# Patient Record
Sex: MALE | Race: WHITE | NOT HISPANIC OR LATINO | Employment: FULL TIME | URBAN - METROPOLITAN AREA
[De-identification: names, ages, dates, MRNs, and addresses within clinical notes are randomized per-mention and may not be internally consistent; named-entity substitution may affect disease eponyms.]

---

## 2017-01-05 ENCOUNTER — OFFICE VISIT (OUTPATIENT)
Dept: NEUROLOGY | Facility: CLINIC | Age: 57
End: 2017-01-05

## 2017-01-05 VITALS
OXYGEN SATURATION: 95 % | WEIGHT: 210 LBS | SYSTOLIC BLOOD PRESSURE: 130 MMHG | HEART RATE: 83 BPM | HEIGHT: 68 IN | DIASTOLIC BLOOD PRESSURE: 88 MMHG | BODY MASS INDEX: 31.83 KG/M2

## 2017-01-05 DIAGNOSIS — R41.3 MEMORY LOSS: Primary | ICD-10-CM

## 2017-01-05 DIAGNOSIS — I73.9 MICROANGIOPATHY (HCC): ICD-10-CM

## 2017-01-05 DIAGNOSIS — F41.9 ANXIETY: ICD-10-CM

## 2017-01-05 PROCEDURE — 99213 OFFICE O/P EST LOW 20 MIN: CPT | Performed by: PSYCHIATRY & NEUROLOGY

## 2017-01-05 RX ORDER — ATORVASTATIN CALCIUM 20 MG/1
20 TABLET, FILM COATED ORAL DAILY
Qty: 90 TABLET | Refills: 3 | Status: SHIPPED | OUTPATIENT
Start: 2017-01-05 | End: 2017-01-19 | Stop reason: SDUPTHER

## 2017-01-05 RX ORDER — BUPROPION HYDROCHLORIDE 150 MG/1
150 TABLET ORAL DAILY
Qty: 30 TABLET | Refills: 1 | Status: SHIPPED | OUTPATIENT
Start: 2017-01-05 | End: 2017-02-14 | Stop reason: DRUGHIGH

## 2017-01-05 RX ORDER — BUPROPION HYDROCHLORIDE 300 MG/1
300 TABLET ORAL EVERY MORNING
Qty: 90 TABLET | Refills: 3 | Status: SHIPPED | OUTPATIENT
Start: 2017-01-05 | End: 2017-01-19 | Stop reason: SDUPTHER

## 2017-01-05 NOTE — PROGRESS NOTES
Subjective:     Patient ID: Koffi Melo is a 56 y.o. male.    History of Present Illness  The following portions of the patient's history were reviewed and updated as appropriate: allergies, current medications, past family history, past medical history, past social history, past surgical history and problem list.  Patient has noticed that much of a change in memory and concentration with current Wellbutrin dose, denies further stroke symptoms. He underwent cranial MRI that showed moderate chronic microvascular changes.  Review of Systems   Constitutional: Negative for chills, fatigue, fever and unexpected weight change.   HENT: Negative for ear pain, hearing loss, nosebleeds, rhinorrhea and sore throat.    Eyes: Negative for photophobia, pain, discharge, itching and visual disturbance.   Respiratory: Negative for cough, chest tightness, shortness of breath and wheezing.    Cardiovascular: Negative for chest pain, palpitations and leg swelling.   Gastrointestinal: Negative for abdominal pain, blood in stool, constipation, diarrhea, nausea and vomiting.   Genitourinary: Negative for dysuria, frequency, hematuria and urgency.   Musculoskeletal: Negative for arthralgias, back pain, gait problem, joint swelling, myalgias, neck pain and neck stiffness.   Skin: Negative for rash and wound.   Allergic/Immunologic: Negative for environmental allergies and food allergies.   Neurological: Positive for headaches. Negative for dizziness, tremors, seizures, syncope, speech difficulty, weakness, light-headedness and numbness.   Hematological: Negative for adenopathy. Does not bruise/bleed easily.   Psychiatric/Behavioral: Negative for agitation, confusion, decreased concentration, hallucinations, sleep disturbance and suicidal ideas. The patient is not nervous/anxious.         Objective:    Neurologic Exam     Mental Status   Oriented to person, place, and time.       Physical Exam   Constitutional: He is oriented to  person, place, and time. He appears well-developed and well-nourished.   Cardiovascular: Normal rate and regular rhythm.    Pulmonary/Chest: Effort normal.   Neurological: He is alert and oriented to person, place, and time. He has normal reflexes.   Psychiatric: He has a normal mood and affect. His behavior is normal. Thought content normal.       Assessment/Plan:     Koffi was seen today for memory loss and sleeping problem.    Diagnoses and all orders for this visit:    Memory loss  -     buPROPion XL (WELLBUTRIN XL) 150 MG 24 hr tablet; Take 1 tablet by mouth Daily.  -     buPROPion XL (WELLBUTRIN XL) 300 MG 24 hr tablet; Take 1 tablet by mouth Every Morning.    Anxiety  -     buPROPion XL (WELLBUTRIN XL) 150 MG 24 hr tablet; Take 1 tablet by mouth Daily.  -     buPROPion XL (WELLBUTRIN XL) 300 MG 24 hr tablet; Take 1 tablet by mouth Every Morning.    Microangiopathy  -     atorvastatin (LIPITOR) 20 MG tablet; Take 1 tablet by mouth Daily.  -     Comprehensive Metabolic Panel; Future

## 2017-01-05 NOTE — MR AVS SNAPSHOT
"                        Koffi Melo   1/5/2017 4:00 PM   Office Visit    Dept Phone:  [phone]   Encounter #:  [id number]    Provider:  Abdon Wallace MD   Department:  [facility] NEUROLOGY                Your Full Care Plan              Your Updated Medication List          This list is accurate as of: 1/5/17  4:45 PM.  Always use your most recent med list.                aspirin 81 MG EC tablet       buPROPion  MG 24 hr tablet   Commonly known as:  WELLBUTRIN XL       cyclobenzaprine 10 MG tablet   Commonly known as:  FLEXERIL   Take 1 tablet by mouth 3 (Three) Times a Day As Needed for muscle spasms.       levoFLOXacin 500 MG tablet   Commonly known as:  LEVAQUIN   Take 1 tablet by mouth Daily.               Instructions     None    Patient Instructions History      Upcoming Appointments     Visit Type Date Time Department    FOLLOW UP 1/5/2017  4:00 PM [facility]    FOLLOW UP 7/6/2017  4:00 PM [facility]      MyChart Signup     Our records indicate that you have declined [facility] Vayus[facility]art signup. If you would like to sign up for Vayus[facility]art, please email [facility] at [email or web address] or call [phone] to obtain an activation code.             Other Info from Your Visit           Your Appointments     Jul 06, 2017  4:00 PM EDT   Follow Up with Abdon Wallace MD   [facility] NEUROLOGY (--)    [address] [place] [id number] [phone]           Arrive 15 minutes prior to appointment.              Allergies     Lortab [Hydrocodone-acetaminophen] Allergy Itching      Reason for Visit     Memory Loss     Sleeping Problem           Vital Signs     Blood Pressure Pulse Height Weight Oxygen Saturation Body Mass Index    130/88 83 68\" (172.7 cm) 210 lb (95.3 kg) 95% 31.93 kg/m2    Smoking Status                   Never Smoker             "

## 2017-01-19 DIAGNOSIS — I73.9 MICROANGIOPATHY (HCC): ICD-10-CM

## 2017-01-19 DIAGNOSIS — R41.3 MEMORY LOSS: ICD-10-CM

## 2017-01-19 DIAGNOSIS — F41.9 ANXIETY: ICD-10-CM

## 2017-01-19 RX ORDER — ATORVASTATIN CALCIUM 20 MG/1
20 TABLET, FILM COATED ORAL DAILY
Qty: 90 TABLET | Refills: 3 | Status: SHIPPED | OUTPATIENT
Start: 2017-01-19 | End: 2017-12-22 | Stop reason: SDUPTHER

## 2017-01-19 RX ORDER — BUPROPION HYDROCHLORIDE 300 MG/1
300 TABLET ORAL EVERY MORNING
Qty: 90 TABLET | Refills: 3 | Status: SHIPPED | OUTPATIENT
Start: 2017-01-19 | End: 2018-02-26 | Stop reason: SDUPTHER

## 2017-02-14 ENCOUNTER — OFFICE VISIT (OUTPATIENT)
Dept: INTERNAL MEDICINE | Facility: CLINIC | Age: 57
End: 2017-02-14

## 2017-02-14 VITALS
DIASTOLIC BLOOD PRESSURE: 78 MMHG | RESPIRATION RATE: 16 BRPM | WEIGHT: 218 LBS | TEMPERATURE: 97.6 F | SYSTOLIC BLOOD PRESSURE: 126 MMHG | BODY MASS INDEX: 33.15 KG/M2 | HEART RATE: 72 BPM

## 2017-02-14 DIAGNOSIS — M54.2 NECK PAIN: Primary | ICD-10-CM

## 2017-02-14 DIAGNOSIS — M54.9 UPPER BACK PAIN: ICD-10-CM

## 2017-02-14 PROCEDURE — 99213 OFFICE O/P EST LOW 20 MIN: CPT | Performed by: PHYSICIAN ASSISTANT

## 2017-02-14 RX ORDER — MELOXICAM 15 MG/1
15 TABLET ORAL DAILY
Qty: 30 TABLET | Refills: 2 | Status: SHIPPED | OUTPATIENT
Start: 2017-02-14 | End: 2017-02-15 | Stop reason: SDUPTHER

## 2017-02-14 RX ORDER — CYCLOBENZAPRINE HCL 10 MG
10 TABLET ORAL 3 TIMES DAILY PRN
Qty: 30 TABLET | Refills: 2 | Status: SHIPPED | OUTPATIENT
Start: 2017-02-14 | End: 2017-02-15 | Stop reason: SDUPTHER
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
## 2017-02-15 ENCOUNTER — TELEPHONE (OUTPATIENT)
Dept: INTERNAL MEDICINE | Facility: CLINIC | Age: 57
End: 2017-02-15

## 2017-02-15 ENCOUNTER — HOSPITAL ENCOUNTER (OUTPATIENT)
Dept: GENERAL RADIOLOGY | Facility: HOSPITAL | Age: 57
Discharge: HOME | End: 2017-02-15
Admitting: PHYSICIAN ASSISTANT

## 2017-02-15 DIAGNOSIS — M54.9 UPPER BACK PAIN: ICD-10-CM

## 2017-02-15 DIAGNOSIS — M54.2 NECK PAIN: ICD-10-CM

## 2017-02-15 PROCEDURE — 72040 X-RAY EXAM NECK SPINE 2-3 VW: CPT

## 2017-02-15 PROCEDURE — 72070 X-RAY EXAM THORAC SPINE 2VWS: CPT

## 2017-02-15 RX ORDER — CYCLOBENZAPRINE HCL 10 MG
10 TABLET ORAL 3 TIMES DAILY PRN
Qty: 30 TABLET | Refills: 0 | Status: SHIPPED | OUTPATIENT
Start: 2017-02-15 | End: 2018-06-05

## 2017-02-15 RX ORDER — MELOXICAM 15 MG/1
15 TABLET ORAL DAILY
Qty: 10 TABLET | Refills: 0 | Status: SHIPPED | OUTPATIENT
Start: 2017-02-15 | End: 2017-03-09 | Stop reason: SDUPTHER

## 2017-02-15 NOTE — TELEPHONE ENCOUNTER
10 DAY RXS SENT TO [facility] AT [facility] VIA [employer].     PT NOTIFIED.  DARREN MALDONADO APPREC.

## 2017-02-24 ENCOUNTER — OFFICE VISIT (OUTPATIENT)
Dept: INTERNAL MEDICINE | Facility: CLINIC | Age: 57
End: 2017-02-24

## 2017-02-24 VITALS
TEMPERATURE: 97.5 F | OXYGEN SATURATION: 97 % | BODY MASS INDEX: 32.55 KG/M2 | HEART RATE: 72 BPM | HEIGHT: 68 IN | DIASTOLIC BLOOD PRESSURE: 78 MMHG | SYSTOLIC BLOOD PRESSURE: 126 MMHG | RESPIRATION RATE: 18 BRPM | WEIGHT: 214.8 LBS

## 2017-02-24 DIAGNOSIS — F41.9 ANXIETY: Primary | ICD-10-CM

## 2017-02-24 PROCEDURE — 99213 OFFICE O/P EST LOW 20 MIN: CPT | Performed by: PHYSICIAN ASSISTANT

## 2017-02-24 RX ORDER — FLUOXETINE 20 MG/1
20 TABLET, FILM COATED ORAL DAILY
Qty: 30 TABLET | Refills: 2 | Status: SHIPPED | OUTPATIENT
Start: 2017-02-24 | End: 2017-02-24 | Stop reason: SDUPTHER

## 2017-02-24 RX ORDER — FLUOXETINE 20 MG/1
20 TABLET, FILM COATED ORAL DAILY
Qty: 30 TABLET | Refills: 2 | Status: SHIPPED | OUTPATIENT
Start: 2017-02-24 | End: 2017-06-04 | Stop reason: SDUPTHER

## 2017-02-24 NOTE — PROGRESS NOTES
"Subjective   Koffi Melo is a 56 y.o. male.   Chief Complaint   Patient presents with   • Memory Loss     States he is trouble with his memory. States he saw  and states he had an MRI that showed some collapsing arteries. Patient states he get's \"tore up\" easily or feels overwhelmed. States he is currently taking wellbutrin but doesn't feel that its working.      History of Present Illness     Pt here for follow up memory loss and questionable anxiety.  He was previously on klonopin and celexa a couple of years ago but discontinued them as he felt that he no longer needed them.  He has been working at [employer] for 25 years and finds his job to be very stressful.  He has anxiety about going to work and is always afraid that he is going to do something wrong and disappoint his boss.  Has difficulty finding words at times and his boss says that he finds that Yovani has trouble relaying things to him.  He says he feels that things have to be perfect and done in a certain way.  His son has OCD.    Has seen Dr. Hernandez (neuro) for these symptoms in the past.  Had CVA in 2015 - admitted to [facility], found to have PFO and treated with a patch.  MRI was ordered by Dr. Hernandez in Nov 2016.  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  He has also had normal MRA brain.      The following portions of the patient's history were reviewed and updated as appropriate: allergies, current medications, past family history, past medical history, past social history, past surgical history and problem list.    Review of Systems   Constitutional: Negative.    HENT: Negative.    Respiratory: Negative.    Cardiovascular: Negative.    Gastrointestinal: Negative.    Neurological: "        Forgetfulness   Psychiatric/Behavioral:        Anxiety       Objective   Physical Exam   Constitutional: He appears well-developed and well-nourished.   Cardiovascular: Normal rate, regular rhythm and normal heart sounds.    Pulmonary/Chest: Effort normal and breath sounds normal.   Psychiatric: He has a normal mood and affect. His behavior is normal. Judgment and thought content normal.       Assessment/Plan   Koffi was seen today for memory loss.    Diagnoses and all orders for this visit:    Anxiety  -     FLUoxetine (PROzac) 20 MG tablet; Take 1 tablet by mouth Daily for 90 days.      I feel that a lot of his symptoms stem from job related stressors.   He is currently looking for a new place of employment which I think will be positive.   Will try prozac and follow up in 1 month.

## 2017-03-09 ENCOUNTER — OFFICE VISIT (OUTPATIENT)
Dept: INTERNAL MEDICINE | Facility: CLINIC | Age: 57
End: 2017-03-09

## 2017-03-09 VITALS
SYSTOLIC BLOOD PRESSURE: 146 MMHG | TEMPERATURE: 97.4 F | HEART RATE: 90 BPM | DIASTOLIC BLOOD PRESSURE: 88 MMHG | RESPIRATION RATE: 20 BRPM

## 2017-03-09 DIAGNOSIS — M54.2 NECK PAIN: ICD-10-CM

## 2017-03-09 DIAGNOSIS — R10.9 LEFT FLANK PAIN: ICD-10-CM

## 2017-03-09 DIAGNOSIS — M54.9 UPPER BACK PAIN: ICD-10-CM

## 2017-03-09 DIAGNOSIS — M54.31 SCIATICA OF RIGHT SIDE: ICD-10-CM

## 2017-03-09 DIAGNOSIS — N20.0 KIDNEY STONES: Primary | ICD-10-CM

## 2017-03-09 LAB
ALBUMIN SERPL-MCNC: 4.6 G/DL (ref 3.2–4.8)
ALBUMIN/GLOB SERPL: 1.8 G/DL (ref 1.5–2.5)
ALP SERPL-CCNC: 74 U/L (ref 25–100)
ALT SERPL W P-5'-P-CCNC: 26 U/L (ref 7–40)
ANION GAP SERPL CALCULATED.3IONS-SCNC: 5 MMOL/L (ref 3–11)
AST SERPL-CCNC: 31 U/L (ref 0–33)
BASOPHILS # BLD AUTO: 0.04 10*3/MM3 (ref 0–0.2)
BASOPHILS NFR BLD AUTO: 0.5 % (ref 0–1)
BILIRUB BLD-MCNC: NEGATIVE MG/DL
BILIRUB SERPL-MCNC: 0.5 MG/DL (ref 0.3–1.2)
BUN BLD-MCNC: 16 MG/DL (ref 9–23)
BUN/CREAT SERPL: 13.3 (ref 7–25)
CALCIUM SPEC-SCNC: 9.9 MG/DL (ref 8.7–10.4)
CHLORIDE SERPL-SCNC: 105 MMOL/L (ref 99–109)
CLARITY, POC: CLEAR
CO2 SERPL-SCNC: 27 MMOL/L (ref 20–31)
COLOR UR: YELLOW
CREAT BLD-MCNC: 1.2 MG/DL (ref 0.6–1.3)
DEPRECATED RDW RBC AUTO: 41.4 FL (ref 37–54)
EOSINOPHIL # BLD AUTO: 0.11 10*3/MM3 (ref 0.1–0.3)
EOSINOPHIL NFR BLD AUTO: 1.3 % (ref 0–3)
ERYTHROCYTE [DISTWIDTH] IN BLOOD BY AUTOMATED COUNT: 13.1 % (ref 11.3–14.5)
EXPIRATION DATE: NORMAL
GFR SERPL CREATININE-BSD FRML MDRD: 63 ML/MIN/1.73
GLOBULIN UR ELPH-MCNC: 2.6 GM/DL
GLUCOSE BLD-MCNC: 86 MG/DL (ref 70–100)
GLUCOSE UR STRIP-MCNC: NEGATIVE MG/DL
HCT VFR BLD AUTO: 43.9 % (ref 38.9–50.9)
HGB BLD-MCNC: 15.2 G/DL (ref 13.1–17.5)
IMM GRANULOCYTES # BLD: 0.02 10*3/MM3 (ref 0–0.03)
IMM GRANULOCYTES NFR BLD: 0.2 % (ref 0–0.6)
KETONES UR QL: NEGATIVE
LEUKOCYTE EST, POC: NEGATIVE
LYMPHOCYTES # BLD AUTO: 2.11 10*3/MM3 (ref 0.6–4.8)
LYMPHOCYTES NFR BLD AUTO: 24.9 % (ref 24–44)
Lab: NORMAL
MCH RBC QN AUTO: 29.8 PG (ref 27–31)
MCHC RBC AUTO-ENTMCNC: 34.6 G/DL (ref 32–36)
MCV RBC AUTO: 86.1 FL (ref 80–99)
MONOCYTES # BLD AUTO: 0.7 10*3/MM3 (ref 0–1)
MONOCYTES NFR BLD AUTO: 8.3 % (ref 0–12)
NEUTROPHILS # BLD AUTO: 5.48 10*3/MM3 (ref 1.5–8.3)
NEUTROPHILS NFR BLD AUTO: 64.8 % (ref 41–71)
NITRITE UR-MCNC: NEGATIVE MG/ML
PH UR: 6 [PH] (ref 5–8)
PLATELET # BLD AUTO: 198 10*3/MM3 (ref 150–450)
PMV BLD AUTO: 10.1 FL (ref 6–12)
POTASSIUM BLD-SCNC: 3.9 MMOL/L (ref 3.5–5.5)
PROT SERPL-MCNC: 7.2 G/DL (ref 5.7–8.2)
PROT UR STRIP-MCNC: NEGATIVE MG/DL
RBC # BLD AUTO: 5.1 10*6/MM3 (ref 4.2–5.76)
RBC # UR STRIP: NEGATIVE /UL
SODIUM BLD-SCNC: 137 MMOL/L (ref 132–146)
SP GR UR: 1.02 (ref 1–1.03)
UROBILINOGEN UR QL: NORMAL
WBC NRBC COR # BLD: 8.46 10*3/MM3 (ref 3.5–10.8)

## 2017-03-09 PROCEDURE — 80053 COMPREHEN METABOLIC PANEL: CPT | Performed by: NURSE PRACTITIONER

## 2017-03-09 PROCEDURE — 85025 COMPLETE CBC W/AUTO DIFF WBC: CPT | Performed by: NURSE PRACTITIONER

## 2017-03-09 PROCEDURE — 81003 URINALYSIS AUTO W/O SCOPE: CPT | Performed by: NURSE PRACTITIONER

## 2017-03-09 PROCEDURE — 99214 OFFICE O/P EST MOD 30 MIN: CPT | Performed by: NURSE PRACTITIONER

## 2017-03-09 PROCEDURE — 96372 THER/PROPH/DIAG INJ SC/IM: CPT | Performed by: NURSE PRACTITIONER

## 2017-03-09 RX ORDER — MELOXICAM 15 MG/1
15 TABLET ORAL DAILY
Qty: 10 TABLET | Refills: 0 | Status: SHIPPED | OUTPATIENT
Start: 2017-03-09 | End: 2017-09-06

## 2017-03-09 RX ORDER — DEXAMETHASONE SODIUM PHOSPHATE 4 MG/ML
8 INJECTION, SOLUTION INTRA-ARTICULAR; INTRALESIONAL; INTRAMUSCULAR; INTRAVENOUS; SOFT TISSUE ONCE
Status: COMPLETED | OUTPATIENT
Start: 2017-03-09 | End: 2017-03-09

## 2017-03-09 RX ORDER — DEXAMETHASONE SODIUM PHOSPHATE 4 MG/ML
8 INJECTION, SOLUTION INTRA-ARTICULAR; INTRALESIONAL; INTRAMUSCULAR; INTRAVENOUS; SOFT TISSUE ONCE
Status: DISCONTINUED | OUTPATIENT
Start: 2017-03-09 | End: 2017-03-09

## 2017-03-09 RX ORDER — METHYLPREDNISOLONE 4 MG/1
TABLET ORAL
Qty: 21 TABLET | Refills: 0 | Status: SHIPPED | OUTPATIENT
Start: 2017-03-09 | End: 2017-03-28

## 2017-03-09 RX ORDER — KETOROLAC TROMETHAMINE 30 MG/ML
60 INJECTION, SOLUTION INTRAMUSCULAR; INTRAVENOUS ONCE AS NEEDED
Qty: 2 ML | Refills: 0 | Status: SHIPPED | OUTPATIENT
Start: 2017-03-09 | End: 2017-03-10

## 2017-03-09 RX ORDER — METHYLPREDNISOLONE 4 MG/1
TABLET ORAL
Qty: 21 TABLET | Refills: 0 | Status: SHIPPED | OUTPATIENT
Start: 2017-03-09 | End: 2017-03-09

## 2017-03-09 RX ORDER — DEXAMETHASONE SODIUM PHOSPHATE 4 MG/ML
4 INJECTION, SOLUTION INTRA-ARTICULAR; INTRALESIONAL; INTRAMUSCULAR; INTRAVENOUS; SOFT TISSUE ONCE
Status: CANCELLED | OUTPATIENT
Start: 2017-03-09 | End: 2017-03-09

## 2017-03-09 RX ADMIN — DEXAMETHASONE SODIUM PHOSPHATE 8 MG: 4 INJECTION, SOLUTION INTRA-ARTICULAR; INTRALESIONAL; INTRAMUSCULAR; INTRAVENOUS; SOFT TISSUE at 17:45

## 2017-03-09 NOTE — PROGRESS NOTES
Chief Complaint   Patient presents with   • Back Pain   • Hip Pain        Subjective     History of Present Illness   l flank pain started thought is was kidney stone pain x 3d felt like his previous sotnese.  Pain is a 7-8 on 1-10 scale no colick fever. If he leans then pain in L flank.    Still having pain in R trapezius and saw clarisa gilbert and had xray and was neg and was instructed to take flexeril and feels like intemittent sharp pain.   He has not taken any otc med for pain but does not think it is going to help. No numbness tingling weakness in BUE.  Only took the flexeril 1-2 times for this symptom.    Today her developed pain in R hip and sharp and feels like he is pulling something and radiates down hip and how he steps aggravates.  No recent trauma long travel sitting .  No changes in bowel bladder control no weakness numbness in BLE      The following portions of the patient's history were reviewed and updated as appropriate: allergies, current medications, past family history, past medical history, past social history, past surgical history and problem list.    Review of Systems   Musculoskeletal: Positive for back pain.   All other systems reviewed and are negative.      Objective   Physical Exam   Constitutional: He is oriented to person, place, and time. He appears well-developed and well-nourished.   Neck: Normal range of motion.   Cardiovascular: Normal rate and regular rhythm.    Pulmonary/Chest: Effort normal and breath sounds normal.   Abdominal: Soft. Bowel sounds are normal. He exhibits no distension and no mass. There is no tenderness. There is no rebound and no guarding. No hernia.   Musculoskeletal: He exhibits tenderness. He exhibits no edema or deformity.   Negative straight leg raise muscle strength 5 over 5 in all major muscle groups he is tender to palpate his left flank region is also tender to palpate in the right SI joint negative straight leg raise   Neurological: He is alert and  oriented to person, place, and time. He has normal reflexes.   Skin: Skin is warm and dry.   Psychiatric: He has a normal mood and affect. His behavior is normal.   Vitals reviewed.      Results for orders placed or performed in visit on 03/09/17   Comprehensive Metabolic Panel   Result Value Ref Range    Glucose 86 70 - 100 mg/dL    BUN 16 9 - 23 mg/dL    Creatinine 1.20 0.60 - 1.30 mg/dL    Sodium 137 132 - 146 mmol/L    Potassium 3.9 3.5 - 5.5 mmol/L    Chloride 105 99 - 109 mmol/L    CO2 27.0 20.0 - 31.0 mmol/L    Calcium 9.9 8.7 - 10.4 mg/dL    Total Protein 7.2 5.7 - 8.2 g/dL    Albumin 4.60 3.20 - 4.80 g/dL    ALT (SGPT) 26 7 - 40 U/L    AST (SGOT) 31 0 - 33 U/L    Alkaline Phosphatase 74 25 - 100 U/L    Total Bilirubin 0.5 0.3 - 1.2 mg/dL    eGFR Non African Amer 63 >60 mL/min/1.73    Globulin 2.6 gm/dL    A/G Ratio 1.8 1.5 - 2.5 g/dL    BUN/Creatinine Ratio 13.3 7.0 - 25.0    Anion Gap 5.0 3.0 - 11.0 mmol/L   CBC Auto Differential   Result Value Ref Range    WBC 8.46 3.50 - 10.80 10*3/mm3    RBC 5.10 4.20 - 5.76 10*6/mm3    Hemoglobin 15.2 13.1 - 17.5 g/dL    Hematocrit 43.9 38.9 - 50.9 %    MCV 86.1 80.0 - 99.0 fL    MCH 29.8 27.0 - 31.0 pg    MCHC 34.6 32.0 - 36.0 g/dL    RDW 13.1 11.3 - 14.5 %    RDW-SD 41.4 37.0 - 54.0 fl    MPV 10.1 6.0 - 12.0 fL    Platelets 198 150 - 450 10*3/mm3    Neutrophil % 64.8 41.0 - 71.0 %    Lymphocyte % 24.9 24.0 - 44.0 %    Monocyte % 8.3 0.0 - 12.0 %    Eosinophil % 1.3 0.0 - 3.0 %    Basophil % 0.5 0.0 - 1.0 %    Immature Grans % 0.2 0.0 - 0.6 %    Neutrophils, Absolute 5.48 1.50 - 8.30 10*3/mm3    Lymphocytes, Absolute 2.11 0.60 - 4.80 10*3/mm3    Monocytes, Absolute 0.70 0.00 - 1.00 10*3/mm3    Eosinophils, Absolute 0.11 0.10 - 0.30 10*3/mm3    Basophils, Absolute 0.04 0.00 - 0.20 10*3/mm3    Immature Grans, Absolute 0.02 0.00 - 0.03 10*3/mm3   POCT urinalysis dipstick, automated   Result Value Ref Range    Color Yellow Yellow, Straw, Dark Yellow, Diana    Clarity, UA  Clear Clear    Glucose, UA Negative Negative, 1000 mg/dL (3+) mg/dL    Bilirubin Negative Negative    Ketones, UA Negative Negative    Specific Gravity  1.020 1.005 - 1.030    Blood, UA Negative Negative    pH, Urine 6.0 5.0 - 8.0    Protein, POC Negative Negative mg/dL    Urobilinogen, UA Normal Normal    Leukocytes Negative Negative    Nitrite, UA Negative Negative    Lot Number [id number]     Expiration Date 02/28/2018         Assessment/Plan   Koffi was seen today for back pain and hip pain.    Diagnoses and all orders for this visit:    Kidney stones  -     POCT urinalysis dipstick, automated  -     CT Abdomen Pelvis Stone Protocol; Future  -     ketorolac (TORADOL) injection 60 mg; Inject 2 mL into the shoulder, thigh, or buttocks 1 (One) Time.    Left flank pain  -     CT Abdomen Pelvis Stone Protocol; Future  -     CBC & Differential  -     Comprehensive Metabolic Panel  -     CBC Auto Differential  -     ketorolac (TORADOL) injection 60 mg; Inject 2 mL into the shoulder, thigh, or buttocks 1 (One) Time.    Sciatica of right side  -     Ambulatory Referral to Physical Therapy Evaluate and treat  -     Discontinue: dexamethasone (DECADRON) injection 8 mg; Infuse 2 mL into a venous catheter 1 (One) Time.  -     MethylPREDNISolone (MEDROL, DIAMOND,) 4 MG tablet; Take as directed on package instructions.  -     dexamethasone (DECADRON) injection 8 mg; Inject 2 mL into the shoulder, thigh, or buttocks 1 (One) Time.    Neck pain  -     meloxicam (MOBIC) 15 MG tablet; Take 1 tablet by mouth Daily.    Upper back pain  -     meloxicam (MOBIC) 15 MG tablet; Take 1 tablet by mouth Daily.  -     Ambulatory Referral to Physical Therapy Evaluate and treat    Other orders  -     Discontinue: ketorolac (TORADOL) 30 MG/ML injection; Inject 60 mg into the shoulder, thigh, or buttocks 1 (One) Time As Needed for moderate pain (4-6) for up to 1 dose.  -     Discontinue: MethylPREDNISolone (MEDROL, DIAMOND,) 4 MG tablet; Take as  directed on package instructions.  -     Cancel: dexamethasone (DECADRON) injection 4 mg; Infuse 1 mL into a venous catheter 1 (One) Time.    Follow up f/u by phone  RTC/call  If symptoms worsen  Meds MOA and SE's reviewed and pt v/u

## 2017-03-10 ENCOUNTER — TELEPHONE (OUTPATIENT)
Dept: INTERNAL MEDICINE | Facility: CLINIC | Age: 57
End: 2017-03-10

## 2017-03-10 ENCOUNTER — HOSPITAL ENCOUNTER (OUTPATIENT)
Dept: CT IMAGING | Facility: HOSPITAL | Age: 57
Discharge: HOME | End: 2017-03-10
Admitting: NURSE PRACTITIONER

## 2017-03-10 DIAGNOSIS — R10.9 LEFT FLANK PAIN: Primary | ICD-10-CM

## 2017-03-10 DIAGNOSIS — N20.0 KIDNEY STONES: ICD-10-CM

## 2017-03-10 DIAGNOSIS — R10.9 LEFT FLANK PAIN: ICD-10-CM

## 2017-03-10 PROCEDURE — 74176 CT ABD & PELVIS W/O CONTRAST: CPT

## 2017-03-10 NOTE — TELEPHONE ENCOUNTER
Let patient know the CT indicates we need to send him to urology please give him appointment time and see how he is feeling

## 2017-03-10 NOTE — TELEPHONE ENCOUNTER
Please call patient to see if he is having any discomfort and please let him know the schedule for his urology appointment Monday.

## 2017-03-10 NOTE — TELEPHONE ENCOUNTER
Pt. informed. Verbal understanding and great appreciation received. Pt is very concerned and would like to know if CT results are back and what it shows?

## 2017-03-10 NOTE — TELEPHONE ENCOUNTER
----- Message from Diana Grissom sent at 3/10/2017 12:45 PM EST -----  Please call pt with results as soon as you get them from CT. Thanks     [phone]

## 2017-03-14 ENCOUNTER — TRANSCRIBE ORDERS (OUTPATIENT)
Dept: ADMINISTRATIVE | Facility: HOSPITAL | Age: 57
End: 2017-03-14

## 2017-03-14 DIAGNOSIS — N13.30 HYDRONEPHROSIS, UNSPECIFIED HYDRONEPHROSIS TYPE: Primary | ICD-10-CM

## 2017-03-15 NOTE — PATIENT INSTRUCTIONS
Radicular Pain  Radicular pain in either the arm or leg is usually from a bulging or herniated disk in the spine. A piece of the herniated disk may press against the nerves as the nerves exit the spine. This causes pain which is felt at the tips of the nerves down the arm or leg. Other causes of radicular pain may include:  · Fractures.  · Heart disease.  · Cancer.  · An abnormal and usually degenerative state of the nervous system or nerves (neuropathy).  Diagnosis may require CT or MRI scanning to determine the primary cause.   Nerves that start at the neck (nerve roots) may cause radicular pain in the outer shoulder and arm. It can spread down to the thumb and fingers. The symptoms vary depending on which nerve root has been affected. In most cases radicular pain improves with conservative treatment. Neck problems may require physical therapy, a neck collar, or cervical traction. Treatment may take many weeks, and surgery may be considered if the symptoms do not improve.   Conservative treatment is also recommended for sciatica. Sciatica causes pain to radiate from the lower back or buttock area down the leg into the foot. Often there is a history of back problems. Most patients with sciatica are better after 2 to 4 weeks of rest and other supportive care. Short term bed rest can reduce the disk pressure considerably. Sitting, however, is not a good position since this increases the pressure on the disk. You should avoid bending, lifting, and all other activities which make the problem worse. Traction can be used in severe cases. Surgery is usually reserved for patients who do not improve within the first months of treatment.  Only take over-the-counter or prescription medicines for pain, discomfort, or fever as directed by your caregiver. Narcotics and muscle relaxants may help by relieving more severe pain and spasm and by providing mild sedation. Cold or massage can give significant relief. Spinal manipulation  is not recommended. It can increase the degree of disc protrusion. Epidural steroid injections are often effective treatment for radicular pain. These injections deliver medicine to the spinal nerve in the space between the protective covering of the spinal cord and back bones (vertebrae). Your caregiver can give you more information about steroid injections. These injections are most effective when given within two weeks of the onset of pain.   You should see your caregiver for follow up care as recommended. A program for neck and back injury rehabilitation with stretching and strengthening exercises is an important part of management.   SEEK IMMEDIATE MEDICAL CARE IF:  · You develop increased pain, weakness, or numbness in your arm or leg.  · You develop difficulty with bladder or bowel control.  · You develop abdominal pain.     This information is not intended to replace advice given to you by your health care provider. Make sure you discuss any questions you have with your health care provider.     Document Released: 01/25/2006 Document Revised: 01/08/2016 Document Reviewed: 07/13/2016  [employer] Interactive Patient Education ©2016 [employer]

## 2017-03-17 ENCOUNTER — HOSPITAL ENCOUNTER (OUTPATIENT)
Dept: NUCLEAR MEDICINE | Facility: HOSPITAL | Age: 57
Discharge: HOME | End: 2017-03-17
Attending: UROLOGY

## 2017-03-17 DIAGNOSIS — N13.30 HYDRONEPHROSIS, UNSPECIFIED HYDRONEPHROSIS TYPE: ICD-10-CM

## 2017-03-17 PROCEDURE — A9562 TC99M MERTIATIDE: HCPCS | Performed by: UROLOGY

## 2017-03-17 PROCEDURE — 78707 K FLOW/FUNCT IMAGE W/O DRUG: CPT

## 2017-03-17 PROCEDURE — 82565 ASSAY OF CREATININE: CPT

## 2017-03-17 PROCEDURE — 0 TECHNETIUM MERTIATIDE: Performed by: UROLOGY

## 2017-03-17 PROCEDURE — 78708 K FLOW/FUNCT IMAGE W/DRUG: CPT

## 2017-03-17 RX ORDER — FUROSEMIDE 10 MG/ML
20 INJECTION INTRAMUSCULAR; INTRAVENOUS ONCE
Status: DISCONTINUED | OUTPATIENT
Start: 2017-03-17 | End: 2017-03-18 | Stop reason: HOSPADM

## 2017-03-17 RX ADMIN — Medication 1 DOSE: at 14:50

## 2017-03-19 LAB — CREAT BLDA-MCNC: 1.2 MG/DL (ref 0.6–1.3)

## 2017-03-20 ENCOUNTER — TELEPHONE (OUTPATIENT)
Dept: INTERNAL MEDICINE | Facility: CLINIC | Age: 57
End: 2017-03-20

## 2017-03-20 NOTE — TELEPHONE ENCOUNTER
----- Message from Diana Grissom sent at 3/20/2017  3:53 PM EDT -----  PT HAD A NM Renal With Flow & Function Without Pharmacological Intervention PERFORMED ON Friday 3/17/17 PER DR. MORALES, PT WOULD LIKE TO SEE IF WE CAN GO OVER THE RESULTS. SINCE WE ARE IN THE SAME SYSTEM. INFORMED HIM I DON'T KNOW IF THIS IS AN OPTION.     [phone], PATIENT

## 2017-03-20 NOTE — TELEPHONE ENCOUNTER
L flank pain likely related to hydronephrosis and the test urology ordered is not read and both need to be addressed by urology please note this in both of his recent notes form he and wife.Thank you.

## 2017-03-20 NOTE — TELEPHONE ENCOUNTER
----- Message from Jen Duong sent at 3/20/2017  1:59 PM EDT -----  WIFE-WEN ANNA-[phone]    PT IS STILL IN PAIN-WHAT IS HIS NEXT STEP?

## 2017-03-21 ENCOUNTER — TELEPHONE (OUTPATIENT)
Dept: INTERNAL MEDICINE | Facility: CLINIC | Age: 57
End: 2017-03-21

## 2017-03-21 NOTE — TELEPHONE ENCOUNTER
----- Message from Sheela Cardenas sent at 3/20/2017  5:39 PM EDT -----  Contact: MALLORY ANNA CALLING TO SEE IF YOU HAVE SEEN THE RESULTS FROM HIS UROLOGY APPT. HE CAN BE REACHED -[phone]

## 2017-03-21 NOTE — TELEPHONE ENCOUNTER
RADAMESOV for return call. Office # given. Just need to let him know that he will need to contact urology to get results of any testing he had through them.

## 2017-03-28 ENCOUNTER — OFFICE VISIT (OUTPATIENT)
Dept: INTERNAL MEDICINE | Facility: CLINIC | Age: 57
End: 2017-03-28

## 2017-03-28 VITALS
TEMPERATURE: 97.9 F | BODY MASS INDEX: 32.84 KG/M2 | DIASTOLIC BLOOD PRESSURE: 84 MMHG | WEIGHT: 216 LBS | SYSTOLIC BLOOD PRESSURE: 136 MMHG | RESPIRATION RATE: 18 BRPM | HEART RATE: 76 BPM

## 2017-03-28 DIAGNOSIS — R10.9 LEFT FLANK PAIN: Primary | ICD-10-CM

## 2017-03-28 PROCEDURE — 99214 OFFICE O/P EST MOD 30 MIN: CPT | Performed by: INTERNAL MEDICINE

## 2017-03-28 RX ORDER — IBUPROFEN 800 MG/1
800 TABLET ORAL EVERY 8 HOURS PRN
Qty: 21 TABLET | Refills: 0 | Status: SHIPPED | OUTPATIENT
Start: 2017-03-28 | End: 2017-09-06

## 2017-03-28 NOTE — PROGRESS NOTES
Chief Complaint   Patient presents with   • LEFT FLANK PAIN       History of Present Illness    The patient is being seen for a follow-up of his left flank pain. The pain has improved. The pain is characterized as knife like and sharp and is constant. He is currently not taking a medication.   The pain has been present for three weeks. It does not radiate. The patient has not had a fever. The patient reports that motion aggravates the pain. The patient has noted no alleviating factors. The patient denies hematuria, dysuria, nausea, vomiting, diarrhea, constipation, a rash, rectal bleeding, urinary hesitancy or urinary frequency. He has seen urology. Those notes are reviewed and reveal a normal CT except for a question of left hydronephrosis but a normal renal flow scan felt to be consistent with cysts. There was no evidence of stones.    Medications      Current Outpatient Prescriptions:   •  aspirin 81 MG EC tablet, Take 81 mg by mouth daily., Disp: , Rfl:   •  atorvastatin (LIPITOR) 20 MG tablet, Take 1 tablet by mouth Daily., Disp: 90 tablet, Rfl: 3  •  buPROPion XL (WELLBUTRIN XL) 300 MG 24 hr tablet, Take 1 tablet by mouth Every Morning., Disp: 90 tablet, Rfl: 3  •  cyclobenzaprine (FLEXERIL) 10 MG tablet, Take 1 tablet by mouth 3 (Three) Times a Day As Needed for muscle spasms., Disp: 30 tablet, Rfl: 0  •  FLUoxetine (PROzac) 20 MG tablet, Take 1 tablet by mouth Daily for 90 days., Disp: 30 tablet, Rfl: 2  •  meloxicam (MOBIC) 15 MG tablet, Take 1 tablet by mouth Daily., Disp: 10 tablet, Rfl: 0     Allergies    Allergies   Allergen Reactions   • Lortab [Hydrocodone-Acetaminophen] Itching       Problem List    Patient Active Problem List   Diagnosis   • Anxiety   • PFO (patent foramen ovale)   • History of CVA (cerebrovascular accident)   • Obesity       Physical Examination    /84 (BP Location: Right arm, Patient Position: Sitting, Cuff Size: Adult)  Pulse 76  Temp 97.9 °F (36.6 °C) (Temporal Artery )    Resp 18  Wt 216 lb (98 kg)  BMI 32.84 kg/m2  HEENT: Head- Normocephalic Atraumatic. Facies- Within normal limits. Pinnas- Normal texture and shape bilaterally. Canals- Normal bilaterally. TMs- Normal bilaterally. Nares- Patent bilaterally. Nasal Septum- is normal. There is no tenderness to palpation over the frontal or maxillary sinuses. Lids- Normal bilaterally. Conjunctiva- Clear bilaterally. Sclera- Anicteric bilaterally. Oropharynx- Moist with no lesions. Tonsils- No enlargement, erythema or exudate.    Lungs: Auscultation- Clear to auscultation bilaterally. There are no retractions, clubbing or cyanosis. The Expiratory to Inspiratory ratio is equal.    Cardiovascular: There are no carotid bruits. Heart- Normal Rate with Regular rhythm and no murmurs. There are no gallops. There are no rubs. In the lower extremities there is no edema. The upper extremities do not have edema.    Abdomen: Noted to have tender over the left paraspinal and abdominal muscles when these muscles are isolated with leg raise but is not noted to have rigidity, a mass, a hernia, an enlarged liver, an enlarged spleen, an enlarged right kidney, an enlarged left kidney, pulsatile mass or scarring.    Impression and Assessment    Left Flank Pain.    Plan    Left Flank Pain Plan: Medication will be added as noted below.    Koffi was seen today for left flank pain.    Diagnoses and all orders for this visit:    Left flank pain    Other orders  -     ibuprofen (ADVIL,MOTRIN) 800 MG tablet; Take 1 tablet by mouth Every 8 (Eight) Hours As Needed for Moderate Pain (4-6).        Return to Office    The patient was instructed to return for follow-up in 1 month.    The patient was instructed to return sooner if the condition changes, worsens, or doesn't resolve.

## 2017-04-09 ENCOUNTER — TELEPHONE (OUTPATIENT)
Dept: INTERNAL MEDICINE | Facility: CLINIC | Age: 57
End: 2017-04-09

## 2017-04-09 NOTE — TELEPHONE ENCOUNTER
----- Message from Diana Grissom sent at 4/7/2017  4:14 PM EDT -----  PT IS SCHEDULED FOR MON 4/10 AT 9:45AM BUT WOULD LIKE TO COME LATER THAT AFTERNOON IF POSSIBLE.     PATIENT: [phone]

## 2017-04-09 NOTE — TELEPHONE ENCOUNTER
I don't have any openings later that day but he could schedule for later in the week.   Daniel Salazar MD  3:23 PM  04/09/17

## 2017-04-10 ENCOUNTER — TELEPHONE (OUTPATIENT)
Dept: INTERNAL MEDICINE | Facility: CLINIC | Age: 57
End: 2017-04-10

## 2017-04-10 ENCOUNTER — OFFICE VISIT (OUTPATIENT)
Dept: INTERNAL MEDICINE | Facility: CLINIC | Age: 57
End: 2017-04-10

## 2017-04-10 ENCOUNTER — HOSPITAL ENCOUNTER (OUTPATIENT)
Dept: CT IMAGING | Facility: HOSPITAL | Age: 57
Discharge: HOME | End: 2017-04-10
Attending: INTERNAL MEDICINE | Admitting: INTERNAL MEDICINE

## 2017-04-10 VITALS
DIASTOLIC BLOOD PRESSURE: 80 MMHG | WEIGHT: 211 LBS | HEART RATE: 76 BPM | BODY MASS INDEX: 32.08 KG/M2 | RESPIRATION RATE: 16 BRPM | SYSTOLIC BLOOD PRESSURE: 122 MMHG | TEMPERATURE: 98.1 F

## 2017-04-10 DIAGNOSIS — R10.9 LEFT FLANK PAIN: ICD-10-CM

## 2017-04-10 DIAGNOSIS — R10.9 LEFT FLANK PAIN: Primary | ICD-10-CM

## 2017-04-10 PROCEDURE — 74177 CT ABD & PELVIS W/CONTRAST: CPT

## 2017-04-10 PROCEDURE — 99213 OFFICE O/P EST LOW 20 MIN: CPT | Performed by: INTERNAL MEDICINE

## 2017-04-10 PROCEDURE — 0 IOPAMIDOL 61 % SOLUTION: Performed by: INTERNAL MEDICINE

## 2017-04-10 RX ORDER — METRONIDAZOLE 500 MG/1
500 TABLET ORAL 2 TIMES DAILY
Qty: 20 TABLET | Refills: 0 | Status: SHIPPED | OUTPATIENT
Start: 2017-04-10 | End: 2017-04-20

## 2017-04-10 RX ORDER — CIPROFLOXACIN 500 MG/1
500 TABLET, FILM COATED ORAL 2 TIMES DAILY
Qty: 20 TABLET | Refills: 0 | Status: SHIPPED | OUTPATIENT
Start: 2017-04-10 | End: 2017-04-20

## 2017-04-10 RX ADMIN — IOPAMIDOL 99 ML: 612 INJECTION, SOLUTION INTRAVENOUS at 11:35

## 2017-04-10 NOTE — TELEPHONE ENCOUNTER
----- Message from Tony Cheung sent at 4/10/2017 11:06 AM EDT -----  PT WAS SEEN IN OFFICE TODAY AND SENT TO THE [facility] LOCATION FOR MRI, PT WENT AND THE ORDER ISN'T THERE. IF YOU COULD TAKE CARE OF THAT, THANK YOU!    [phone]

## 2017-04-10 NOTE — PROGRESS NOTES
Chief Complaint   Patient presents with   • LEFT HIP PAIN       History of Present Illness      The patient is being seen for a follow-up of his left lower quadrant, pelvic and left flank pain. The pain has worsened. The pain is characterized as aching, burning and sharp and is constant. He is currently not taking a medication.   The pain has been present for one month. It does not radiate. The patient has not had a fever. The patient reports that motion aggravates the pain. He states that he just can't get comfortable. The patient has noted no alleviating factors. The patient denies hematuria, dysuria, nausea, vomiting, diarrhea, constipation, a rash, rectal bleeding, urinary hesitancy or urinary frequency. His CT revealed a possible obstruction vs cysts on the left kidney.    Review of Systems    GENERAL- Denies Unexplained Weight Loss, Chills, Sweats or Fatigue.    PULMONARY- Denies Wheezing, Dyspnea, Sputum Production, Cough or Hemoptysis.    GASTROINTESTINAL- Reports: Abdominal Pain..    Medications      Current Outpatient Prescriptions:   •  aspirin 81 MG EC tablet, Take 81 mg by mouth daily., Disp: , Rfl:   •  atorvastatin (LIPITOR) 20 MG tablet, Take 1 tablet by mouth Daily., Disp: 90 tablet, Rfl: 3  •  buPROPion XL (WELLBUTRIN XL) 300 MG 24 hr tablet, Take 1 tablet by mouth Every Morning., Disp: 90 tablet, Rfl: 3  •  cyclobenzaprine (FLEXERIL) 10 MG tablet, Take 1 tablet by mouth 3 (Three) Times a Day As Needed for muscle spasms., Disp: 30 tablet, Rfl: 0  •  FLUoxetine (PROzac) 20 MG tablet, Take 1 tablet by mouth Daily for 90 days., Disp: 30 tablet, Rfl: 2  •  ibuprofen (ADVIL,MOTRIN) 800 MG tablet, Take 1 tablet by mouth Every 8 (Eight) Hours As Needed for Moderate Pain (4-6)., Disp: 21 tablet, Rfl: 0  •  meloxicam (MOBIC) 15 MG tablet, Take 1 tablet by mouth Daily., Disp: 10 tablet, Rfl: 0     Allergies    Allergies   Allergen Reactions   • Lortab [Hydrocodone-Acetaminophen] Itching       Problem  List    Patient Active Problem List   Diagnosis   • Anxiety   • PFO (patent foramen ovale)   • History of CVA (cerebrovascular accident)   • Obesity       Medications, Allergies, Problems List and Past History were reviewed and updated.    Physical Examination    /80 (BP Location: Right arm, Patient Position: Sitting, Cuff Size: Adult)  Pulse 76  Temp 98.1 °F (36.7 °C) (Temporal Artery )   Resp 16  Wt 211 lb (95.7 kg)  BMI 32.08 kg/m2    HEENT: Head- Normocephalic Atraumatic. Facies- Within normal limits. Pinnas- Normal texture and shape bilaterally. Canals- Normal bilaterally. TMs- Normal bilaterally. Nares- Patent bilaterally. Nasal Septum- is normal. There is no tenderness to palpation over the frontal or maxillary sinuses. Lids- Normal bilaterally. Conjunctiva- Clear bilaterally. Sclera- Anicteric bilaterally. Oropharynx- Moist with no lesions. Tonsils- No enlargement, erythema or exudate.    Lungs: Auscultation- Clear to auscultation bilaterally. There are no retractions, clubbing or cyanosis. The Expiratory to Inspiratory ratio is equal.    Cardiovascular: There are no carotid bruits. Heart- Normal Rate with Regular rhythm and no murmurs. There are no gallops. There are no rubs. In the lower extremities there is no edema. The upper extremities do not have edema.    Abdomen: Noted to have tenderness but is not noted to have rigidity, a mass, a hernia, an enlarged liver, an enlarged spleen, an enlarged right kidney, an enlarged left kidney, pulsatile mass or scarring. The tenderness is located in the left upper quadrant, left pelvis and left flank. The tenderness is associated with guarding but not rebound. There is CVA tenderness noted on the left side but not on the right.    Impression and Assessment    Left Lower Quadrant Abdominal Pain, Pelvic Pain and left flank pain.    Plan    Left Lower Quadrant Abdominal Pain, Pelvic Pain and left flank pain Plan: Further plans will be made after the tests  are reviewed.      Return to Office    The patient will follow-up after CT.    The patient was instructed to return sooner if the condition changes, worsens, or doesn't resolve.

## 2017-04-13 ENCOUNTER — OFFICE VISIT (OUTPATIENT)
Dept: INTERNAL MEDICINE | Facility: CLINIC | Age: 57
End: 2017-04-13

## 2017-04-13 VITALS
BODY MASS INDEX: 32.23 KG/M2 | SYSTOLIC BLOOD PRESSURE: 138 MMHG | TEMPERATURE: 97.1 F | RESPIRATION RATE: 16 BRPM | WEIGHT: 212 LBS | DIASTOLIC BLOOD PRESSURE: 72 MMHG | HEART RATE: 72 BPM

## 2017-04-13 DIAGNOSIS — K57.32 DIVERTICULITIS OF LARGE INTESTINE WITHOUT PERFORATION OR ABSCESS WITHOUT BLEEDING: Primary | ICD-10-CM

## 2017-04-13 PROCEDURE — 99213 OFFICE O/P EST LOW 20 MIN: CPT | Performed by: INTERNAL MEDICINE

## 2017-04-13 NOTE — PROGRESS NOTES
Chief Complaint   Patient presents with   • Flank Pain     Left       History of Present Illness      The patient present for a follow-up of his diverticulitis. The pain is improved and is located in the left lower quadrant. He is currently taking   - CIPRO (EQ 500MG BASE) 1 BID   - FLAGYL (500MG) 1 BID   The medication has given symptom relief.       The pain has been present for two weeks. It does not radiate. The patient has not had a fever. The patient reports that the pain is aggravated by motion but it is not aggravated by foods, medications, hunger or alcoholic beverages. The patient has noted no alleviating factors. The patient also denies hematuria, dysuria, nausea, vomiting, diarrhea, constipation, a rash, rectal bleeding, urinary retention or urinary frequency.    Review of Systems    GENERAL- Denies Unexplained Weight Loss, Chills, Sweats or Fatigue.    CARDIOVASCULAR- Denies Chest Pain, Claudication, Edema, Syncope or Palpitations.    PULMONARY- Denies Wheezing, Dyspnea, Sputum Production, Cough or Hemoptysis.    GASTROINTESTINAL- Reports: Abdominal Pain. Denies: Heartburn.    Medications      Current Outpatient Prescriptions:   •  aspirin 81 MG EC tablet, Take 81 mg by mouth daily., Disp: , Rfl:   •  atorvastatin (LIPITOR) 20 MG tablet, Take 1 tablet by mouth Daily., Disp: 90 tablet, Rfl: 3  •  buPROPion XL (WELLBUTRIN XL) 300 MG 24 hr tablet, Take 1 tablet by mouth Every Morning., Disp: 90 tablet, Rfl: 3  •  ciprofloxacin (CIPRO) 500 MG tablet, Take 1 tablet by mouth 2 (Two) Times a Day for 10 days., Disp: 20 tablet, Rfl: 0  •  FLUoxetine (PROzac) 20 MG tablet, Take 1 tablet by mouth Daily for 90 days., Disp: 30 tablet, Rfl: 2  •  ibuprofen (ADVIL,MOTRIN) 800 MG tablet, Take 1 tablet by mouth Every 8 (Eight) Hours As Needed for Moderate Pain (4-6)., Disp: 21 tablet, Rfl: 0  •  meloxicam (MOBIC) 15 MG tablet, Take 1 tablet by mouth Daily., Disp: 10 tablet, Rfl: 0  •  metroNIDAZOLE (FLAGYL) 500 MG  tablet, Take 1 tablet by mouth 2 (Two) Times a Day for 10 days., Disp: 20 tablet, Rfl: 0  •  cyclobenzaprine (FLEXERIL) 10 MG tablet, Take 1 tablet by mouth 3 (Three) Times a Day As Needed for muscle spasms., Disp: 30 tablet, Rfl: 0     Allergies    Allergies   Allergen Reactions   • Lortab [Hydrocodone-Acetaminophen] Itching       Problem List    Patient Active Problem List   Diagnosis   • Anxiety   • PFO (patent foramen ovale)   • History of CVA (cerebrovascular accident)   • Obesity       Medications, Allergies, Problems List and Past History were reviewed and updated.    Physical Examination    /72 (BP Location: Left arm, Patient Position: Sitting)  Pulse 72  Temp 97.1 °F (36.2 °C) (Temporal Artery )   Resp 16  Wt 212 lb (96.2 kg)  BMI 32.23 kg/m2    HEENT: Head- Normocephalic Atraumatic. Facies- Within normal limits. Pinnas- Normal texture and shape bilaterally. Canals- Normal bilaterally. TMs- Normal bilaterally. Nares- Patent bilaterally. Nasal Septum- is normal. There is no tenderness to palpation over the frontal or maxillary sinuses. Lids- Normal bilaterally. Conjunctiva- Clear bilaterally. Sclera- Anicteric bilaterally. Oropharynx- Moist with no lesions. Tonsils- No enlargement, erythema or exudate.    Lungs: Auscultation- Clear to auscultation bilaterally. There are no retractions, clubbing or cyanosis. The Expiratory to Inspiratory ratio is equal.    Cardiovascular: There are no carotid bruits. Heart- Normal Rate with Regular rhythm and no murmurs. There are no gallops. There are no rubs. In the lower extremities there is no edema. The upper extremities do not have edema.    Abdomen: Soft, benign, non-tender with no masses, hernias, organomegaly or scars.    Impression and Assessment    Diverticulitis.    Plan    Diverticulitis Plan: A diverticular diet was recommended and explained to the patient. Probiotic foods were encouraged. The patient was instructed to continue the current  medications.      Return to Office    The patient was instructed to return for follow-up in 10 days.    The patient was instructed to return sooner if the condition changes, worsens, or doesn't resolve.

## 2017-04-24 ENCOUNTER — OFFICE VISIT (OUTPATIENT)
Dept: INTERNAL MEDICINE | Facility: CLINIC | Age: 57
End: 2017-04-24

## 2017-04-24 VITALS
BODY MASS INDEX: 31.22 KG/M2 | HEART RATE: 94 BPM | WEIGHT: 206 LBS | SYSTOLIC BLOOD PRESSURE: 110 MMHG | DIASTOLIC BLOOD PRESSURE: 70 MMHG | HEIGHT: 68 IN | TEMPERATURE: 98.2 F | RESPIRATION RATE: 16 BRPM

## 2017-04-24 DIAGNOSIS — R10.30 LOWER ABDOMINAL PAIN: Primary | ICD-10-CM

## 2017-04-24 DIAGNOSIS — Z12.11 SCREENING FOR COLON CANCER: ICD-10-CM

## 2017-04-24 DIAGNOSIS — M54.50 ACUTE LEFT-SIDED LOW BACK PAIN WITHOUT SCIATICA: ICD-10-CM

## 2017-04-24 LAB
ALBUMIN SERPL-MCNC: 4.5 G/DL (ref 3.2–4.8)
ALBUMIN/GLOB SERPL: 1.7 G/DL (ref 1.5–2.5)
ALP SERPL-CCNC: 65 U/L (ref 25–100)
ALT SERPL W P-5'-P-CCNC: 43 U/L (ref 7–40)
ANION GAP SERPL CALCULATED.3IONS-SCNC: 5 MMOL/L (ref 3–11)
AST SERPL-CCNC: 40 U/L (ref 0–33)
BASOPHILS # BLD AUTO: 0.04 10*3/MM3 (ref 0–0.2)
BASOPHILS NFR BLD AUTO: 0.5 % (ref 0–1)
BILIRUB SERPL-MCNC: 0.5 MG/DL (ref 0.3–1.2)
BUN BLD-MCNC: 20 MG/DL (ref 9–23)
BUN/CREAT SERPL: 18.2 (ref 7–25)
CALCIUM SPEC-SCNC: 10.2 MG/DL (ref 8.7–10.4)
CHLORIDE SERPL-SCNC: 107 MMOL/L (ref 99–109)
CO2 SERPL-SCNC: 30 MMOL/L (ref 20–31)
CREAT BLD-MCNC: 1.1 MG/DL (ref 0.6–1.3)
DEPRECATED RDW RBC AUTO: 43.6 FL (ref 37–54)
EOSINOPHIL # BLD AUTO: 0.22 10*3/MM3 (ref 0.1–0.3)
EOSINOPHIL NFR BLD AUTO: 2.5 % (ref 0–3)
ERYTHROCYTE [DISTWIDTH] IN BLOOD BY AUTOMATED COUNT: 13.4 % (ref 11.3–14.5)
GFR SERPL CREATININE-BSD FRML MDRD: 70 ML/MIN/1.73
GLOBULIN UR ELPH-MCNC: 2.6 GM/DL
GLUCOSE BLD-MCNC: 84 MG/DL (ref 70–100)
HCT VFR BLD AUTO: 47.3 % (ref 38.9–50.9)
HGB BLD-MCNC: 16 G/DL (ref 13.1–17.5)
IMM GRANULOCYTES # BLD: 0.04 10*3/MM3 (ref 0–0.03)
IMM GRANULOCYTES NFR BLD: 0.5 % (ref 0–0.6)
LYMPHOCYTES # BLD AUTO: 2.16 10*3/MM3 (ref 0.6–4.8)
LYMPHOCYTES NFR BLD AUTO: 25 % (ref 24–44)
MCH RBC QN AUTO: 30.1 PG (ref 27–31)
MCHC RBC AUTO-ENTMCNC: 33.8 G/DL (ref 32–36)
MCV RBC AUTO: 88.9 FL (ref 80–99)
MONOCYTES # BLD AUTO: 0.7 10*3/MM3 (ref 0–1)
MONOCYTES NFR BLD AUTO: 8.1 % (ref 0–12)
NEUTROPHILS # BLD AUTO: 5.47 10*3/MM3 (ref 1.5–8.3)
NEUTROPHILS NFR BLD AUTO: 63.4 % (ref 41–71)
PLATELET # BLD AUTO: 202 10*3/MM3 (ref 150–450)
PMV BLD AUTO: 10.4 FL (ref 6–12)
POTASSIUM BLD-SCNC: 4.4 MMOL/L (ref 3.5–5.5)
PROT SERPL-MCNC: 7.1 G/DL (ref 5.7–8.2)
RBC # BLD AUTO: 5.32 10*6/MM3 (ref 4.2–5.76)
SODIUM BLD-SCNC: 142 MMOL/L (ref 132–146)
WBC NRBC COR # BLD: 8.63 10*3/MM3 (ref 3.5–10.8)

## 2017-04-24 PROCEDURE — 36415 COLL VENOUS BLD VENIPUNCTURE: CPT | Performed by: INTERNAL MEDICINE

## 2017-04-24 PROCEDURE — 80053 COMPREHEN METABOLIC PANEL: CPT | Performed by: INTERNAL MEDICINE

## 2017-04-24 PROCEDURE — 85025 COMPLETE CBC W/AUTO DIFF WBC: CPT | Performed by: INTERNAL MEDICINE

## 2017-04-24 PROCEDURE — 99214 OFFICE O/P EST MOD 30 MIN: CPT | Performed by: INTERNAL MEDICINE

## 2017-04-24 NOTE — PROGRESS NOTES
Chief Complaint   Patient presents with   • Follow-up   • Diverticulitis       History of Present Illness      The patient is being seen for a follow-up of his left lower quadrant abdominal pain. The pain has improved. The pain is characterized as aching and is constant. He is currently not taking a medication.   The pain has been present for one month. It does not radiate. The patient has not had a fever. The patient reports no aggravating factors. The patient has noted no alleviating factors. The patient denies hematuria, dysuria, nausea, vomiting, diarrhea, constipation, a rash, rectal bleeding, urinary hesitancy or urinary frequency. He was treated for diverticulitis with cipro and flagyl and he completed the antibiotics.    The patient presents for follow-up of low back pain. He reports that the pain is worsened. The current treatment consists of no specific therapies.   He does not have associated fever, chills, weight loss, rashes, dysuria, hematuria, or a decreased urine stream. The pain does not radiate. There is no numbness. The patient denies loss of bladder control. The patient denies loss of bowel control. The patient reports lifting and bending aggravate the symptoms. His urology evaluation was reviewed and revealed cysts but was otherwise non-revealing.    Medications      Current Outpatient Prescriptions:   •  aspirin 81 MG EC tablet, Take 81 mg by mouth daily., Disp: , Rfl:   •  atorvastatin (LIPITOR) 20 MG tablet, Take 1 tablet by mouth Daily., Disp: 90 tablet, Rfl: 3  •  buPROPion XL (WELLBUTRIN XL) 300 MG 24 hr tablet, Take 1 tablet by mouth Every Morning., Disp: 90 tablet, Rfl: 3  •  cyclobenzaprine (FLEXERIL) 10 MG tablet, Take 1 tablet by mouth 3 (Three) Times a Day As Needed for muscle spasms., Disp: 30 tablet, Rfl: 0  •  FLUoxetine (PROzac) 20 MG tablet, Take 1 tablet by mouth Daily for 90 days., Disp: 30 tablet, Rfl: 2  •  ibuprofen (ADVIL,MOTRIN) 800 MG tablet, Take 1 tablet by mouth Every  "8 (Eight) Hours As Needed for Moderate Pain (4-6)., Disp: 21 tablet, Rfl: 0  •  meloxicam (MOBIC) 15 MG tablet, Take 1 tablet by mouth Daily., Disp: 10 tablet, Rfl: 0     Allergies    Allergies   Allergen Reactions   • Lortab [Hydrocodone-Acetaminophen] Itching       Problem List    Patient Active Problem List   Diagnosis   • Anxiety   • PFO (patent foramen ovale)   • History of CVA (cerebrovascular accident)   • Obesity       Medications, Allergies, Problems List and Past History were reviewed and updated.    Physical Examination    /70  Pulse 94  Temp 98.2 °F (36.8 °C) (Temporal Artery )   Resp 16  Ht 68\" (172.7 cm)  Wt 206 lb (93.4 kg)  BMI 31.32 kg/m2    HEENT: Head- Normocephalic Atraumatic. Facies- Within normal limits. Pinnas- Normal texture and shape bilaterally. Canals- Normal bilaterally. TMs- Normal bilaterally. Nares- Patent bilaterally. Nasal Septum- is normal. There is no tenderness to palpation over the frontal or maxillary sinuses. Lids- Normal bilaterally. Conjunctiva- Clear bilaterally. Sclera- Anicteric bilaterally. Oropharynx- Moist with no lesions. Tonsils- No enlargement, erythema or exudate.    Lungs: Auscultation- Clear to auscultation bilaterally. There are no retractions, clubbing or cyanosis. The Expiratory to Inspiratory ratio is equal.    Cardiovascular: There are no carotid bruits. Heart- Normal Rate with Regular rhythm and no murmurs. There are no gallops. There are no rubs. In the lower extremities there is no edema. The upper extremities do not have edema.    Abdomen: Soft, benign, non-tender with no masses, hernias, organomegaly or scars.    Back: The patient has a normal spinal curvature with no evidence of scoliosis. There are no skin lesions noted on the back. Palpation reveals no tenderness and normal muscle tone. The straight leg raising test is negative. The back has normal flexion, extension, rotation, and lateral bending.    Impression and Assessment    Left " Lower Quadrant Abdominal Pain which is non-specific.    Low Back Pain.    Plan    Left Lower Quadrant Abdominal Pain Plan: The patient is due for a colonoscopy. The following tests were ordered: Colonoscopy.    Low Back Pain Plan: He was referred to physical therapy.    Koffi was seen today for follow-up and diverticulitis.    Diagnoses and all orders for this visit:    Lower abdominal pain  -     Ambulatory Referral For Screening Colonoscopy  -     Comprehensive Metabolic Panel  -     CBC & Differential  -     CBC Auto Differential    Acute left-sided low back pain without sciatica  -     Ambulatory Referral to Physical Therapy Evaluate and treat  -     Comprehensive Metabolic Panel  -     CBC & Differential  -     CBC Auto Differential    Screening for colon cancer  -     Ambulatory Referral For Screening Colonoscopy        Return to Office    The patient was instructed to return for follow-up in 1 month.    The patient was instructed to return sooner if the condition changes, worsens, or doesn't resolve.

## 2017-05-24 ENCOUNTER — OFFICE VISIT (OUTPATIENT)
Dept: INTERNAL MEDICINE | Facility: CLINIC | Age: 57
End: 2017-05-24

## 2017-05-24 VITALS
HEART RATE: 76 BPM | WEIGHT: 209 LBS | SYSTOLIC BLOOD PRESSURE: 122 MMHG | RESPIRATION RATE: 16 BRPM | BODY MASS INDEX: 31.78 KG/M2 | TEMPERATURE: 99.2 F | DIASTOLIC BLOOD PRESSURE: 74 MMHG

## 2017-05-24 DIAGNOSIS — R10.30 LOWER ABDOMINAL PAIN: Primary | ICD-10-CM

## 2017-05-24 PROCEDURE — 99212 OFFICE O/P EST SF 10 MIN: CPT | Performed by: INTERNAL MEDICINE

## 2017-06-04 DIAGNOSIS — F41.9 ANXIETY: ICD-10-CM

## 2017-06-05 DIAGNOSIS — F41.9 ANXIETY: ICD-10-CM

## 2017-06-05 RX ORDER — FLUOXETINE 20 MG/1
TABLET, FILM COATED ORAL
Qty: 30 TABLET | Refills: 1 | Status: SHIPPED | OUTPATIENT
Start: 2017-06-05 | End: 2017-07-10 | Stop reason: SDUPTHER

## 2017-06-05 RX ORDER — FLUOXETINE 20 MG/1
TABLET, FILM COATED ORAL
Qty: 30 TABLET | Refills: 1 | Status: SHIPPED | OUTPATIENT
Start: 2017-06-05 | End: 2017-07-13 | Stop reason: SDUPTHER

## 2017-07-10 DIAGNOSIS — F41.9 ANXIETY: ICD-10-CM

## 2017-07-10 RX ORDER — FLUOXETINE 20 MG/1
20 TABLET, FILM COATED ORAL DAILY
Qty: 30 TABLET | Refills: 1 | Status: SHIPPED | OUTPATIENT
Start: 2017-07-10 | End: 2017-09-06 | Stop reason: SDUPTHER

## 2017-07-13 DIAGNOSIS — F41.9 ANXIETY: ICD-10-CM

## 2017-07-13 RX ORDER — FLUOXETINE 20 MG/1
20 TABLET, FILM COATED ORAL DAILY
Qty: 90 TABLET | Refills: 1 | Status: SHIPPED | OUTPATIENT
Start: 2017-07-13 | End: 2017-11-10 | Stop reason: SDUPTHER

## 2017-07-25 ENCOUNTER — OFFICE VISIT (OUTPATIENT)
Dept: NEUROLOGY | Facility: CLINIC | Age: 57
End: 2017-07-25

## 2017-07-25 VITALS
BODY MASS INDEX: 32.58 KG/M2 | SYSTOLIC BLOOD PRESSURE: 118 MMHG | DIASTOLIC BLOOD PRESSURE: 70 MMHG | HEART RATE: 72 BPM | WEIGHT: 215 LBS | HEIGHT: 68 IN

## 2017-07-25 DIAGNOSIS — I73.9 MICROANGIOPATHY (HCC): ICD-10-CM

## 2017-07-25 DIAGNOSIS — R41.840 CONCENTRATION DEFICIT: Primary | ICD-10-CM

## 2017-07-25 DIAGNOSIS — F41.9 ANXIETY: ICD-10-CM

## 2017-07-25 PROCEDURE — 99213 OFFICE O/P EST LOW 20 MIN: CPT | Performed by: PSYCHIATRY & NEUROLOGY

## 2017-07-25 RX ORDER — NAPROXEN 500 MG/1
500 TABLET ORAL 2 TIMES DAILY PRN
COMMUNITY
End: 2019-01-30 | Stop reason: CLARIF

## 2017-07-25 NOTE — PROGRESS NOTES
Subjective:     Patient ID: Koffi Melo is a 56 y.o. male.    History of Present Illness  The following portions of the patient's history were reviewed and updated as appropriate: allergies, current medications, past family history, past medical history, past social history, past surgical history and problem list.  Denies any stroke symptoms, memory and concentration about the same. Continues on Wellbutrin and Lipitor, low dose aspirin.  Review of Systems   Constitutional: Negative for chills, fatigue, fever and unexpected weight change.   HENT: Negative for ear pain, hearing loss, nosebleeds, rhinorrhea and sore throat.    Eyes: Negative for photophobia, pain, discharge, itching and visual disturbance.   Respiratory: Negative for cough, chest tightness, shortness of breath and wheezing.    Cardiovascular: Negative for chest pain, palpitations and leg swelling.   Gastrointestinal: Negative for abdominal pain, blood in stool, constipation, diarrhea, nausea and vomiting.   Genitourinary: Negative for dysuria, frequency, hematuria and urgency.   Musculoskeletal: Negative for arthralgias, back pain, gait problem, joint swelling, myalgias, neck pain and neck stiffness.   Skin: Negative for rash and wound.   Allergic/Immunologic: Negative for environmental allergies and food allergies.   Neurological: Positive for speech difficulty. Negative for dizziness, tremors, seizures, syncope, weakness, light-headedness, numbness and headaches.   Hematological: Negative for adenopathy. Does not bruise/bleed easily.   Psychiatric/Behavioral: Negative for agitation, confusion, decreased concentration, hallucinations, sleep disturbance and suicidal ideas. The patient is not nervous/anxious.         Objective:    Neurologic Exam     Mental Status   Oriented to person, place, and time.       Physical Exam   Constitutional: He is oriented to person, place, and time. He appears well-developed and well-nourished.    Cardiovascular: Normal rate and regular rhythm.    Pulmonary/Chest: Effort normal.   Neurological: He is alert and oriented to person, place, and time. He has normal reflexes.   Psychiatric: He has a normal mood and affect. His behavior is normal. Thought content normal.       Assessment/Plan:     Koffi was seen today for memory loss.    Diagnoses and all orders for this visit:    Concentration deficit    Anxiety    Microangiopathy       Encouraged to continue current course, stay active, report new symptoms immediately.

## 2017-09-06 ENCOUNTER — OFFICE VISIT (OUTPATIENT)
Dept: INTERNAL MEDICINE | Facility: CLINIC | Age: 57
End: 2017-09-06

## 2017-09-06 VITALS
WEIGHT: 212 LBS | HEART RATE: 72 BPM | DIASTOLIC BLOOD PRESSURE: 84 MMHG | SYSTOLIC BLOOD PRESSURE: 124 MMHG | RESPIRATION RATE: 16 BRPM | BODY MASS INDEX: 32.23 KG/M2 | TEMPERATURE: 97.8 F

## 2017-09-06 DIAGNOSIS — K57.32 DIVERTICULITIS OF LARGE INTESTINE WITHOUT PERFORATION OR ABSCESS WITHOUT BLEEDING: Primary | ICD-10-CM

## 2017-09-06 LAB
ALBUMIN SERPL-MCNC: 4.4 G/DL (ref 3.2–4.8)
ALBUMIN/GLOB SERPL: 1.8 G/DL (ref 1.5–2.5)
ALP SERPL-CCNC: 80 U/L (ref 25–100)
ALT SERPL W P-5'-P-CCNC: 49 U/L (ref 7–40)
ANION GAP SERPL CALCULATED.3IONS-SCNC: 5 MMOL/L (ref 3–11)
AST SERPL-CCNC: 41 U/L (ref 0–33)
BASOPHILS # BLD AUTO: 0.04 10*3/MM3 (ref 0–0.2)
BASOPHILS NFR BLD AUTO: 0.6 % (ref 0–1)
BILIRUB BLD-MCNC: NEGATIVE MG/DL
BILIRUB SERPL-MCNC: 0.6 MG/DL (ref 0.3–1.2)
BUN BLD-MCNC: 19 MG/DL (ref 9–23)
BUN/CREAT SERPL: 17.3 (ref 7–25)
CALCIUM SPEC-SCNC: 9.4 MG/DL (ref 8.7–10.4)
CHLORIDE SERPL-SCNC: 105 MMOL/L (ref 99–109)
CLARITY, POC: CLEAR
CO2 SERPL-SCNC: 29 MMOL/L (ref 20–31)
COLOR UR: YELLOW
CREAT BLD-MCNC: 1.1 MG/DL (ref 0.6–1.3)
DEPRECATED RDW RBC AUTO: 41 FL (ref 37–54)
EOSINOPHIL # BLD AUTO: 0.17 10*3/MM3 (ref 0–0.3)
EOSINOPHIL NFR BLD AUTO: 2.6 % (ref 0–3)
ERYTHROCYTE [DISTWIDTH] IN BLOOD BY AUTOMATED COUNT: 12.6 % (ref 11.3–14.5)
EXPIRATION DATE: NORMAL
GFR SERPL CREATININE-BSD FRML MDRD: 70 ML/MIN/1.73
GLOBULIN UR ELPH-MCNC: 2.5 GM/DL
GLUCOSE BLD-MCNC: 90 MG/DL (ref 70–100)
GLUCOSE UR STRIP-MCNC: NEGATIVE MG/DL
HCT VFR BLD AUTO: 44.5 % (ref 38.9–50.9)
HGB BLD-MCNC: 14.9 G/DL (ref 13.1–17.5)
IMM GRANULOCYTES # BLD: 0.02 10*3/MM3 (ref 0–0.03)
IMM GRANULOCYTES NFR BLD: 0.3 % (ref 0–0.6)
KETONES UR QL: NEGATIVE
LEUKOCYTE EST, POC: NEGATIVE
LYMPHOCYTES # BLD AUTO: 1.45 10*3/MM3 (ref 0.6–4.8)
LYMPHOCYTES NFR BLD AUTO: 22.1 % (ref 24–44)
Lab: NORMAL
MCH RBC QN AUTO: 30 PG (ref 27–31)
MCHC RBC AUTO-ENTMCNC: 33.5 G/DL (ref 32–36)
MCV RBC AUTO: 89.7 FL (ref 80–99)
MONOCYTES # BLD AUTO: 0.44 10*3/MM3 (ref 0–1)
MONOCYTES NFR BLD AUTO: 6.7 % (ref 0–12)
NEUTROPHILS # BLD AUTO: 4.44 10*3/MM3 (ref 1.5–8.3)
NEUTROPHILS NFR BLD AUTO: 67.7 % (ref 41–71)
NITRITE UR-MCNC: NEGATIVE MG/ML
PH UR: 6 [PH] (ref 5–8)
PLATELET # BLD AUTO: 181 10*3/MM3 (ref 150–450)
PMV BLD AUTO: 9.9 FL (ref 6–12)
POTASSIUM BLD-SCNC: 4.7 MMOL/L (ref 3.5–5.5)
PROT SERPL-MCNC: 6.9 G/DL (ref 5.7–8.2)
PROT UR STRIP-MCNC: NEGATIVE MG/DL
RBC # BLD AUTO: 4.96 10*6/MM3 (ref 4.2–5.76)
RBC # UR STRIP: NEGATIVE /UL
SODIUM BLD-SCNC: 139 MMOL/L (ref 132–146)
SP GR UR: 1.02 (ref 1–1.03)
UROBILINOGEN UR QL: NORMAL
WBC NRBC COR # BLD: 6.56 10*3/MM3 (ref 3.5–10.8)

## 2017-09-06 PROCEDURE — 80053 COMPREHEN METABOLIC PANEL: CPT | Performed by: INTERNAL MEDICINE

## 2017-09-06 PROCEDURE — 81003 URINALYSIS AUTO W/O SCOPE: CPT | Performed by: INTERNAL MEDICINE

## 2017-09-06 PROCEDURE — 99214 OFFICE O/P EST MOD 30 MIN: CPT | Performed by: INTERNAL MEDICINE

## 2017-09-06 PROCEDURE — 85025 COMPLETE CBC W/AUTO DIFF WBC: CPT | Performed by: INTERNAL MEDICINE

## 2017-09-06 PROCEDURE — 87086 URINE CULTURE/COLONY COUNT: CPT | Performed by: INTERNAL MEDICINE

## 2017-09-06 PROCEDURE — 36415 COLL VENOUS BLD VENIPUNCTURE: CPT | Performed by: INTERNAL MEDICINE

## 2017-09-06 RX ORDER — CIPROFLOXACIN 500 MG/1
500 TABLET, FILM COATED ORAL 2 TIMES DAILY
Qty: 20 TABLET | Refills: 0 | Status: SHIPPED | OUTPATIENT
Start: 2017-09-06 | End: 2018-01-03

## 2017-09-06 RX ORDER — METRONIDAZOLE 500 MG/1
500 TABLET ORAL 2 TIMES DAILY
Qty: 20 TABLET | Refills: 0 | Status: SHIPPED | OUTPATIENT
Start: 2017-09-06 | End: 2018-01-03

## 2017-09-06 NOTE — PROGRESS NOTES
Chief Complaint   Patient presents with   • LLQ ABD PAIN       History of Present Illness      The patient presents for an acute visit for left lower quadrant abdominal pain. The patient reports a one week history of abdominal pain. The pain is in the LLQ. The pain does not radiate. The pain is characterized as aching, a full feeling in the abdomen, pressure and stabbing. The pain is constant. The patient has not had a fever. The patient reports that the pain is not aggravated by motion, food, medication, hunger or alcohol.   The patient has noted no alleviating factors. The patient also denies hematuria, dysuria, nausea, vomiting, diarrhea, constipation, a rash, rectal bleeding, urinary hesitancy or urinary frequency. There has been no testicular pain. The patient has not had a past history of abdominal surgery.    Review of Systems    GENERAL- Denies Unexplained Weight Loss, Fever, Chills, Sweats, Fatigue, Weakness or Malaise.    GASTROINTESTINAL- Reports: Abdominal Pain..    GENITOURINARY- Reports: Decreased Urine Stream. Denies: Penile Discharge, Erectile Dysfunction, Testicular Mass, Testicular Pain, Hernia, Nocturia, Incomplete Voiding, Urinary Frequency or Urinary Urgency.    Medications      Current Outpatient Prescriptions:   •  aspirin 81 MG EC tablet, Take 81 mg by mouth daily., Disp: , Rfl:   •  atorvastatin (LIPITOR) 20 MG tablet, Take 1 tablet by mouth Daily., Disp: 90 tablet, Rfl: 3  •  buPROPion XL (WELLBUTRIN XL) 300 MG 24 hr tablet, Take 1 tablet by mouth Every Morning., Disp: 90 tablet, Rfl: 3  •  cyclobenzaprine (FLEXERIL) 10 MG tablet, Take 1 tablet by mouth 3 (Three) Times a Day As Needed for muscle spasms., Disp: 30 tablet, Rfl: 0  •  FLUoxetine (PROzac) 20 MG tablet, Take 1 tablet by mouth Daily., Disp: 90 tablet, Rfl: 1  •  naproxen (NAPROSYN) 500 MG tablet, Take 500 mg by mouth 2 (Two) Times a Day With Meals., Disp: , Rfl:   •  ciprofloxacin (CIPRO) 500 MG tablet, Take 1 tablet by mouth 2  (Two) Times a Day., Disp: 20 tablet, Rfl: 0  •  metroNIDAZOLE (FLAGYL) 500 MG tablet, Take 1 tablet by mouth 2 (Two) Times a Day., Disp: 20 tablet, Rfl: 0     Allergies    Allergies   Allergen Reactions   • Lortab [Hydrocodone-Acetaminophen] Itching       Problem List    Patient Active Problem List   Diagnosis   • Anxiety   • PFO (patent foramen ovale)   • History of CVA (cerebrovascular accident)   • Obesity   • Concentration deficit   • Microangiopathy       Medications, Allergies, Problems List and Past History were reviewed and updated.    Physical Examination    /84 (BP Location: Left arm, Patient Position: Sitting, Cuff Size: Adult)  Pulse 72  Temp 97.8 °F (36.6 °C) (Temporal Artery )   Resp 16  Wt 212 lb (96.2 kg)  BMI 32.23 kg/m2    HEENT: Head- Normocephalic Atraumatic. Facies- Within normal limits. Pinnas- Normal texture and shape bilaterally. Canals- Normal bilaterally. TMs- Normal bilaterally. Nares- Patent bilaterally. Nasal Septum- is normal. There is no tenderness to palpation over the frontal or maxillary sinuses. Lids- Normal bilaterally. Conjunctiva- Clear bilaterally. Sclera- Anicteric bilaterally. Oropharynx- Moist with no lesions. Tonsils- No enlargement, erythema or exudate.    Lungs: Auscultation- Clear to auscultation bilaterally. There are no retractions, clubbing or cyanosis. The Expiratory to Inspiratory ratio is equal.    Cardiovascular: There are no carotid bruits. Heart- Normal Rate with Regular rhythm and no murmurs. There are no gallops. There are no rubs. In the lower extremities there is no edema. The upper extremities do not have edema.    Abdomen: Noted to have tenderness but is not noted to have rigidity, a mass, a hernia, an enlarged liver, an enlarged spleen, an enlarged right kidney, an enlarged left kidney, pulsatile mass or scarring. The tenderness is located in the left lower quadrant. The tenderness is associated with guarding but not rebound.    Impression and  Assessment    Left Lower Quadrant Abdominal Pain consistent with diverticulitis.    Plan    Left Lower Quadrant Abdominal Pain Plan: A bland diet was recommended. Increased fiber was encouraged. He was instructed to use stool softeners daily. Medication will be added as noted below.    Koffi was seen today for llq abd pain.    Diagnoses and all orders for this visit:    Diverticulitis of large intestine without perforation or abscess without bleeding  -     ciprofloxacin (CIPRO) 500 MG tablet; Take 1 tablet by mouth 2 (Two) Times a Day.  -     metroNIDAZOLE (FLAGYL) 500 MG tablet; Take 1 tablet by mouth 2 (Two) Times a Day.  -     CBC & Differential  -     POC Urinalysis Dipstick, Automated  -     Urine Culture  -     Comprehensive Metabolic Panel  -     CBC Auto Differential        Return to Office    The patient was instructed to return for follow-up in 1 week.    The patient was instructed to return sooner if the condition changes, worsens, or doesn't resolve.

## 2017-09-08 LAB — BACTERIA SPEC AEROBE CULT: NORMAL

## 2017-09-14 ENCOUNTER — OFFICE VISIT (OUTPATIENT)
Dept: INTERNAL MEDICINE | Facility: CLINIC | Age: 57
End: 2017-09-14

## 2017-09-14 VITALS
BODY MASS INDEX: 33.15 KG/M2 | HEART RATE: 72 BPM | WEIGHT: 218 LBS | TEMPERATURE: 97.9 F | RESPIRATION RATE: 18 BRPM | DIASTOLIC BLOOD PRESSURE: 78 MMHG | SYSTOLIC BLOOD PRESSURE: 110 MMHG

## 2017-09-14 DIAGNOSIS — K57.32 DIVERTICULITIS OF LARGE INTESTINE WITHOUT PERFORATION OR ABSCESS WITHOUT BLEEDING: Primary | ICD-10-CM

## 2017-09-14 PROCEDURE — 99213 OFFICE O/P EST LOW 20 MIN: CPT | Performed by: INTERNAL MEDICINE

## 2017-09-14 NOTE — PROGRESS NOTES
Chief Complaint   Patient presents with   • Follow-up     1 WEEK       History of Present Illness      The patient present for a follow-up of his diverticulitis. The pain is worsened and is located in the left lower quadrant. He is currently not taking a medication.   The pain has been present for three weeks. It radiates to the back. The patient has not had a fever. The patient reports that the pain is not aggravated by motion, foods, medications, alcohol or hunger. The patient has noted no alleviating factors. The patient also denies hematuria, dysuria, nausea, vomiting, diarrhea, constipation, a rash, rectal bleeding, urinary retention or urinary frequency.    Review of Systems    GENERAL- Denies Unexplained Weight Loss, Chills, Sweats, Fatigue, Weakness or Malaise.    GASTROINTESTINAL- Reports: Abdominal Pain. Denies: Heartburn.    Medications      Current Outpatient Prescriptions:   •  aspirin 81 MG EC tablet, Take 81 mg by mouth daily., Disp: , Rfl:   •  atorvastatin (LIPITOR) 20 MG tablet, Take 1 tablet by mouth Daily., Disp: 90 tablet, Rfl: 3  •  buPROPion XL (WELLBUTRIN XL) 300 MG 24 hr tablet, Take 1 tablet by mouth Every Morning., Disp: 90 tablet, Rfl: 3  •  ciprofloxacin (CIPRO) 500 MG tablet, Take 1 tablet by mouth 2 (Two) Times a Day., Disp: 20 tablet, Rfl: 0  •  cyclobenzaprine (FLEXERIL) 10 MG tablet, Take 1 tablet by mouth 3 (Three) Times a Day As Needed for muscle spasms., Disp: 30 tablet, Rfl: 0  •  FLUoxetine (PROzac) 20 MG tablet, Take 1 tablet by mouth Daily., Disp: 90 tablet, Rfl: 1  •  metroNIDAZOLE (FLAGYL) 500 MG tablet, Take 1 tablet by mouth 2 (Two) Times a Day., Disp: 20 tablet, Rfl: 0  •  naproxen (NAPROSYN) 500 MG tablet, Take 500 mg by mouth 2 (Two) Times a Day With Meals., Disp: , Rfl:      Allergies    Allergies   Allergen Reactions   • Lortab [Hydrocodone-Acetaminophen] Itching       Problem List    Patient Active Problem List   Diagnosis   • Anxiety   • PFO (patent foramen ovale)    • History of CVA (cerebrovascular accident)   • Obesity   • Concentration deficit   • Microangiopathy       Medications, Allergies, Problems List and Past History were reviewed and updated.    Physical Examination    /78 (BP Location: Left arm, Patient Position: Sitting, Cuff Size: Adult)  Pulse 72  Temp 97.9 °F (36.6 °C) (Temporal Artery )   Resp 18  Wt 218 lb (98.9 kg)  BMI 33.15 kg/m2    HEENT: Head- Normocephalic Atraumatic. Facies- Within normal limits. Pinnas- Normal texture and shape bilaterally. Canals- Normal bilaterally. TMs- Normal bilaterally. Nares- Patent bilaterally. Nasal Septum- is normal. There is no tenderness to palpation over the frontal or maxillary sinuses. Lids- Normal bilaterally. Conjunctiva- Clear bilaterally. Sclera- Anicteric bilaterally. Oropharynx- Moist with no lesions. Tonsils- No enlargement, erythema or exudate.    Lungs: Auscultation- Clear to auscultation bilaterally. There are no retractions, clubbing or cyanosis. The Expiratory to Inspiratory ratio is equal.    Cardiovascular: There are no carotid bruits. Heart- Normal Rate with Regular rhythm and no murmurs. There are no gallops. There are no rubs. In the lower extremities there is no edema. The upper extremities do not have edema.    Abdomen: Noted to have tenderness but is not noted to have rigidity, a mass, a hernia, an enlarged liver, an enlarged spleen, an enlarged right kidney, an enlarged left kidney, pulsatile mass or scarring. The tenderness is located in the left lower quadrant. The tenderness is not associated with rebound or guarding.    Impression and Assessment    Diverticulitis.    Left Lower Quadrant Abdominal Pain consistent with diverticulitis.    Plan    Diverticulitis Plan: Further plans will be made after the tests are reviewed.    Left Lower Quadrant Abdominal Pain Plan: Further plans will be made after the tests are reviewed.    Koffi was seen today for follow-up.    Diagnoses and all  orders for this visit:    Diverticulitis of large intestine without perforation or abscess without bleeding  -     CT Abdomen Pelvis With & Without Contrast; Future        Return to Office    The patient was instructed to return for follow-up in 2 weeks.    The patient was instructed to return sooner if the condition changes, worsens, or doesn't resolve.

## 2017-09-15 ENCOUNTER — HOSPITAL ENCOUNTER (OUTPATIENT)
Dept: CT IMAGING | Facility: HOSPITAL | Age: 57
Discharge: HOME | End: 2017-09-15
Attending: INTERNAL MEDICINE | Admitting: INTERNAL MEDICINE

## 2017-09-15 DIAGNOSIS — K57.32 DIVERTICULITIS OF LARGE INTESTINE WITHOUT PERFORATION OR ABSCESS WITHOUT BLEEDING: ICD-10-CM

## 2017-09-15 PROCEDURE — 0 IOPAMIDOL 61 % SOLUTION: Performed by: INTERNAL MEDICINE

## 2017-09-15 PROCEDURE — 0 DIATRIZOATE MEGLUMINE & SODIUM PER 1 ML: Performed by: INTERNAL MEDICINE

## 2017-09-15 PROCEDURE — 74178 CT ABD&PLV WO CNTR FLWD CNTR: CPT

## 2017-09-15 RX ADMIN — IOPAMIDOL 90 ML: 612 INJECTION, SOLUTION INTRAVENOUS at 13:20

## 2017-09-15 RX ADMIN — DIATRIZOATE MEGLUMINE AND DIATRIZOATE SODIUM 15 ML: 660; 100 LIQUID ORAL; RECTAL at 13:20

## 2017-09-18 ENCOUNTER — TELEPHONE (OUTPATIENT)
Dept: INTERNAL MEDICINE | Facility: CLINIC | Age: 57
End: 2017-09-18

## 2017-09-18 NOTE — TELEPHONE ENCOUNTER
S/W PT, REVIEWED RESULT NOTE WITH PT.  VERB GOOD UNDERSTANDING BUT STATES HE DOES NOT WANT SURGEON REFERRAL RIGHT NOW.  STATES HE IS STILL HAVING SOME PAIN BUT MOSTLY WITH CERTAIN MOVEMENTS AND ACTIVITIES.  STATES HE WILL CALL BACK IF WORSENS OR CHANGES HIS MIND REGARDING REFERRAL.  VERB GREAT APPREC.

## 2017-09-18 NOTE — TELEPHONE ENCOUNTER
----- Message from Jil Gallego RN sent at 9/18/2017  1:48 PM EDT -----      ----- Message -----     From: Irma Villegas     Sent: 9/18/2017  12:06 PM       To: Jil Gallego RN    Patient called in regards of his CT scan he had done on 09/15/2017 patient is requesting his results. Patient has also requested a call back when available.     Call back number: [phone]

## 2017-09-19 ENCOUNTER — TELEPHONE (OUTPATIENT)
Dept: INTERNAL MEDICINE | Facility: CLINIC | Age: 57
End: 2017-09-19

## 2017-09-19 NOTE — TELEPHONE ENCOUNTER
PT WIFE, WEN, ON ELENI.    LMVM FOR PT WIFE, WEN, THAT I WAS RETURNING HER CALL AND TO PLEASE CALL BACK.  OFC. # GIVEN.

## 2017-09-19 NOTE — TELEPHONE ENCOUNTER
----- Message from Irma Villegas sent at 9/19/2017  3:45 PM EDT -----  Patient's wife Sheila Melo called in regards to patient's CT scan results. When available please call back with the status on these results.     Call back number: [phone]

## 2017-09-19 NOTE — TELEPHONE ENCOUNTER
PT ON ELENI IN CHART.    LMVM FOR PT WIFE, WEN, THAT I WAS RETURNING HER CALL AND TO PLEASE CALL BACK.  OFC. # GIVEN.

## 2017-09-19 NOTE — TELEPHONE ENCOUNTER
----- Message from Irma Villegas sent at 9/19/2017  3:45 PM EDT -----  Patient's wife Sheila Melo called in regards to patient's CT scan results. When available please call back with the status on these results.     Call back number: [phone]

## 2017-09-20 NOTE — TELEPHONE ENCOUNTER
S/W PT WIFE, WEN, WHO IS ON ELENI.    STATES PT DID GET RESULTS OF CT SCAN BUT WASN'T SURE WHY DR MILLIGAN WANTED HIM TO HAVE SURGERY.  EXPL THAT DR MILLIGAN DID NOT RECOMMEND SURGERY BUT RECOMMENDED A SURGICAL CONSULTATION.  EXPL THAT HIS CT SCAN SHOWED DIVERTICULOSIS BUT NO DIVERTICULITIS OR ACTIVE INFECTION AND THAT SINCE HE WAS STILL HAVING PAIN DR MILLIGAN  JUST RECOMMENDED A SURGICAL CONSULTATION TO SEE IF COULD DETERMINE CAUSE OF PAIN.  VERB GOOD UNDERSTANDING.  EXPL THAT PT DECLINED REFERRAL AT THIS TIME AND SAID HE WOULD CALL BACK IF CHANGED MIND.  STATES THIS MAKES MUCH MORE SENSE AND THAT SHE WILL DISCUSS WITH PT AND THEY WILL CALL BACK IF HE DECIDES TO GO WITH THE REFERRAL.

## 2017-10-12 ENCOUNTER — TELEPHONE (OUTPATIENT)
Dept: INTERNAL MEDICINE | Facility: CLINIC | Age: 57
End: 2017-10-12

## 2017-10-12 DIAGNOSIS — K57.32 DIVERTICULITIS OF LARGE INTESTINE WITHOUT PERFORATION OR ABSCESS WITHOUT BLEEDING: Primary | ICD-10-CM

## 2017-10-12 NOTE — TELEPHONE ENCOUNTER
----- Message from Maryjane Stover sent at 10/12/2017  4:10 PM EDT -----  Patient called and said he is ready to get a referral to a colorectal surgeon for his diverticulitis    p. [phone]

## 2017-10-12 NOTE — TELEPHONE ENCOUNTER
Please schedule this appt and let patient know.  DX is recurrent diverticulitis.  Daniel Salazar MD  4:29 PM  10/12/17

## 2017-10-18 ENCOUNTER — TELEPHONE (OUTPATIENT)
Dept: INTERNAL MEDICINE | Facility: CLINIC | Age: 57
End: 2017-10-18

## 2017-10-18 NOTE — TELEPHONE ENCOUNTER
----- Message from Diana Grissom sent at 10/18/2017 10:55 AM EDT -----  Pt has a pace maker that is causing arm/shoulder pain. He would like to have a referral to Orthopedics. He would like something around [place], [place].      Patient: [phone]

## 2017-10-30 NOTE — TELEPHONE ENCOUNTER
If he has pain from his pacemaker, he should first call the cardiologist who inserted it before going to ortho.  Daniel Salazar MD  8:48 PM  10/29/17

## 2017-11-10 ENCOUNTER — TELEPHONE (OUTPATIENT)
Dept: INTERNAL MEDICINE | Facility: CLINIC | Age: 57
End: 2017-11-10

## 2017-11-10 DIAGNOSIS — F41.9 ANXIETY: ICD-10-CM

## 2017-11-10 RX ORDER — FLUOXETINE 20 MG/1
20 TABLET, FILM COATED ORAL DAILY
Qty: 90 TABLET | Refills: 1 | Status: SHIPPED | OUTPATIENT
Start: 2017-11-10 | End: 2017-11-16 | Stop reason: SDUPTHER

## 2017-11-10 NOTE — TELEPHONE ENCOUNTER
----- Message from Diana Grissom sent at 11/10/2017 11:23 AM EST -----  Pt is needing new rx for Prozac, 90 day supply       [facility]    Patient [phone]

## 2017-11-16 DIAGNOSIS — F41.9 ANXIETY: ICD-10-CM

## 2017-11-16 RX ORDER — FLUOXETINE 20 MG/1
20 TABLET, FILM COATED ORAL DAILY
Qty: 90 TABLET | Refills: 1 | Status: SHIPPED | OUTPATIENT
Start: 2017-11-16 | End: 2017-11-16 | Stop reason: SDUPTHER

## 2017-11-16 RX ORDER — FLUOXETINE 20 MG/1
20 TABLET, FILM COATED ORAL DAILY
Qty: 10 TABLET | Refills: 0 | Status: SHIPPED | OUTPATIENT
Start: 2017-11-16 | End: 2018-03-05 | Stop reason: SDUPTHER

## 2017-11-16 NOTE — TELEPHONE ENCOUNTER
S/W PT, WILL SEND [facility] DAY RX TO [facility] PER PT REQUEST.  [facility] IF CAN F[facility] 10 DAY SUPPLY TO DO UNTIL MAIL ORDER ARRIVES WITH TOO HIGH OF COST.    RX SENT VIA [facility].

## 2017-12-13 ENCOUNTER — TELEPHONE (OUTPATIENT)
Dept: INTERNAL MEDICINE | Facility: CLINIC | Age: 57
End: 2017-12-13

## 2017-12-13 NOTE — TELEPHONE ENCOUNTER
Spoke with patient.  His hand is very swollen and painful and he can hardly move his fingers.  Instructed to go to ER tonight.  Patient agreed.  Daniel Salazar MD  5:53 PM  12/13/17

## 2017-12-14 ENCOUNTER — TELEPHONE (OUTPATIENT)
Dept: INTERNAL MEDICINE | Facility: CLINIC | Age: 57
End: 2017-12-14

## 2017-12-14 NOTE — TELEPHONE ENCOUNTER
----- Message from Sidra Tristan sent at 12/14/2017  8:29 AM EST -----  Dr. Salazar instructed patient to go to ER for right hand being swollen and painful.  ER said he needed to be set up with Ortho doc to look at hand.  He has been to [facility] before.  ER doc said he needed to be seen today.  Right hand still swollen.  CT negative and will bring disc.  Call patient at [phone] with appointment info.

## 2017-12-15 ENCOUNTER — LAB (OUTPATIENT)
Dept: LAB | Facility: HOSPITAL | Age: 57
End: 2017-12-15

## 2017-12-15 ENCOUNTER — TRANSCRIBE ORDERS (OUTPATIENT)
Dept: LAB | Facility: HOSPITAL | Age: 57
End: 2017-12-15

## 2017-12-15 DIAGNOSIS — M79.641 HAND PAIN, RIGHT: ICD-10-CM

## 2017-12-15 DIAGNOSIS — M79.641 HAND PAIN, RIGHT: Primary | ICD-10-CM

## 2017-12-15 LAB
CRP SERPL-MCNC: 0.21 MG/DL (ref 0–1)
DEPRECATED RDW RBC AUTO: 41.9 FL (ref 37–54)
ERYTHROCYTE [DISTWIDTH] IN BLOOD BY AUTOMATED COUNT: 12.7 % (ref 11.3–14.5)
ERYTHROCYTE [SEDIMENTATION RATE] IN BLOOD: 11 MM/HR (ref 0–20)
HCT VFR BLD AUTO: 48.3 % (ref 38.9–50.9)
HGB BLD-MCNC: 16.2 G/DL (ref 13.1–17.5)
MCH RBC QN AUTO: 30.3 PG (ref 27–31)
MCHC RBC AUTO-ENTMCNC: 33.5 G/DL (ref 32–36)
MCV RBC AUTO: 90.3 FL (ref 80–99)
PLATELET # BLD AUTO: 197 10*3/MM3 (ref 150–450)
PMV BLD AUTO: 10.1 FL (ref 6–12)
RBC # BLD AUTO: 5.35 10*6/MM3 (ref 4.2–5.76)
URATE SERPL-MCNC: 5 MG/DL (ref 3.7–9.2)
WBC NRBC COR # BLD: 8.12 10*3/MM3 (ref 3.5–10.8)

## 2017-12-15 PROCEDURE — 85027 COMPLETE CBC AUTOMATED: CPT

## 2017-12-15 PROCEDURE — 85652 RBC SED RATE AUTOMATED: CPT

## 2017-12-15 PROCEDURE — 36415 COLL VENOUS BLD VENIPUNCTURE: CPT | Performed by: ORTHOPAEDIC SURGERY

## 2017-12-15 PROCEDURE — 86140 C-REACTIVE PROTEIN: CPT | Performed by: ORTHOPAEDIC SURGERY

## 2017-12-15 PROCEDURE — 84550 ASSAY OF BLOOD/URIC ACID: CPT

## 2017-12-22 DIAGNOSIS — I73.9 MICROANGIOPATHY (HCC): ICD-10-CM

## 2017-12-22 RX ORDER — ATORVASTATIN CALCIUM 20 MG/1
TABLET, FILM COATED ORAL
Qty: 90 TABLET | Refills: 3 | Status: SHIPPED | OUTPATIENT
Start: 2017-12-22 | End: 2019-09-17

## 2018-01-03 ENCOUNTER — OFFICE VISIT (OUTPATIENT)
Dept: INTERNAL MEDICINE | Facility: CLINIC | Age: 58
End: 2018-01-03

## 2018-01-03 VITALS
HEART RATE: 68 BPM | TEMPERATURE: 98.1 F | WEIGHT: 220 LBS | RESPIRATION RATE: 16 BRPM | BODY MASS INDEX: 33.45 KG/M2 | DIASTOLIC BLOOD PRESSURE: 72 MMHG | SYSTOLIC BLOOD PRESSURE: 114 MMHG

## 2018-01-03 DIAGNOSIS — R04.0 EPISTAXIS: Primary | ICD-10-CM

## 2018-01-03 PROCEDURE — 99213 OFFICE O/P EST LOW 20 MIN: CPT | Performed by: INTERNAL MEDICINE

## 2018-01-03 NOTE — PROGRESS NOTES
Chief Complaint   Patient presents with   • Nose Bleed       History of Present Illness    The patient presents for evaluation of nose bleeds from the nares bilaterally which have been occuring for one month. There has not been drainage from the nose in addition to the bleeding. The patient's house does not have a humidifier.       He denies a history of nasal trauma. There are no other associated symptoms including a dry cough, a wet cough, wheezing, fever, facial pain, a headache, eye drainage, ear pain, ear drainage, nasal congestion, a rash, nausea, vomiting, diarrhea, chills, decreased appetite, abdominal pain, sore throat or myalgias. The patient has not tried anything for treatment of this illness.     Review of Systems    GENERAL- Denies Unexplained Weight Loss, Sweats, Fatigue, Weakness or Malaise.    HEENT- Denies Eye Pain, Decreased Vision, Sinus Pain, Decreased Hearing, Visual Disturbance, Diplopia or Tinnitus.    Medications      Current Outpatient Prescriptions:   •  aspirin 81 MG EC tablet, Take 81 mg by mouth daily., Disp: , Rfl:   •  atorvastatin (LIPITOR) 20 MG tablet, TAKE 1 TABLET DAILY, Disp: 90 tablet, Rfl: 3  •  buPROPion XL (WELLBUTRIN XL) 300 MG 24 hr tablet, Take 1 tablet by mouth Every Morning., Disp: 90 tablet, Rfl: 3  •  cyclobenzaprine (FLEXERIL) 10 MG tablet, Take 1 tablet by mouth 3 (Three) Times a Day As Needed for muscle spasms., Disp: 30 tablet, Rfl: 0  •  FLUoxetine (PROzac) 20 MG tablet, Take 1 tablet by mouth Daily., Disp: 10 tablet, Rfl: 0  •  naproxen (NAPROSYN) 500 MG tablet, Take 500 mg by mouth 2 (Two) Times a Day As Needed., Disp: , Rfl:      Allergies    Allergies   Allergen Reactions   • Lortab [Hydrocodone-Acetaminophen] Itching       Problem List    Patient Active Problem List   Diagnosis   • Anxiety   • PFO (patent foramen ovale)   • History of CVA (cerebrovascular accident)   • Obesity   • Concentration deficit   • Microangiopathy       Medications, Allergies,  Problems List and Past History were reviewed and updated.    Physical Examination    /72 (BP Location: Left arm, Patient Position: Sitting, Cuff Size: Adult)  Pulse 68  Temp 98.1 °F (36.7 °C) (Temporal Artery )   Resp 16  Wt 99.8 kg (220 lb)  BMI 33.45 kg/m2  HEENT: Head- Normocephalic Atraumatic. Facies- Within normal limits. Pinnas- Normal texture and shape bilaterally. Canals- Normal bilaterally. TMs- Normal bilaterally. Nares- Patent bilaterally. Nasal Septum- is normal. There is no tenderness to palpation over the frontal or maxillary sinuses. Lids- Normal bilaterally. Conjunctiva- Clear bilaterally. Sclera- Anicteric bilaterally. Oropharynx- Moist with no lesions. Tonsils- No enlargement, erythema or exudate.    Neck: Thyroid- non enlarged, symmetric and has no nodules. No bruits are detected. ROM- Normal Range of Motion with no rigidity.    Lungs: Auscultation- Clear to auscultation bilaterally. There are no retractions, clubbing or cyanosis. The Expiratory to Inspiratory ratio is equal.    Cardiovascular: There are no carotid bruits. Heart- Normal Rate with Regular rhythm and no murmurs. There are no gallops. There are no rubs. In the lower extremities there is no edema. The upper extremities do not have edema.    Abdomen: Soft, benign, non-tender with no masses, hernias, organomegaly or scars.    Impression and Assessment    Epistaxis.    Plan    Epistaxis Plan: The patient was referred to ENT. He was told to use saline nasal drops for relief of congestion.    Koffi was seen today for nose bleed.    Diagnoses and all orders for this visit:    Epistaxis  -     Ambulatory Referral to ENT (Otolaryngology)        Return to Office    The patient was instructed to return for follow-up as needed.    The patient was instructed to return sooner if the condition changes, worsens, or doesn't resolve.

## 2018-01-08 ENCOUNTER — TELEPHONE (OUTPATIENT)
Dept: INTERNAL MEDICINE | Facility: CLINIC | Age: 58
End: 2018-01-08

## 2018-01-08 NOTE — TELEPHONE ENCOUNTER
----- Message from Maryjane Stover sent at 1/8/2018  9:49 AM EST -----  Patient needs cardiac clearance for knee surgery he is having on Wednesday. Dr Lozano, who he has seen before can not get him in until the end of the month. Pt wants us to see if we can schedule him with cardiologist this afternoon.  He doesn't have a preference of who he wants to see, do you want to enter referral for this?   Phone # [phone]

## 2018-01-08 NOTE — TELEPHONE ENCOUNTER
I scheduled with dr minor at dr paredes office for tomorrow, but when I called pt, he was able to get this clearance without an appt from dr dejesus. I cancelled this other appt.

## 2018-02-07 ENCOUNTER — OFFICE VISIT (OUTPATIENT)
Dept: INTERNAL MEDICINE | Facility: CLINIC | Age: 58
End: 2018-02-07

## 2018-02-07 VITALS
BODY MASS INDEX: 34.52 KG/M2 | SYSTOLIC BLOOD PRESSURE: 128 MMHG | HEART RATE: 76 BPM | TEMPERATURE: 97.8 F | WEIGHT: 227 LBS | DIASTOLIC BLOOD PRESSURE: 76 MMHG | RESPIRATION RATE: 18 BRPM

## 2018-02-07 DIAGNOSIS — R06.83 SNORING: Primary | ICD-10-CM

## 2018-02-07 PROCEDURE — 99213 OFFICE O/P EST LOW 20 MIN: CPT | Performed by: INTERNAL MEDICINE

## 2018-02-07 RX ORDER — BUPROPION HYDROCHLORIDE 300 MG/1
300 TABLET ORAL DAILY
COMMUNITY
End: 2018-02-26 | Stop reason: SDUPTHER

## 2018-02-08 NOTE — PROGRESS NOTES
Chief Complaint   Patient presents with   • Sleep Apnea       History of Present Illness      The patient presents for evaluation of sleep difficulty. He denies feelings of depression and anxiety. There is not a history of excessive caffeine usage. There is no history of nocturia.    The patient falls asleep easily and typically doesn't awaken at night. The patient snores and reports gasping or stopping breathing at night. He feels tired during the day and he denies falling asleep while watching television, while driving or during conversation.       Review of Systems    HEENT- Denies Eye Pain, Eye Drainage, Nasal Discharge, Sore Throat, Ear Pain, Ear Drainage, Headache, Decreased Vision, Sinus Pain, Nasal Congestion, Decreased Hearing, Visual Disturbance, Diplopia or Tinnitus.    NECK- Denies Decreased Range of Motion, Stiffness, Thyroid Nodules, Enlarged Lymph Nodes or Goiter.    CARDIOVASCULAR- Denies Chest Pain, Claudication, Edema, Syncope or Palpitations.    PULMONARY- Denies Wheezing, Dyspnea, Sputum Production, Cough, Hemoptysis or Pleuritic Chest Pain.    Medications      Current Outpatient Prescriptions:   •  aspirin 81 MG EC tablet, Take 81 mg by mouth daily., Disp: , Rfl:   •  atorvastatin (LIPITOR) 20 MG tablet, TAKE 1 TABLET DAILY, Disp: 90 tablet, Rfl: 3  •  buPROPion XL (WELLBUTRIN XL) 300 MG 24 hr tablet, Take 300 mg by mouth Daily., Disp: , Rfl:   •  cyclobenzaprine (FLEXERIL) 10 MG tablet, Take 1 tablet by mouth 3 (Three) Times a Day As Needed for muscle spasms., Disp: 30 tablet, Rfl: 0  •  FLUoxetine (PROzac) 20 MG tablet, Take 1 tablet by mouth Daily., Disp: 10 tablet, Rfl: 0  •  naproxen (NAPROSYN) 500 MG tablet, Take 500 mg by mouth 2 (Two) Times a Day As Needed., Disp: , Rfl:      Allergies    Allergies   Allergen Reactions   • Lortab [Hydrocodone-Acetaminophen] Itching       Problem List    Patient Active Problem List   Diagnosis   • Anxiety   • PFO (patent foramen ovale)   • History of CVA  (cerebrovascular accident)   • Obesity   • Concentration deficit   • Microangiopathy       Medications, Allergies, Problems List and Past History were reviewed and updated.    Physical Examination    /76 (BP Location: Left arm, Patient Position: Sitting, Cuff Size: Adult)  Pulse 76  Temp 97.8 °F (36.6 °C) (Temporal Artery )   Resp 18  Wt 103 kg (227 lb)  BMI 34.52 kg/m2    HEENT: Head- Normocephalic Atraumatic. Facies- Within normal limits. Pinnas- Normal texture and shape bilaterally. Canals- Normal bilaterally. TMs- Normal bilaterally. Nares- Patent bilaterally. Nasal Septum- is normal. There is no tenderness to palpation over the frontal or maxillary sinuses. Lids- Normal bilaterally. Conjunctiva- Clear bilaterally. Sclera- Anicteric bilaterally. Oropharynx- Moist with no lesions. Tonsils- No enlargement, erythema or exudate.    Neck: Thyroid- non enlarged, symmetric and has no nodules. No bruits are detected. ROM- Normal Range of Motion with no rigidity.    Lungs: Auscultation- Clear to auscultation bilaterally. There are no retractions, clubbing or cyanosis. The Expiratory to Inspiratory ratio is equal.    Cardiovascular: There are no carotid bruits. Heart- Normal Rate with Regular rhythm and no murmurs. There are no gallops. There are no rubs. In the lower extremities there is no edema. The upper extremities do not have edema.    Impression and Assessment    Snoring.    Plan    Snoring Plan: A referral was made to sleep medicine.    Koffi was seen today for sleep apnea.    Diagnoses and all orders for this visit:    Snoring  -     Ambulatory Referral to Sleep Medicine        Return to Office    The patient was instructed to return for follow-up as needed.    The patient was instructed to return sooner if the condition changes, worsens, or doesn't resolve.

## 2018-02-19 ENCOUNTER — TELEPHONE (OUTPATIENT)
Dept: INTERNAL MEDICINE | Facility: CLINIC | Age: 58
End: 2018-02-19

## 2018-02-19 DIAGNOSIS — R04.0 EPISTAXIS: Primary | ICD-10-CM

## 2018-02-19 NOTE — TELEPHONE ENCOUNTER
----- Message from Gisella Bunch sent at 2/19/2018  2:22 PM EST -----  Patient called in and stated he had to cancel an appointment about his nose bleeds and he wasn't able to make it due to having knee surgery. He is wanting to get another referral to the doctor and see if it can be rescheduled if at all possible.    Patient call back: [phone]    Thanks!

## 2018-02-26 ENCOUNTER — TELEPHONE (OUTPATIENT)
Dept: INTERNAL MEDICINE | Facility: CLINIC | Age: 58
End: 2018-02-26

## 2018-02-26 RX ORDER — BUPROPION HYDROCHLORIDE 300 MG/1
300 TABLET ORAL DAILY
Qty: 90 TABLET | Refills: 1 | Status: SHIPPED | OUTPATIENT
Start: 2018-02-26 | End: 2018-09-05 | Stop reason: SDUPTHER

## 2018-02-26 NOTE — TELEPHONE ENCOUNTER
----- Message from Sheela Cardenas sent at 2/26/2018  2:29 PM EST -----  Contact: SELF  MALLORY ANNA CALLING FOR A REFILL FOR WELLBUTRIN. HE USES [facility]. HE CAN BE REACHED [phone]

## 2018-02-26 NOTE — TELEPHONE ENCOUNTER
Please call in 6 month supply (Either 1 month with RF 5 or 3 months with RF 1).  Daniel Salazar MD  3:39 PM  02/26/18

## 2018-03-05 ENCOUNTER — TELEPHONE (OUTPATIENT)
Dept: INTERNAL MEDICINE | Facility: CLINIC | Age: 58
End: 2018-03-05

## 2018-03-05 DIAGNOSIS — F41.9 ANXIETY: ICD-10-CM

## 2018-03-05 RX ORDER — FLUOXETINE 20 MG/1
20 TABLET, FILM COATED ORAL DAILY
Qty: 90 TABLET | Refills: 1 | Status: SHIPPED | OUTPATIENT
Start: 2018-03-05 | End: 2018-08-26 | Stop reason: SDUPTHER

## 2018-03-05 RX ORDER — FLUOXETINE 20 MG/1
20 TABLET, FILM COATED ORAL DAILY
Qty: 10 TABLET | Refills: 0 | Status: SHIPPED | OUTPATIENT
Start: 2018-03-05 | End: 2018-03-05 | Stop reason: SDUPTHER

## 2018-03-05 NOTE — TELEPHONE ENCOUNTER
----- Message from Sheela Cardenas sent at 3/5/2018  9:10 AM EST -----  Contact: SELF  MALLORY ANNA CALLING FOR A REFILL FOR FLUOXETINE. HE USES [facility]. HE CAN BE REACHED [phone]

## 2018-04-02 ENCOUNTER — HOSPITAL ENCOUNTER (OUTPATIENT)
Dept: CT IMAGING | Facility: HOSPITAL | Age: 58
Discharge: HOME | End: 2018-04-02
Attending: INTERNAL MEDICINE | Admitting: INTERNAL MEDICINE

## 2018-04-02 ENCOUNTER — APPOINTMENT (OUTPATIENT)
Dept: CT IMAGING | Facility: HOSPITAL | Age: 58
End: 2018-04-02
Attending: INTERNAL MEDICINE

## 2018-04-02 ENCOUNTER — OFFICE VISIT (OUTPATIENT)
Dept: INTERNAL MEDICINE | Facility: CLINIC | Age: 58
End: 2018-04-02

## 2018-04-02 VITALS
BODY MASS INDEX: 34.82 KG/M2 | TEMPERATURE: 97.8 F | HEART RATE: 72 BPM | SYSTOLIC BLOOD PRESSURE: 122 MMHG | DIASTOLIC BLOOD PRESSURE: 70 MMHG | RESPIRATION RATE: 18 BRPM | WEIGHT: 229 LBS

## 2018-04-02 DIAGNOSIS — R10.32 LEFT LOWER QUADRANT PAIN: ICD-10-CM

## 2018-04-02 DIAGNOSIS — R10.32 LEFT LOWER QUADRANT PAIN: Primary | ICD-10-CM

## 2018-04-02 LAB
ALBUMIN SERPL-MCNC: 4.2 G/DL (ref 3.2–4.8)
ALBUMIN/GLOB SERPL: 1.8 G/DL (ref 1.5–2.5)
ALP SERPL-CCNC: 68 U/L (ref 25–100)
ALT SERPL W P-5'-P-CCNC: 39 U/L (ref 7–40)
AMYLASE SERPL-CCNC: 65 U/L (ref 30–118)
ANION GAP SERPL CALCULATED.3IONS-SCNC: 7 MMOL/L (ref 3–11)
AST SERPL-CCNC: 30 U/L (ref 0–33)
BASOPHILS # BLD AUTO: 0.04 10*3/MM3 (ref 0–0.2)
BASOPHILS NFR BLD AUTO: 0.5 % (ref 0–1)
BILIRUB BLD-MCNC: ABNORMAL MG/DL
BILIRUB SERPL-MCNC: 0.6 MG/DL (ref 0.3–1.2)
BUN BLD-MCNC: 17 MG/DL (ref 9–23)
BUN/CREAT SERPL: 14.2 (ref 7–25)
CALCIUM SPEC-SCNC: 9.2 MG/DL (ref 8.7–10.4)
CHLORIDE SERPL-SCNC: 106 MMOL/L (ref 99–109)
CLARITY, POC: CLEAR
CO2 SERPL-SCNC: 28 MMOL/L (ref 20–31)
COLOR UR: YELLOW
CREAT BLD-MCNC: 1.2 MG/DL (ref 0.6–1.3)
CREAT BLDA-MCNC: 1.3 MG/DL (ref 0.6–1.3)
DEPRECATED RDW RBC AUTO: 40.9 FL (ref 37–54)
EOSINOPHIL # BLD AUTO: 0.29 10*3/MM3 (ref 0–0.3)
EOSINOPHIL NFR BLD AUTO: 3.5 % (ref 0–3)
ERYTHROCYTE [DISTWIDTH] IN BLOOD BY AUTOMATED COUNT: 12.5 % (ref 11.3–14.5)
EXPIRATION DATE: ABNORMAL
GFR SERPL CREATININE-BSD FRML MDRD: 63 ML/MIN/1.73
GLOBULIN UR ELPH-MCNC: 2.4 GM/DL
GLUCOSE BLD-MCNC: 100 MG/DL (ref 70–100)
GLUCOSE UR STRIP-MCNC: NEGATIVE MG/DL
HCT VFR BLD AUTO: 46.8 % (ref 38.9–50.9)
HGB BLD-MCNC: 15.5 G/DL (ref 13.1–17.5)
IMM GRANULOCYTES # BLD: 0.02 10*3/MM3 (ref 0–0.03)
IMM GRANULOCYTES NFR BLD: 0.2 % (ref 0–0.6)
KETONES UR QL: NEGATIVE
LEUKOCYTE EST, POC: NEGATIVE
LIPASE SERPL-CCNC: 28 U/L (ref 6–51)
LYMPHOCYTES # BLD AUTO: 1.51 10*3/MM3 (ref 0.6–4.8)
LYMPHOCYTES NFR BLD AUTO: 18.1 % (ref 24–44)
Lab: ABNORMAL
MCH RBC QN AUTO: 29.6 PG (ref 27–31)
MCHC RBC AUTO-ENTMCNC: 33.1 G/DL (ref 32–36)
MCV RBC AUTO: 89.5 FL (ref 80–99)
MONOCYTES # BLD AUTO: 0.66 10*3/MM3 (ref 0–1)
MONOCYTES NFR BLD AUTO: 7.9 % (ref 0–12)
NEUTROPHILS # BLD AUTO: 5.84 10*3/MM3 (ref 1.5–8.3)
NEUTROPHILS NFR BLD AUTO: 69.8 % (ref 41–71)
NITRITE UR-MCNC: NEGATIVE MG/ML
PH UR: 5 [PH] (ref 5–8)
PLATELET # BLD AUTO: 195 10*3/MM3 (ref 150–450)
PMV BLD AUTO: 10.1 FL (ref 6–12)
POTASSIUM BLD-SCNC: 4.1 MMOL/L (ref 3.5–5.5)
PROT SERPL-MCNC: 6.6 G/DL (ref 5.7–8.2)
PROT UR STRIP-MCNC: NEGATIVE MG/DL
RBC # BLD AUTO: 5.23 10*6/MM3 (ref 4.2–5.76)
RBC # UR STRIP: NEGATIVE /UL
SODIUM BLD-SCNC: 141 MMOL/L (ref 132–146)
SP GR UR: 1.02 (ref 1–1.03)
UROBILINOGEN UR QL: NORMAL
WBC NRBC COR # BLD: 8.36 10*3/MM3 (ref 3.5–10.8)

## 2018-04-02 PROCEDURE — 36415 COLL VENOUS BLD VENIPUNCTURE: CPT | Performed by: INTERNAL MEDICINE

## 2018-04-02 PROCEDURE — 25010000002 IOPAMIDOL 61 % SOLUTION: Performed by: INTERNAL MEDICINE

## 2018-04-02 PROCEDURE — 0 DIATRIZOATE MEGLUMINE & SODIUM PER 1 ML: Performed by: INTERNAL MEDICINE

## 2018-04-02 PROCEDURE — 80053 COMPREHEN METABOLIC PANEL: CPT | Performed by: INTERNAL MEDICINE

## 2018-04-02 PROCEDURE — 85025 COMPLETE CBC W/AUTO DIFF WBC: CPT | Performed by: INTERNAL MEDICINE

## 2018-04-02 PROCEDURE — 83690 ASSAY OF LIPASE: CPT | Performed by: INTERNAL MEDICINE

## 2018-04-02 PROCEDURE — 74177 CT ABD & PELVIS W/CONTRAST: CPT

## 2018-04-02 PROCEDURE — 81003 URINALYSIS AUTO W/O SCOPE: CPT | Performed by: INTERNAL MEDICINE

## 2018-04-02 PROCEDURE — 99214 OFFICE O/P EST MOD 30 MIN: CPT | Performed by: INTERNAL MEDICINE

## 2018-04-02 PROCEDURE — 82565 ASSAY OF CREATININE: CPT

## 2018-04-02 PROCEDURE — 82150 ASSAY OF AMYLASE: CPT | Performed by: INTERNAL MEDICINE

## 2018-04-02 RX ORDER — CIPROFLOXACIN 500 MG/1
500 TABLET, FILM COATED ORAL 2 TIMES DAILY
Qty: 20 TABLET | Refills: 0 | Status: SHIPPED | OUTPATIENT
Start: 2018-04-02 | End: 2018-04-12

## 2018-04-02 RX ADMIN — IOPAMIDOL 100 ML: 612 INJECTION, SOLUTION INTRAVENOUS at 11:48

## 2018-04-02 RX ADMIN — DIATRIZOATE MEGLUMINE AND DIATRIZOATE SODIUM 15 ML: 660; 100 LIQUID ORAL; RECTAL at 10:40

## 2018-04-02 NOTE — PROGRESS NOTES
Chief Complaint   Patient presents with   • RIGHT FLANK AND RLQ ABD PAIN     X 3-4 WEEKS       History of Present Illness    The patient presents for an acute visit for left lower quadrant abdominal pain. The patient reports a one week history of abdominal pain. The pain is worsening. The pain is in the LLQ. The pain radiates to the back. The pain is characterized as knife like, pressure, sharp, a squeezing sensation and stabbing. The pain is constant. The patient has not had a fever. He is having chills and sweats. The patient reports that motion aggravates the pain but that food, medication, hunger or alcohol does not worsen the symptoms.   The patient has noted no alleviating factors. The patient also denies hematuria, dysuria, nausea, vomiting, diarrhea, constipation, a rash, rectal bleeding, urinary hesitancy or urinary frequency. There has been no testicular pain.    Review of Systems    GENERAL- Reports: Chills and Sweats. Denies: Unexplained Weight Loss, Fever, Fatigue, Weakness or Malaise.    CARDIOVASCULAR- Denies Chest Pain, Edema, Syncope or Palpitations.    PULMONARY- Denies Wheezing, Dyspnea, Sputum Production, Cough, Hemoptysis or Pleuritic Chest Pain.    GASTROINTESTINAL- Reports: Abdominal Pain..    Medications      Current Outpatient Prescriptions:   •  aspirin 81 MG EC tablet, Take 81 mg by mouth daily., Disp: , Rfl:   •  atorvastatin (LIPITOR) 20 MG tablet, TAKE 1 TABLET DAILY, Disp: 90 tablet, Rfl: 3  •  buPROPion XL (WELLBUTRIN XL) 300 MG 24 hr tablet, Take 1 tablet by mouth Daily., Disp: 90 tablet, Rfl: 1  •  cyclobenzaprine (FLEXERIL) 10 MG tablet, Take 1 tablet by mouth 3 (Three) Times a Day As Needed for muscle spasms., Disp: 30 tablet, Rfl: 0  •  FLUoxetine (PROzac) 20 MG tablet, Take 1 tablet by mouth Daily., Disp: 90 tablet, Rfl: 1  •  naproxen (NAPROSYN) 500 MG tablet, Take 500 mg by mouth 2 (Two) Times a Day As Needed., Disp: , Rfl:      Allergies    Allergies   Allergen Reactions   •  Lortab [Hydrocodone-Acetaminophen] Itching       Problem List    Patient Active Problem List   Diagnosis   • Anxiety   • PFO (patent foramen ovale)   • History of CVA (cerebrovascular accident)   • Obesity   • Concentration deficit   • Microangiopathy     Past Medical History:   Diagnosis Date   • Anxiety    • CVA (cerebral vascular accident) 01/31/2013   • Depression     \   • Hyperlipidemia    • PFO (patent foramen ovale)      Past Surgical History:   Procedure Laterality Date   • ELBOW FUSION     • KNEE SURGERY Bilateral    • PATENT FORAMEN OVALE CLOSURE  2013       Medications, Allergies, Problems List and Past History were reviewed and updated.    Physical Examination    /70 (BP Location: Right arm, Patient Position: Sitting, Cuff Size: Adult)   Pulse 72   Temp 97.8 °F (36.6 °C) (Temporal Artery )   Resp 18   Wt 104 kg (229 lb)   BMI 34.82 kg/m²     HEENT: Head- Normocephalic Atraumatic. Facies- Within normal limits. Pinnas- Normal texture and shape bilaterally. Canals- Normal bilaterally. TMs- Normal bilaterally. Nares- Patent bilaterally. Nasal Septum- is normal. There is no tenderness to palpation over the frontal or maxillary sinuses. Lids- Normal bilaterally. Conjunctiva- Clear bilaterally. Sclera- Anicteric bilaterally. Oropharynx- Moist with no lesions. Tonsils- No enlargement, erythema or exudate.    Lungs: Auscultation- Clear to auscultation bilaterally. There are no retractions, clubbing or cyanosis. The Expiratory to Inspiratory ratio is equal.    Cardiovascular: There are no carotid bruits. Heart- Normal Rate with Regular rhythm and no murmurs. There are no gallops. There are no rubs. In the lower extremities there is no edema. The upper extremities do not have edema.    Abdomen: Noted to have tenderness but is not noted to have rigidity, a mass, a hernia, an enlarged liver, an enlarged spleen, an enlarged right kidney, an enlarged left kidney, pulsatile mass or scarring. The tenderness  is located in the left lower quadrant, periumbilical area and left pelvis. The tenderness is associated with rebound and guarding. There is no CVA tenderness noted.    Impression and Assessment    Left Lower Quadrant Abdominal Pain consistent with diverticulitis.    Plan    Left Lower Quadrant Abdominal Pain Plan: Further plans will be made after the tests are reviewed.    Koffi was seen today for right flank and rlq abd pain.    Diagnoses and all orders for this visit:    Left lower quadrant pain  -     Comprehensive Metabolic Panel  -     Amylase  -     Lipase  -     CBC & Differential  -     POC Urinalysis Dipstick, Automated  -     CT Abdomen Pelvis With Contrast; Future  -     CBC Auto Differential        Return to Office    The patient will follow-up following CT.    The patient was instructed to return sooner if the condition changes, worsens, or doesn't resolve.

## 2018-04-19 ENCOUNTER — OFFICE VISIT (OUTPATIENT)
Dept: INTERNAL MEDICINE | Facility: CLINIC | Age: 58
End: 2018-04-19

## 2018-04-19 VITALS
SYSTOLIC BLOOD PRESSURE: 120 MMHG | RESPIRATION RATE: 16 BRPM | HEIGHT: 68 IN | DIASTOLIC BLOOD PRESSURE: 72 MMHG | BODY MASS INDEX: 33.49 KG/M2 | WEIGHT: 221 LBS | HEART RATE: 70 BPM | OXYGEN SATURATION: 98 % | TEMPERATURE: 97.6 F

## 2018-04-19 DIAGNOSIS — R10.817 GENERALIZED ABDOMINAL TENDERNESS, REBOUND TENDERNESS PRESENCE NOT SPECIFIED: ICD-10-CM

## 2018-04-19 DIAGNOSIS — R10.32 LEFT LOWER QUADRANT PAIN: Primary | ICD-10-CM

## 2018-04-19 LAB
CLARITY, POC: CLEAR
COLOR UR: YELLOW
EXPIRATION DATE: NORMAL
GLUCOSE UR STRIP-MCNC: NEGATIVE MG/DL
KETONES UR QL: NEGATIVE
LEUKOCYTE EST, POC: NEGATIVE
Lab: NORMAL
NITRITE UR-MCNC: NEGATIVE MG/ML
PH UR: 5.5 [PH] (ref 5–8)
PROT UR STRIP-MCNC: NEGATIVE MG/DL
PROT/CREAT UR: 200 MG/G CREA
RBC # UR STRIP: NEGATIVE /UL
SP GR UR: 1.02 (ref 1–1.03)

## 2018-04-19 PROCEDURE — 87086 URINE CULTURE/COLONY COUNT: CPT | Performed by: PHYSICIAN ASSISTANT

## 2018-04-19 PROCEDURE — 81002 URINALYSIS NONAUTO W/O SCOPE: CPT | Performed by: PHYSICIAN ASSISTANT

## 2018-04-19 PROCEDURE — 99213 OFFICE O/P EST LOW 20 MIN: CPT | Performed by: PHYSICIAN ASSISTANT

## 2018-04-19 NOTE — PROGRESS NOTES
Chief Complaint   Patient presents with   • left lower quadrant pain     still painful, antibiotics pt doesn't feel like they helped at all.       Subjective       History of Present Illness     Koffi Melo is a 57 y.o. male with a history of diverticulitis presents for a two-week follow-up of left lower quadrant abdominal pain.  Patient states that he has had ongoing lower abdominal pain for several weeks.  He saw Dr. Salazar on 4/2/2018 for this issue and was started on Cipro ×10 days due to his history of diverticulitis.   Patient finished this prescription as directed and states that he has not seen any improvement with his left lower quadrant abdominal pain.  He states the pain has not worsened but is also not improved. CMP, CBC, urinalysis, lipase and amylase were essentially normal at last visit.  Patient also had a CT abdomen and pelvis on 4/2/2018 which showed no signs of diverticulitis or other acute disease process, unchanged from the CT of abdomen and pelvis on 9/15/2017.  Today patient states that he feels like the pain is in his abdomen as well as in his hip and surrounding musculature.  Patient states that the pain is constant although it does not wake him from sleep.  Pain is 7/10 on average although can become more intense with palpation in certain movements.  Pain feels like a sharp, stabbing pain with palpation and more of an aching, dull pain at rest.  Patient denies any changes in consistency or number of bowel movements daily; approximate 2-3 about minutes per day which are normal in appearance and consistency.  Patient denies diarrhea, constipation, nausea/vomiting, bloody stools or dark stools, difficulty urinating, dark urine or hematuria.  No fevers, chills, chest pain, shortness of breath.  Patient has not tried other treatments or therapies outside of the Cipro antibiotic. Does not take any meds or OTC products for pain.   Most recent colonoscopy on 5/12/2017 revealed no  evidence of diverticulitis,no polyps, two small ulcers at hepatic flexure and no additional findings.    The following portions of the patient's history were reviewed and updated as appropriate: allergies, current medications, past medical history, past social history, past surgical history and problem list.    Allergies   Allergen Reactions   • Lortab [Hydrocodone-Acetaminophen] Itching     Social History   Substance Use Topics   • Smoking status: Never Smoker   • Smokeless tobacco: Never Used   • Alcohol use No         Current Outpatient Prescriptions:   •  aspirin 81 MG EC tablet, Take 81 mg by mouth daily., Disp: , Rfl:   •  atorvastatin (LIPITOR) 20 MG tablet, TAKE 1 TABLET DAILY, Disp: 90 tablet, Rfl: 3  •  buPROPion XL (WELLBUTRIN XL) 300 MG 24 hr tablet, Take 1 tablet by mouth Daily., Disp: 90 tablet, Rfl: 1  •  cyclobenzaprine (FLEXERIL) 10 MG tablet, Take 1 tablet by mouth 3 (Three) Times a Day As Needed for muscle spasms., Disp: 30 tablet, Rfl: 0  •  FLUoxetine (PROzac) 20 MG tablet, Take 1 tablet by mouth Daily., Disp: 90 tablet, Rfl: 1  •  naproxen (NAPROSYN) 500 MG tablet, Take 500 mg by mouth 2 (Two) Times a Day As Needed., Disp: , Rfl:     Review of Systems   Constitutional: Negative for chills, diaphoresis, fatigue and fever.   HENT: Negative for congestion, sneezing and sore throat.    Respiratory: Negative for chest tightness, shortness of breath and wheezing.    Cardiovascular: Negative for chest pain, palpitations and leg swelling.   Gastrointestinal: Positive for abdominal pain. Negative for blood in stool, constipation, diarrhea, nausea and vomiting.   Genitourinary: Negative for difficulty urinating, dysuria, frequency, hematuria and urgency.   Musculoskeletal: Negative for back pain, joint swelling and myalgias.   Skin: Negative for rash and bruise.   Neurological: Negative for dizziness, syncope, weakness, light-headedness and headaches.       Objective   Vitals:    04/19/18 0829   BP:  120/72   Pulse: 70   Resp: 16   Temp: 97.6 °F (36.4 °C)   SpO2: 98%     Physical Exam   Constitutional: He appears well-developed and well-nourished.   HENT:   Head: Normocephalic and atraumatic.   Mouth/Throat: Oropharynx is clear and moist and mucous membranes are normal.   Neck: Normal range of motion. Neck supple. Carotid bruit is not present.   Cardiovascular: Normal rate, regular rhythm, normal heart sounds and intact distal pulses.    No murmur heard.  No peripheral edema.   Pulmonary/Chest: Effort normal and breath sounds normal. He exhibits no tenderness.   Abdominal: Soft. Bowel sounds are normal. He exhibits no distension and no mass. There is no hepatosplenomegaly. There is generalized tenderness (diffuse tenderness particularly to lower abdomen, LLQ > RRQ ). There is rebound (mild rebound tenderness, lower abdomen, LLQ > RRQ). There is no rigidity.   Psychiatric: He has a normal mood and affect.   Nursing note and vitals reviewed.            Assessment/Plan   Koffi was seen today for left lower quadrant pain.    Diagnoses and all orders for this visit:    Left lower quadrant pain  -     Urine Culture - Urine, Urine, Clean Catch  -     POC Urinalysis Dipstick, Multipro  -     Ambulatory Referral to Gastroenterology    Generalized abdominal tenderness, rebound tenderness presence not specified  -     Urine Culture - Urine, Urine, Clean Catch  -     POC Urinalysis Dipstick, Multipro  -     Ambulatory Referral to Gastroenterology      Patient continues to have abdominal pain and tenderness which may be more diffuse than at last visit, although LLQ still appears to be most intense pain and tenderness.  CT abdomen/pelvis and labs from 4/2/2018 essentially normal with no acute concerns. Most recent colonoscopy 5/12/2017 with no significant findings. Consulted with Dr. Salazar regarding this patient and we discussed referral to gastroenterology for further investigation. Patient was in agreement with  this plan.    Advised patient to begin daily OTC probiotic.        Return in about 4 weeks (around 5/17/2018) for Recheck.

## 2018-04-21 LAB — BACTERIA SPEC AEROBE CULT: NORMAL

## 2018-06-04 NOTE — PROGRESS NOTES
"Subjective:    Koffi Melo is a 57 y.o. male.     Chief Complaint   Patient presents with   • ER Follow up     fell out of hammock onto tail bone. Pt is still really sore, hard to straighten up and move around a whole lot.       History of Present Illness   Patient here for follow up after fall on 6/3/2018 when attempting to sit on hammock while at store. Reports he was flipped out and fell onto tailbone. Reports taken by ambulance to emergency room. X ray was negative. Reports he is still sore and pain is worsened with movement and prolonged sitting. States he was \"whipped or flipped down\". States Tramadol, ibuprofen and rest have given some relief. 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 Patient reports constant tailbone pain 7-8/0-10 scale and intermittent lower, posterior neck pain 2-3/0-10 scale. Patient inquires if physical therapy may help alleviate discomfort.    Current Outpatient Prescriptions:   •  atorvastatin (LIPITOR) 20 MG tablet, TAKE 1 TABLET DAILY, Disp: 90 tablet, Rfl: 3  •  buPROPion XL (WELLBUTRIN XL) 300 MG 24 hr tablet, Take 1 tablet by mouth Daily., Disp: 90 tablet, Rfl: 1  •  FLUoxetine (PROzac) 20 MG tablet, Take 1 tablet by mouth Daily., Disp: 90 tablet, Rfl: 1  •  naproxen (NAPROSYN) 500 MG tablet, Take 500 mg by mouth 2 (Two) Times a Day As Needed., Disp: , Rfl:   •  traMADol (ULTRAM) 50 MG tablet, , Disp: , Rfl:   •  HAVRIX 1440 EL U/ML vaccine, 1 (One) Time., Disp: , Rfl:      The following portions of the patient's history were reviewed and updated as appropriate: allergies, current medications, past family history, past medical history, past social history, past surgical history and problem list.    Review of Systems   Constitutional: Negative for chills, fatigue and fever.   HENT: Negative for congestion, dental problem, mouth sores, nosebleeds, postnasal drip, rhinorrhea, sinus pain, sinus " "pressure, sneezing and sore throat.    Eyes: Negative for pain, discharge, redness and itching.   Respiratory: Negative for cough, shortness of breath and wheezing.    Cardiovascular: Negative for chest pain, palpitations and leg swelling.   Gastrointestinal: Negative for abdominal distention, abdominal pain, blood in stool, diarrhea, nausea and vomiting.   Endocrine: Negative for cold intolerance, heat intolerance, polydipsia and polyuria.   Genitourinary: Negative for difficulty urinating, dysuria, frequency, hematuria and urgency.   Musculoskeletal: Positive for arthralgias, back pain, myalgias, neck pain and neck stiffness. Negative for gait problem and joint swelling.        Tailbone pain.   Skin: Negative for color change, rash and wound.   Allergic/Immunologic: Negative.    Neurological: Negative for dizziness, syncope, weakness, light-headedness, numbness and headaches.   Hematological: Negative for adenopathy. Does not bruise/bleed easily.   Psychiatric/Behavioral: Negative.        Objective:    /78   Pulse 76   Temp 97.9 °F (36.6 °C) (Temporal Artery )   Resp 16   Ht 172.7 cm (68\")   Wt 98.9 kg (218 lb)   SpO2 99%   BMI 33.15 kg/m²     Physical Exam   Constitutional: He appears well-developed and well-nourished.   HENT:   Head: Normocephalic and atraumatic.   Right Ear: Hearing and external ear normal.   Left Ear: Hearing and external ear normal.   Nose: Nose normal.   Mouth/Throat: Oropharynx is clear and moist.   Eyes: Lids are normal.   Neck: Normal range of motion. Neck supple.   There is no tenderness to palpation of the cervical spine or paraspinal muscles. Cervical spine with muscle spasm. Grimaces when turning neck to left.   Cardiovascular: Normal rate, regular rhythm and normal heart sounds.    No murmur heard.  Pulmonary/Chest: Effort normal and breath sounds normal.   Abdominal: Soft. Bowel sounds are normal. He exhibits no distension and no mass. There is no hepatosplenomegaly. " There is no tenderness.   No CVA tenderness.   Musculoskeletal: Normal range of motion.        Lumbar back: He exhibits spasm.   There is no tenderness to palpation over the lumbar or thoracic spine or paraspinal muscles.  Grimacing when bending and raising from sitting to standing position.   Neurological: He has normal strength and normal reflexes.   Skin: Skin is warm, dry and intact. No rash noted.   No bruising observed over spine/coccyx area.   Psychiatric: He has a normal mood and affect.   Nursing note and vitals reviewed.      Assessment/Plan:    Koffi was seen today for er follow up.    Diagnoses and all orders for this visit:    Coccyx pain  -     Ambulatory Referral to Physical Therapy Evaluate and treat    Continue medicines prescribed in emergency room and ibuprofen as needed.    Return if symptoms worsen or fail to improve, follow up on Monday if no improvement.

## 2018-06-05 ENCOUNTER — OFFICE VISIT (OUTPATIENT)
Dept: INTERNAL MEDICINE | Facility: CLINIC | Age: 58
End: 2018-06-05

## 2018-06-05 VITALS
HEIGHT: 68 IN | HEART RATE: 76 BPM | TEMPERATURE: 97.9 F | DIASTOLIC BLOOD PRESSURE: 78 MMHG | BODY MASS INDEX: 33.04 KG/M2 | OXYGEN SATURATION: 99 % | RESPIRATION RATE: 16 BRPM | WEIGHT: 218 LBS | SYSTOLIC BLOOD PRESSURE: 120 MMHG

## 2018-06-05 DIAGNOSIS — M54.2 NECK PAIN: ICD-10-CM

## 2018-06-05 DIAGNOSIS — M53.3 COCCYX PAIN: Primary | ICD-10-CM

## 2018-06-05 PROCEDURE — 99213 OFFICE O/P EST LOW 20 MIN: CPT | Performed by: NURSE PRACTITIONER

## 2018-06-05 RX ORDER — HEPATITIS A VACCINE 1440 [IU]/ML
INJECTION, SUSPENSION INTRAMUSCULAR ONCE
COMMUNITY
Start: 2018-06-01 | End: 2018-07-24

## 2018-06-05 RX ORDER — TRAMADOL HYDROCHLORIDE 50 MG/1
TABLET ORAL
COMMUNITY
Start: 2018-06-04 | End: 2018-11-19

## 2018-07-24 ENCOUNTER — OFFICE VISIT (OUTPATIENT)
Dept: INTERNAL MEDICINE | Facility: CLINIC | Age: 58
End: 2018-07-24

## 2018-07-24 VITALS
SYSTOLIC BLOOD PRESSURE: 138 MMHG | DIASTOLIC BLOOD PRESSURE: 84 MMHG | HEART RATE: 76 BPM | RESPIRATION RATE: 16 BRPM | BODY MASS INDEX: 34.67 KG/M2 | TEMPERATURE: 97.4 F | WEIGHT: 228 LBS

## 2018-07-24 DIAGNOSIS — T14.8XXA PUNCTURE WOUND: Primary | ICD-10-CM

## 2018-07-24 DIAGNOSIS — S56.911A FOREARM STRAIN, RIGHT, INITIAL ENCOUNTER: Primary | ICD-10-CM

## 2018-07-24 DIAGNOSIS — T14.8XXA PUNCTURE WOUND: ICD-10-CM

## 2018-07-24 PROCEDURE — 99213 OFFICE O/P EST LOW 20 MIN: CPT | Performed by: INTERNAL MEDICINE

## 2018-07-24 PROCEDURE — 90471 IMMUNIZATION ADMIN: CPT | Performed by: INTERNAL MEDICINE

## 2018-07-24 PROCEDURE — 90715 TDAP VACCINE 7 YRS/> IM: CPT | Performed by: INTERNAL MEDICINE

## 2018-07-25 NOTE — PROGRESS NOTES
Chief Complaint   Patient presents with   • RIGHT ARM PAIN     X 1 MONTH    • ABRASION ON LEFT LEG       History of Present Illness      The patient presents for an initial evaluation of pain of the right arm. The pain is described as aching and moderate. The symptoms have been present for 1 month. There has not been recent trauma to the affected area. The symptoms are constant. The pain began following doing some heavy lifting.       There is no pain in the spine. There has been no trauma to the neck or back. The patient denies loss of bowel or bladder control. There is no weakness reported in the affected extremity.       The patient does not report foreign travel. There are no insect bites reported. The patient denies new sexual exposures. There are no high risk exposures. There have not been recent viral infections.       The patient does not report a dry cough, a wet cough, wheezing, fever, facial pain, a headache, eye drainage, ear pain, ear drainage, nausea, vomiting, diarrhea, myalgias, sore throat, abdominal pain, nasal discharge, weight loss, decreased appetite, chills, rash, joint pain, a breast lump or GI bleeding.    Review of Systems    GENERAL/CONSTITUTIONAL- Denies Sweats, Fatigue, Weakness or Malaise.    CARDIOVASCULAR- Denies Chest Pain, Claudication, Edema, Syncope or Palpitations.    MUSCULOSKELETAL- Denies Joint Stiffness, Decreased Range of Motion, Joint Swelling or Erythema of Joints.    Medications      Current Outpatient Prescriptions:   •  atorvastatin (LIPITOR) 20 MG tablet, TAKE 1 TABLET DAILY, Disp: 90 tablet, Rfl: 3  •  buPROPion XL (WELLBUTRIN XL) 300 MG 24 hr tablet, Take 1 tablet by mouth Daily., Disp: 90 tablet, Rfl: 1  •  FLUoxetine (PROzac) 20 MG tablet, Take 1 tablet by mouth Daily., Disp: 90 tablet, Rfl: 1  •  naproxen (NAPROSYN) 500 MG tablet, Take 500 mg by mouth 2 (Two) Times a Day As Needed., Disp: , Rfl:   •  traMADol (ULTRAM) 50 MG tablet, , Disp: , Rfl:       Allergies    Allergies   Allergen Reactions   • Lortab [Hydrocodone-Acetaminophen] Itching       Problem List    Patient Active Problem List   Diagnosis   • Anxiety   • PFO (patent foramen ovale)   • History of CVA (cerebrovascular accident)   • Obesity   • Concentration deficit   • Microangiopathy (CMS/HCC)       Medications, Allergies, Problems List and Past History were reviewed and updated.    Physical Examination    /84 (BP Location: Left arm, Patient Position: Sitting, Cuff Size: Adult)   Pulse 76   Temp 97.4 °F (36.3 °C) (Temporal Artery )   Resp 16   Wt 103 kg (228 lb)   BMI 34.67 kg/m²     Neck: ROM- Normal Range of Motion with no rigidity.    Upper Extremity Neuro Exam: Muscle Strength is 5/5 in all major upper extremity muscle groups. Deep Tendon Reflexes are 2+ and equal in the biceps, triceps and brachioradialis distributions bilaterally. Sensation is normal in all distributions.    Arm Exam: The right arm has tenderness of the mid forearm. The left arm has no anatomic abnormalities or tenderness.    Impression and Assessment    Right Forearm Strain.    Plan    Right Forearm Strain Plan: Likely Brachioradialis Tendonitis. Will ask PT to evaluate and treat.    Koffi was seen today for right arm pain and abrasion on left leg.    Diagnoses and all orders for this visit:    Forearm strain, right, initial encounter  -     Ambulatory Referral to Physical Therapy Evaluate and treat    Puncture wound  -     Tdap Vaccine Greater Than or Equal To 6yo IM        Return to Office    The patient was instructed to return for follow-up at the next scheduled visit.    The patient was instructed to return sooner if the condition changes, worsens, or doesn't resolve.

## 2018-08-26 DIAGNOSIS — F41.9 ANXIETY: ICD-10-CM

## 2018-08-27 RX ORDER — FLUOXETINE 20 MG/1
TABLET, FILM COATED ORAL
Qty: 90 TABLET | Refills: 1 | Status: SHIPPED | OUTPATIENT
Start: 2018-08-27 | End: 2019-02-23 | Stop reason: SDUPTHER

## 2018-09-05 RX ORDER — BUPROPION HYDROCHLORIDE 300 MG/1
300 TABLET ORAL DAILY
Qty: 15 TABLET | Refills: 0 | Status: SHIPPED | OUTPATIENT
Start: 2018-09-05 | End: 2019-02-25 | Stop reason: SDUPTHER

## 2018-09-05 RX ORDER — BUPROPION HYDROCHLORIDE 300 MG/1
TABLET ORAL
Qty: 90 TABLET | Refills: 1 | Status: SHIPPED | OUTPATIENT
Start: 2018-09-05 | End: 2018-09-05 | Stop reason: SDUPTHER

## 2018-09-05 NOTE — TELEPHONE ENCOUNTER
----- Message from Casimiro Workman sent at 9/5/2018  9:51 AM EDT -----  PT NEEDS  REFILL FOR TWO WEEKS ON WELBUTRION IF POSSIBLE TILL [facility] CALLS IN FOR A 90 DAY SUPPLY CALL BACK [phone]

## 2018-09-05 NOTE — TELEPHONE ENCOUNTER
2 WEEK SUPPLY SENT TO [facility] PER REQUEST.    LMVM FOR PT TO PLEASE RETURN CALL.  [facility] # [id number].    JUST NEED TO INFORM THAT 2 WEEK RX SENT TO [facility] PER REQUEST.

## 2018-11-19 ENCOUNTER — OFFICE VISIT (OUTPATIENT)
Dept: INTERNAL MEDICINE | Facility: CLINIC | Age: 58
End: 2018-11-19

## 2018-11-19 VITALS
SYSTOLIC BLOOD PRESSURE: 148 MMHG | HEART RATE: 84 BPM | WEIGHT: 225 LBS | TEMPERATURE: 97.3 F | RESPIRATION RATE: 18 BRPM | BODY MASS INDEX: 34.21 KG/M2 | DIASTOLIC BLOOD PRESSURE: 84 MMHG

## 2018-11-19 DIAGNOSIS — M79.18 GLUTEAL PAIN: Primary | ICD-10-CM

## 2018-11-19 PROCEDURE — 99214 OFFICE O/P EST MOD 30 MIN: CPT | Performed by: INTERNAL MEDICINE

## 2018-11-19 RX ORDER — DICYCLOMINE HCL 20 MG
20 TABLET ORAL EVERY 6 HOURS PRN
COMMUNITY
End: 2018-12-29

## 2018-11-19 RX ORDER — METHYLPREDNISOLONE 4 MG/1
TABLET ORAL
Qty: 21 TABLET | Refills: 0 | OUTPATIENT
Start: 2018-11-19 | End: 2018-12-11

## 2018-11-19 RX ORDER — OXYCODONE AND ACETAMINOPHEN 7.5; 325 MG/1; MG/1
1 TABLET ORAL EVERY 4 HOURS PRN
COMMUNITY
End: 2018-12-20

## 2018-11-25 NOTE — PROGRESS NOTES
Chief Complaint   Patient presents with   • RIGHT BUTTOCK PAIN       History of Present Illness    The patient presents with an acute problem consisting of right posterior gluteal pain. The symptoms have been present for one week. He denies trauma. The pain is in the posterior gluteal area and is sharp and sometimes burning. It doesn't radiate. He has no leg weakness or numbness. The patient does not report night sweats, abdominal pain, a rash, bone pain, joint pain, chills, myalgias or fever. He also reports that he has had no chest pain, dyspnea, edema, syncope, blurred vision or palpitations.    Review of Systems    CONSTITUTIONAL- Denies Unexplained Weight Loss, Fever, Sweats, Fatigue, Weakness or Malaise.    NEUROLOGIC- Denies Seizures, Confusion, Memory Loss, Depression or Excessive Daytime Sleepiness.    MUSCULOSKELETAL- Denies Joint Stiffness, Decreased Range of Motion, Joint Swelling or Erythema of Joints.    Medications      Current Outpatient Medications:   •  atorvastatin (LIPITOR) 20 MG tablet, TAKE 1 TABLET DAILY, Disp: 90 tablet, Rfl: 3  •  buPROPion XL (WELLBUTRIN XL) 300 MG 24 hr tablet, Take 1 tablet by mouth Daily., Disp: 15 tablet, Rfl: 0  •  Diclofenac Sodium (PENNSAID) 2 % solution, Place 2 Pump on the skin as directed by provider Every 6 (Six) Hours As Needed., Disp: , Rfl:   •  dicyclomine (BENTYL) 20 MG tablet, Take 20 mg by mouth Every 6 (Six) Hours As Needed., Disp: , Rfl:   •  FLUoxetine (PROzac) 20 MG tablet, TAKE 1 TABLET DAILY, Disp: 90 tablet, Rfl: 1  •  naproxen (NAPROSYN) 500 MG tablet, Take 500 mg by mouth 2 (Two) Times a Day As Needed., Disp: , Rfl:   •  oxyCODONE-acetaminophen (PERCOCET) 7.5-325 MG per tablet, Take 1 tablet by mouth Every 4 (Four) Hours As Needed., Disp: , Rfl:      Allergies    Allergies   Allergen Reactions   • Lortab [Hydrocodone-Acetaminophen] Itching       Problem List    Patient Active Problem List   Diagnosis   • Anxiety   • PFO (patent foramen ovale)   •  History of CVA (cerebrovascular accident)   • Obesity   • Concentration deficit   • Microangiopathy (CMS/HCC)       Medications, Allergies, Problems List and Past History were reviewed and updated.    Physical Examination    /84 (BP Location: Left arm, Patient Position: Sitting, Cuff Size: Adult)   Pulse 84   Temp 97.3 °F (36.3 °C) (Temporal)   Resp 18   Wt 102 kg (225 lb)   BMI 34.21 kg/m²     Hips: The hips are symmetric with normal lorene landmarks, equal iliac crest height, and no noted atrophy. Hip flexion is normal bilaterally. Hip Extension is normal bilaterally. Hip abduction is normal bilaterally. There is no pain with forced abduction. Hip adduction is normal bilaterally. Internal rotation is normal bilaterally. External rotation is normal bilaterally. There is no pain with straight leg raising on either side. He has right posterior hip pain to deep palpation.    Impression and Assessment    Right Gluteal Pain.      Plan    Gluteal Pain Plan: He was referred to physical therapy. Medication will be added as noted.    Koffi was seen today for right buttock pain.    Diagnoses and all orders for this visit:    Gluteal pain  -     MethylPREDNISolone (MEDROL, DIAMOND,) 4 MG tablet; Take as directed on package instructions.  -     Ambulatory Referral to Physical Therapy Evaluate and treat        Return to Office    The patient was instructed to return for follow-up as needed.    The patient was instructed to return sooner if the condition changes, worsens, or doesn't resolve.

## 2018-12-10 ENCOUNTER — TELEPHONE (OUTPATIENT)
Dept: INTERNAL MEDICINE | Facility: CLINIC | Age: 58
End: 2018-12-10

## 2018-12-10 RX ORDER — DIAZEPAM 5 MG/1
5 TABLET ORAL NIGHTLY PRN
Qty: 6 TABLET | Refills: 0 | Status: SHIPPED | OUTPATIENT
Start: 2018-12-10 | End: 2018-12-29

## 2018-12-10 RX ORDER — DIAZEPAM 5 MG/1
5 TABLET ORAL NIGHTLY PRN
Qty: 6 TABLET | Refills: 0 | Status: SHIPPED | OUTPATIENT
Start: 2018-12-10 | End: 2018-12-10 | Stop reason: SDUPTHER

## 2018-12-10 NOTE — TELEPHONE ENCOUNTER
PATIENT STATED THAT HE JUST LEFT PT AND SAID THAT IT HELPED A LITTLE BIT, BUT NOT A WHOLE LOT. STATED HE DOESN'T WANT PAIN MEDICATION OR ANYTHING BECAUSE IT DIDN'T HELP; HE JUST NEEDS SOMETHING TO HELP HIM SLEEP, BECAUSE HE HASN'T SLEPT IN 2 DAYS.

## 2018-12-10 NOTE — TELEPHONE ENCOUNTER
I have sent in a prescription for valium to take at bedtime.  Sounds like he's going to need more imaging.  Please schedule an appt with me this week.   Daniel Salazar MD  11:54 AM  12/10/18

## 2018-12-10 NOTE — TELEPHONE ENCOUNTER
S/W PT, INFORMED THAT DR MILLIGAN SENT IN RX FOR VALIUM FOR HIM TO TAKE TONIGHT AT BEDTIME TO TRY TO GET SOME REST AND THAT HE WANTS TO SEE HIM AGAIN FOR RE-EVALUATION.  APPT SCHEDULED FOR TOMORROW 12/11/18 AT 3PM WITH DR MILLIGAN.  DARREN MALDONADO APPREC.

## 2018-12-10 NOTE — TELEPHONE ENCOUNTER
S/W PT WIFE,  PT IS HAVING EXCRUTIATING PAIN STILL.  STATES IT IS IN HIS RIGHT LOWER BACK, HIP AND WRAPS AROUND.  STATES HIS LEG IS NOT NUMB OR TINGLING. NO LOSS IN B/B.   STATES HE HAS TRIED EVERYTHING THEY KNOW TO DO AND NO RELIEF.  STATES PAIN MEDS HAVE NOT HELPED AT ALL AND THEY ALSO TRIED ALEVE.  STATES HE COMPLETED THE MEDROL DOSE DIAMOND AND THEN SHE STARTED HIM ON PREDNISONE 20 MG DAILY X 5 DAYS ON Saturday.   HAS TRIED MUSCLE RELAXERS, HEAT, ICE AND TOPICAL PAIN CREAM.  STATES HE HAS ALSO BEEN DOING PHYSICAL THERAPY AS ORDERED AND IN FACT HAS APPT AT 10 AM THIS MORNING BUT DOESN'T KNOW IF SHOULD GO OR NOT.  STATES HE CANNOT GET IN POSITION TO RELIEVE THE PAIN AT ALL AND HAS BEEN UP ALL WEEKEND.

## 2018-12-11 ENCOUNTER — APPOINTMENT (OUTPATIENT)
Dept: CT IMAGING | Facility: HOSPITAL | Age: 58
End: 2018-12-11

## 2018-12-11 ENCOUNTER — HOSPITAL ENCOUNTER (EMERGENCY)
Facility: HOSPITAL | Age: 58
Discharge: HOME | End: 2018-12-11
Attending: EMERGENCY MEDICINE | Admitting: EMERGENCY MEDICINE

## 2018-12-11 VITALS
BODY MASS INDEX: 33.04 KG/M2 | WEIGHT: 218 LBS | TEMPERATURE: 98.4 F | HEIGHT: 68 IN | OXYGEN SATURATION: 94 % | RESPIRATION RATE: 18 BRPM | HEART RATE: 99 BPM | SYSTOLIC BLOOD PRESSURE: 126 MMHG | DIASTOLIC BLOOD PRESSURE: 75 MMHG

## 2018-12-11 DIAGNOSIS — M54.42 ACUTE LEFT-SIDED BACK PAIN WITH SCIATICA: Primary | ICD-10-CM

## 2018-12-11 PROCEDURE — 25010000002 HYDROMORPHONE PER 4 MG: Performed by: EMERGENCY MEDICINE

## 2018-12-11 PROCEDURE — 72131 CT LUMBAR SPINE W/O DYE: CPT

## 2018-12-11 PROCEDURE — 25010000002 ONDANSETRON PER 1 MG: Performed by: EMERGENCY MEDICINE

## 2018-12-11 PROCEDURE — 25010000002 DEXAMETHASONE: Performed by: EMERGENCY MEDICINE

## 2018-12-11 PROCEDURE — 96375 TX/PRO/DX INJ NEW DRUG ADDON: CPT

## 2018-12-11 PROCEDURE — 99283 EMERGENCY DEPT VISIT LOW MDM: CPT

## 2018-12-11 PROCEDURE — 96374 THER/PROPH/DIAG INJ IV PUSH: CPT

## 2018-12-11 PROCEDURE — 25010000002 KETOROLAC TROMETHAMINE PER 15 MG: Performed by: EMERGENCY MEDICINE

## 2018-12-11 RX ORDER — ONDANSETRON 2 MG/ML
4 INJECTION INTRAMUSCULAR; INTRAVENOUS ONCE
Status: COMPLETED | OUTPATIENT
Start: 2018-12-11 | End: 2018-12-11

## 2018-12-11 RX ORDER — HYDROMORPHONE HYDROCHLORIDE 1 MG/ML
0.5 INJECTION, SOLUTION INTRAMUSCULAR; INTRAVENOUS; SUBCUTANEOUS ONCE
Status: COMPLETED | OUTPATIENT
Start: 2018-12-11 | End: 2018-12-11

## 2018-12-11 RX ORDER — SODIUM CHLORIDE 0.9 % (FLUSH) 0.9 %
10 SYRINGE (ML) INJECTION AS NEEDED
Status: DISCONTINUED | OUTPATIENT
Start: 2018-12-11 | End: 2018-12-11 | Stop reason: HOSPADM

## 2018-12-11 RX ORDER — KETOROLAC TROMETHAMINE 30 MG/ML
30 INJECTION, SOLUTION INTRAMUSCULAR; INTRAVENOUS ONCE
Status: COMPLETED | OUTPATIENT
Start: 2018-12-11 | End: 2018-12-11

## 2018-12-11 RX ORDER — PREDNISONE 20 MG/1
TABLET ORAL
Qty: 18 TABLET | Refills: 0 | Status: SHIPPED | OUTPATIENT
Start: 2018-12-11 | End: 2018-12-20

## 2018-12-11 RX ADMIN — ONDANSETRON 4 MG: 2 INJECTION INTRAMUSCULAR; INTRAVENOUS at 10:53

## 2018-12-11 RX ADMIN — KETOROLAC TROMETHAMINE 30 MG: 30 INJECTION, SOLUTION INTRAMUSCULAR at 10:55

## 2018-12-11 RX ADMIN — HYDROMORPHONE HYDROCHLORIDE 0.5 MG: 1 INJECTION, SOLUTION INTRAMUSCULAR; INTRAVENOUS; SUBCUTANEOUS at 10:57

## 2018-12-11 RX ADMIN — DEXAMETHASONE SODIUM PHOSPHATE 10 MG: 10 INJECTION INTRAMUSCULAR; INTRAVENOUS at 10:59

## 2018-12-11 NOTE — ED PROVIDER NOTES
Subjective   Patient presents to the emergency Department today complaining of severe back pain.  Patient reports he had a similar episode about a month ago was seen by his primary care doctor Dr. Zapata.  At that time he was given some steroids states he didn't really help his given some muscle relaxers did not get much relief.  Said no and numbness or tingling of the groin or perianal area.  He's had no loss of bowel or bladder control.  Pain does radiate down his leg or down to about the calf area.  Pain is made worse with sitting better with standing or lying down.  Patient states that he's had no fevers no chills no dysuria frequency urgency or hematuria associated with this.  Hematemesis.  Pain is definitely worse with bending twisting or sitting.  Made better with the lying down.        History provided by:  Patient   used: No    Back Pain   Location:  Lumbar spine  Quality:  Burning and shooting  Radiates to:  L posterior upper leg, R thigh and L foot  Pain severity:  Severe  Pain is:  Same all the time  Onset quality:  Gradual  Duration:  1 week  Timing:  Constant  Progression:  Waxing and waning  Chronicity:  Recurrent  Context: physical stress and twisting    Context: not emotional stress, not MCA, not MVA and not occupational injury    Relieved by:  Bed rest and being still  Worsened by:  Sitting, sneezing, movement, bending and twisting  Ineffective treatments:  None tried  Associated symptoms: leg pain    Associated symptoms: no abdominal pain, no bladder incontinence, no bowel incontinence, no chest pain, no dysuria, no fever, no headaches, no paresthesias, no perianal numbness, no tingling, no weakness and no weight loss    Risk factors: no hx of cancer, no hx of osteoporosis, no recent surgery and no steroid use        Review of Systems   Constitutional: Negative for chills, fever and weight loss.   Respiratory: Negative for chest tightness, shortness of breath and wheezing.     Cardiovascular: Negative for chest pain.   Gastrointestinal: Negative for abdominal pain, bowel incontinence, diarrhea, nausea and vomiting.   Genitourinary: Negative for bladder incontinence, dysuria, flank pain, frequency, penile pain and urgency.   Musculoskeletal: Positive for back pain.   Skin: Negative for pallor and rash.   Neurological: Negative for tingling, weakness, headaches and paresthesias.   Hematological: Negative for adenopathy.   Psychiatric/Behavioral: Negative.    All other systems reviewed and are negative.      Past Medical History:   Diagnosis Date   • Anxiety    • CVA (cerebral vascular accident) (CMS/[facility]) 01/31/2013   • Depression     \   • Hyperlipidemia    • PFO (patent foramen ovale)        Allergies   Allergen Reactions   • Lortab [Hydrocodone-Acetaminophen] Itching       Past Surgical History:   Procedure Laterality Date   • ELBOW FUSION     • KNEE SURGERY Bilateral    • PATENT FORAMEN OVALE CLOSURE  2013       Family History   Problem Relation Age of Onset   • COPD Mother    • Cancer Father    • Alcohol abuse Father    • Cancer Maternal Grandmother    • Stroke Maternal Grandmother    • Hypertension Maternal Grandfather        Social History     Socioeconomic History   • Marital status:      Spouse name: Not on file   • Number of children: Not on file   • Years of education: Not on file   • Highest education level: Not on file   Tobacco Use   • Smoking status: Never Smoker   • Smokeless tobacco: Never Used   Substance and Sexual Activity   • Alcohol use: No   • Drug use: No   • Sexual activity: Yes     Partners: Female     Comment:             Objective   Physical Exam   Constitutional: He is oriented to person, place, and time. He appears well-developed and well-nourished.   HENT:   Head: Normocephalic and atraumatic.   Right Ear: External ear normal.   Left Ear: External ear normal.   Nose: Nose normal.   Mouth/Throat: Oropharynx is clear and moist.   Eyes:  Conjunctivae and EOM are normal. Pupils are equal, round, and reactive to light. No scleral icterus.   Neck: Normal range of motion. No thyromegaly present.   Cardiovascular: Normal rate, regular rhythm and normal heart sounds.   Pulmonary/Chest: Effort normal and breath sounds normal. No respiratory distress. He has no wheezes. He has no rales. He exhibits no tenderness.   Abdominal: Soft. Bowel sounds are normal. He exhibits no distension. There is no tenderness.   Musculoskeletal: Normal range of motion.   Tender in the left SI.  There is no rash no lesions.  He is positive straight appears in the left negative on the right.  Strength 5 over 5 bilateral lower extremities.  Deep tendon reflexes +! knees +1 the ankles.   Lymphadenopathy:     He has no cervical adenopathy.   Neurological: He is alert and oriented to person, place, and time. He has normal reflexes. He displays normal reflexes. No cranial nerve deficit. Coordination normal.   Skin: Skin is warm and dry. Capillary refill takes less than 2 seconds.   Psychiatric: He has a normal mood and affect. His behavior is normal. Judgment and thought content normal.   Nursing note and vitals reviewed.      Procedures           ED Course  ED Course as of Dec 13 0728   Tue Dec 11, 2018   1236 CT revealed no acute findings.  Mild disc bulge but no high-grade herniation.  Bilateral pars defect nothing acute.  [MARYCARMEN]      ED Course User Index  [MARYCARMEN] Jack Keene PA        No results found for this or any previous visit (from the past 24 hour(s)).  Note: In addition to lab results from this visit, the labs listed above may include labs taken at another facility or during a different encounter within the last 24 hours. Please correlate lab times with ED admission and discharge times for further clarification of the services performed during this visit.    CT Lumbar Spine Without Contrast   Final Result   1. Bilateral pars defects at L5, with very mild L5 anterior  "subluxation.   2. Mild multilevel disc bulges with no evidence of high-grade canal   stenosis.       D:  12/11/2018   E:  12/11/2018       This report was finalized on 12/11/2018 9:57 PM by DR. Tamir Gonzalez MD.            Vitals:    12/11/18 0835 12/11/18 0841 12/11/18 1200   BP: 143/81  126/75   Pulse: 99     Resp:  18    Temp: 98.4 °F (36.9 °C)     SpO2: 96%  94%   Weight:  98.9 kg (218 lb)    Height:  172.7 cm (68\")      Medications   ketorolac (TORADOL) injection 30 mg (30 mg Intravenous Given 12/11/18 1055)   HYDROmorphone (DILAUDID) injection 0.5 mg (0.5 mg Intravenous Given 12/11/18 1057)   ondansetron (ZOFRAN) injection 4 mg (4 mg Intravenous Given 12/11/18 1053)   dexamethasone (DECADRON) IVPB 10 mg (0 mg Intravenous Stopped 12/11/18 1250)     ECG/EMG Results (last 24 hours)     ** No results found for the last 24 hours. **                  MDM  Number of Diagnoses or Management Options  Acute left-sided back pain with sciatica: new and requires workup     Amount and/or Complexity of Data Reviewed  Tests in the radiology section of CPT®: reviewed and ordered  Discuss the patient with other providers: yes    Patient Progress  Patient progress: stable        Final diagnoses:   Acute left-sided back pain with sciatica            Jack Keene PA  12/13/18 0728    "

## 2018-12-17 ENCOUNTER — TELEPHONE (OUTPATIENT)
Dept: INTERNAL MEDICINE | Facility: CLINIC | Age: 58
End: 2018-12-17

## 2018-12-17 DIAGNOSIS — I73.9 MICROANGIOPATHY (HCC): ICD-10-CM

## 2018-12-17 RX ORDER — ATORVASTATIN CALCIUM 20 MG/1
TABLET, FILM COATED ORAL
Qty: 90 TABLET | Refills: 3 | OUTPATIENT
Start: 2018-12-17

## 2018-12-18 NOTE — TELEPHONE ENCOUNTER
----- Message from Sheela Cardenas sent at 12/17/2018 12:12 PM EST -----  Contact: SELF  MALLORY ANNA WAS SENT TO THE HOSPITAL AND HAD A CT SCAN DONE THAT SHOWED A BIRTH DEFECT EFFECTING HIS VERTEBRAE AND A DISC. HE WANTS TO KNOW IF YOU WOULD ORDER AN MRI TO MAKE SURE THAT IS ALL THAT IS GOING ON. HE WOULD LIKE TO GET THIS DONE AS SOON AS POSSIBLE. HE IS SCHEDULED TO SEE A SURGEON ON 12/29/18.  MALLORY CAN BE REACHED [phone]

## 2018-12-18 NOTE — TELEPHONE ENCOUNTER
Schedule an appt to follow-up.  I have to have a note in the chart explaining the medical necessity of an MRI.  Daniel Salazar MD  8:53 PM  12/17/18

## 2018-12-20 ENCOUNTER — TELEPHONE (OUTPATIENT)
Dept: INTERNAL MEDICINE | Facility: CLINIC | Age: 58
End: 2018-12-20

## 2018-12-20 ENCOUNTER — OFFICE VISIT (OUTPATIENT)
Dept: INTERNAL MEDICINE | Facility: CLINIC | Age: 58
End: 2018-12-20

## 2018-12-20 VITALS
WEIGHT: 221 LBS | DIASTOLIC BLOOD PRESSURE: 82 MMHG | TEMPERATURE: 98.7 F | RESPIRATION RATE: 18 BRPM | BODY MASS INDEX: 33.6 KG/M2 | HEART RATE: 64 BPM | SYSTOLIC BLOOD PRESSURE: 138 MMHG

## 2018-12-20 DIAGNOSIS — M54.41 ACUTE RIGHT-SIDED LOW BACK PAIN WITH RIGHT-SIDED SCIATICA: Primary | ICD-10-CM

## 2018-12-20 PROCEDURE — 99214 OFFICE O/P EST MOD 30 MIN: CPT | Performed by: INTERNAL MEDICINE

## 2018-12-20 RX ORDER — GABAPENTIN 100 MG/1
100 CAPSULE ORAL 3 TIMES DAILY
Qty: 90 CAPSULE | Refills: 1 | Status: SHIPPED | OUTPATIENT
Start: 2018-12-20 | End: 2018-12-29

## 2018-12-20 NOTE — TELEPHONE ENCOUNTER
----- Message from Nancy Carey sent at 12/20/2018  3:37 PM EST -----  Pt [phone]  PT FORGOT  TO ASK IF HE NEEDS TO HAVE A MRI ? CALL PT TO DISCUSS

## 2018-12-20 NOTE — PROGRESS NOTES
Chief Complaint   Patient presents with   • Follow-up     FOLLOW UP BACK, HIIP AND LEG       History of Present Illness    The patient presents for follow-up of low back pain. He reports that the pain is improved. The current treatment consists of physical therapy, anti-inflammatories, steroids and narcotics. The narcotics used are lortab   and they are not giving relief. The patient is using the narcotics as needed.       He does not have associated fever, chills, weight loss, rashes, dysuria, hematuria, or a decreased urine stream. The pain does not radiate. There is no numbness. The patient denies loss of bladder control. The patient denies loss of bowel control. The patient reports no aggravating factors.    Review of Systems    HENT- Denies Nasal Discharge, Sore Throat, Ear Pain, Ear Drainage, Headache, Sinus Pain, Nasal Congestion, Decreased Hearing or Tinnitus.    GASTROINTESTINAL- Denies Abdominal Pain, Nausea, Vomiting, Diarrhea, Blood per Rectum, Constipation or Heartburn.    PULMONARY- Denies Wheezing, Dyspnea, Sputum Production, Cough, Hemoptysis or Pleuritic Chest Pain.    CARDIOVASCULAR- Denies Chest Pain, Claudication, Edema, Syncope, Palpitations or Irregular Heart Beat.    ENDOCRINE- Denies Cold Intolerance, Depression, Fatigue, Hair Loss, Heat Intolerance, Memory Loss, Polydypsia, Polyphagia, Polyuria, Sleep Disturbance, Weight Gain or Weight Loss.    Medications      Current Outpatient Medications:   •  atorvastatin (LIPITOR) 20 MG tablet, TAKE 1 TABLET DAILY, Disp: 90 tablet, Rfl: 3  •  buPROPion XL (WELLBUTRIN XL) 300 MG 24 hr tablet, Take 1 tablet by mouth Daily., Disp: 15 tablet, Rfl: 0  •  Diclofenac Sodium (PENNSAID) 2 % solution, Place 2 Pump on the skin as directed by provider Every 6 (Six) Hours As Needed., Disp: , Rfl:   •  dicyclomine (BENTYL) 20 MG tablet, Take 20 mg by mouth Every 6 (Six) Hours As Needed., Disp: , Rfl:   •  FLUoxetine (PROzac) 20 MG tablet, TAKE 1 TABLET DAILY, Disp:  90 tablet, Rfl: 1  •  naproxen (NAPROSYN) 500 MG tablet, Take 500 mg by mouth 2 (Two) Times a Day As Needed., Disp: , Rfl:   •  diazePAM (VALIUM) 5 MG tablet, Take 1 tablet by mouth At Night As Needed for Muscle Spasms., Disp: 6 tablet, Rfl: 0  •  gabapentin (NEURONTIN) 100 MG capsule, Take 1 capsule by mouth 3 (Three) Times a Day., Disp: 90 capsule, Rfl: 1     Allergies    Allergies   Allergen Reactions   • Lortab [Hydrocodone-Acetaminophen] Itching       Problem List    Patient Active Problem List   Diagnosis   • Anxiety   • PFO (patent foramen ovale)   • History of CVA (cerebrovascular accident)   • Obesity   • Concentration deficit   • Microangiopathy (CMS/HCC)       Medications, Allergies, Problems List and Past History were reviewed and updated.    Physical Examination    /82 (BP Location: Right arm, Patient Position: Sitting, Cuff Size: Adult)   Pulse 64   Temp 98.7 °F (37.1 °C) (Temporal)   Resp 18   Wt 100 kg (221 lb)   BMI 33.60 kg/m²     Abdomen: Soft, benign, non-tender with no masses, hernias, organomegaly or scars.    Back: The patient has a normal spinal curvature with no evidence of scoliosis. There are no skin lesions noted on the back. Palpation reveals  tenderness over the L3,4,5 areas in the midline and normal muscle tone. The straight leg raising test is positive. The back has decreased flexion, extension, rotation, and lateral bending.    Lower Extremity Neuro Exam: Muscle Strength is 5/5 in all major lower extremity muscle groups except the right L4-5 distribution which is 3/5. Deep Tendon Reflexes are 2+ and equal in the left patellar and Achilles distributions and 1+ in the right patellar and trace in the right achilles.. Sensation is decreased on the right side.    Impression and Assessment    Low Back Pain with Radiculopathy.    Plan    Low Back Pain with Radiculopathy Plan: Medication was added as noted below.    Koffi was seen today for follow-up.    Diagnoses and all  orders for this visit:    Acute right-sided low back pain with right-sided sciatica  -     gabapentin (NEURONTIN) 100 MG capsule; Take 1 capsule by mouth 3 (Three) Times a Day.  -     MRI Lumbar Spine Without Contrast; Future        Return to Office    The patient was instructed to return for follow-up in 1 month.    The patient was instructed to return sooner if the condition changes, worsens, or does not resolve.

## 2018-12-29 ENCOUNTER — OFFICE VISIT (OUTPATIENT)
Dept: NEUROSURGERY | Facility: CLINIC | Age: 58
End: 2018-12-29

## 2018-12-29 VITALS — WEIGHT: 220 LBS | BODY MASS INDEX: 33.34 KG/M2 | HEIGHT: 68 IN | TEMPERATURE: 97.5 F

## 2018-12-29 DIAGNOSIS — M51.16 LUMBAR DISC HERNIATION WITH RADICULOPATHY: Primary | ICD-10-CM

## 2018-12-29 DIAGNOSIS — M51.36 DDD (DEGENERATIVE DISC DISEASE), LUMBAR: ICD-10-CM

## 2018-12-29 DIAGNOSIS — M43.06 PARS DEFECT OF LUMBAR SPINE: ICD-10-CM

## 2018-12-29 PROCEDURE — 99243 OFF/OP CNSLTJ NEW/EST LOW 30: CPT | Performed by: NEUROLOGICAL SURGERY

## 2018-12-29 RX ORDER — HYDROCODONE BITARTRATE AND ACETAMINOPHEN 10; 325 MG/1; MG/1
1 TABLET ORAL 2 TIMES DAILY PRN
Qty: 50 TABLET | Refills: 0 | Status: SHIPPED | OUTPATIENT
Start: 2018-12-29 | End: 2019-09-17

## 2018-12-29 RX ORDER — GABAPENTIN 300 MG/1
300 CAPSULE ORAL 3 TIMES DAILY
Qty: 90 CAPSULE | Refills: 0 | OUTPATIENT
Start: 2018-12-29 | End: 2019-09-17

## 2018-12-29 NOTE — PROGRESS NOTES
Patient: Koffi Melo  : 1960    Primary Care Provider: Daniel Salazar MD    Requesting Provider: As above        History    Chief Complaint: Back and right thigh pain with sensory alteration.    History of Present Illness: Mr. Melo is a 58-year-old  who has had some spells of low-grade back pain in the past.  Several weeks ago he was twisting in his garage and felt horrible back pain.  He did physical therapy for couple weeks and improved.  About a week later his pain recurred and he's had more pain in the right anterior and lateral thigh than in his back.  He's also had numbness in his thigh.  The pain does not extend below his knee.  He has no bowel or bladder dysfunction.  He has no left leg symptoms.  He is worse with movement or sitting.  He does better standing and walking.  He was prescribed Neurontin but has yet to start that.  He has been taking some anti-inflammatory medication.    Review of Systems   Constitutional: Negative for activity change, appetite change, chills, diaphoresis, fatigue, fever and unexpected weight change.   HENT: Negative for congestion, dental problem, drooling, ear discharge, ear pain, facial swelling, hearing loss, mouth sores, nosebleeds, postnasal drip, rhinorrhea, sinus pressure, sneezing, sore throat, tinnitus, trouble swallowing and voice change.    Eyes: Negative for photophobia, pain, discharge, redness, itching and visual disturbance.   Respiratory: Positive for apnea. Negative for cough, choking, chest tightness, shortness of breath, wheezing and stridor.    Cardiovascular: Negative for chest pain, palpitations and leg swelling.   Gastrointestinal: Negative for abdominal distention, abdominal pain, anal bleeding, blood in stool, constipation, diarrhea, nausea, rectal pain and vomiting.   Musculoskeletal: Positive for arthralgias, back pain, joint swelling, myalgias and neck pain. Negative for gait problem and neck stiffness.  "  Skin: Negative for color change, pallor, rash and wound.   Allergic/Immunologic: Negative for environmental allergies, food allergies and immunocompromised state.   Neurological: Positive for numbness. Negative for dizziness, tremors, seizures, syncope, facial asymmetry, speech difficulty, weakness, light-headedness and headaches.   Hematological: Negative for adenopathy. Does not bruise/bleed easily.   Psychiatric/Behavioral: Positive for dysphoric mood. Negative for agitation, behavioral problems, confusion, decreased concentration, self-injury, sleep disturbance and suicidal ideas. The patient is not nervous/anxious and is not hyperactive.        The patient's past medical history, past surgical history, family history, and social history have been reviewed at length in the electronic medical record.    Physical Exam:   Temp 97.5 °F (36.4 °C)   Ht 172.7 cm (68\")   Wt 99.8 kg (220 lb)   BMI 33.45 kg/m²   CONSTITUTIONAL: Patient is well-nourished, pleasant and appears stated age.  CV: Heart regular rate and rhythm without murmur, rub, or gallop.  PULMONARY: Lungs are clear to ascultation.  MUSCULOSKELETAL:  Straight leg raising is negative.  Ghassan's Sign is negative.  ROM in back is somewhat limited in all directions.  Tenderness in the back to palpation is not observed.  NEUROLOGICAL:  Orientation, memory, attention span, language function, and cognition have been examined and are intact.  Strength is intact in the lower extremities to direct testing.  Muscle tone is normal throughout.  Station and gait are normal.  Sensation is intact to light touch testing throughout except rather globally in the front and sides of his right thigh where it is diminished.  Deep tendon reflexes are 1+ and symmetrical.  Coordination is intact.      Medical Decision Making    Data Review:   CT of the lumbar spine from the emergency room demonstrates bilateral pars defects at L5 without spondylolisthesis.  MRI of the lumbar " spine dated 12/28/18 demonstrates some diffuse degenerative disc disease.  There is an extruded disc rightward from L2-3 that ascends above the disc space.    Diagnosis:   1.  Right L2 radiculopathy secondary to disc herniation.  2.  Lumbar degenerative disc disease.  3.  L5 spondylolysis bilaterally without listhesis.    Treatment Options:   I prescribed Norco 10.  He will increase his Neurontin from 100 mg 3 times a day up to 300 mg 3 times a day over the next week or so.  He will continue physical therapy that will include pelvic traction.  He will follow-up in approximately 3 weeks.  If he is not improved or if he worsens then we may need to consider right L2-3 discectomy to free up the L2 nerve root.       Diagnosis Plan   1. Lumbar disc herniation with radiculopathy  Ambulatory Referral to Physical Therapy Evaluate and treat, Neuro    gabapentin (NEURONTIN) 300 MG capsule    HYDROcodone-acetaminophen (NORCO)  MG per tablet   2. DDD (degenerative disc disease), lumbar     3. Pars defect of lumbar spine             I, Dr. Perkins, personally performed the services described in the documentation, as scribed in my presence, and it is both accurate and complete.  Scribed for Ishan Perkins MD by Dayton Vernon CMA on 12/29/2018 at 10:38 AM

## 2019-01-22 ENCOUNTER — OFFICE VISIT (OUTPATIENT)
Dept: INTERNAL MEDICINE | Facility: CLINIC | Age: 59
End: 2019-01-22

## 2019-01-22 VITALS
WEIGHT: 235 LBS | BODY MASS INDEX: 35.73 KG/M2 | HEART RATE: 84 BPM | RESPIRATION RATE: 18 BRPM | SYSTOLIC BLOOD PRESSURE: 114 MMHG | TEMPERATURE: 98.4 F | DIASTOLIC BLOOD PRESSURE: 66 MMHG

## 2019-01-22 DIAGNOSIS — E78.5 HYPERLIPIDEMIA, UNSPECIFIED HYPERLIPIDEMIA TYPE: ICD-10-CM

## 2019-01-22 DIAGNOSIS — F41.9 ANXIETY: Primary | ICD-10-CM

## 2019-01-22 PROCEDURE — 99214 OFFICE O/P EST MOD 30 MIN: CPT | Performed by: INTERNAL MEDICINE

## 2019-01-22 NOTE — PROGRESS NOTES
Chief Complaint   Patient presents with   • Follow-up     1 MONTH FOLLOW UP        History of Present Illness    The patient presents for follow-up of anxiety. He denies currently having anxiety symptoms. He is not having panic attacks. His energy level is good. He denies agorophobia. He sleeps well. He is currently taking a medication. He states that his current anxiety symptoms are stable. The current medication regimen consists of fluoxetine and Wellbutrin XL. The patient denies having medication side effects including nausea, headaches, anxiety, increased depression, anorgasmia or fatigue.    The patient presents for a follow-up related to hyperlipidemia. He is following a low fat diet. He reports that he is not exercising. He is taking atorvastatin. The patient is taking his medication as prescribed. He reports no medication side effects, including muscle cramps, abdominal pain, headaches or weakness. He denies chest pain, shortness of breath, orthopnea, paroxysmal nocturnal dyspnea, dyspnea on exertion, edema, palpitations or syncope.    Review of Systems    PULMONARY- Denies Wheezing, Sputum Production, Cough, Hemoptysis or Pleuritic Chest Pain.    CARDIOVASCULAR- Denies Claudication or Irregular Heart Beat.    Medications      Current Outpatient Medications:   •  atorvastatin (LIPITOR) 20 MG tablet, TAKE 1 TABLET DAILY, Disp: 90 tablet, Rfl: 3  •  buPROPion XL (WELLBUTRIN XL) 300 MG 24 hr tablet, Take 1 tablet by mouth Daily., Disp: 15 tablet, Rfl: 0  •  FLUoxetine (PROzac) 20 MG tablet, TAKE 1 TABLET DAILY, Disp: 90 tablet, Rfl: 1  •  gabapentin (NEURONTIN) 300 MG capsule, Take 1 capsule by mouth 3 (Three) Times a Day., Disp: 90 capsule, Rfl: 0  •  HYDROcodone-acetaminophen (NORCO)  MG per tablet, Take 1 tablet by mouth 2 (Two) Times a Day As Needed for Moderate Pain ., Disp: 50 tablet, Rfl: 0  •  naproxen (NAPROSYN) 500 MG tablet, Take 500 mg by mouth 2 (Two) Times a Day As Needed., Disp: , Rfl:       Allergies    No Known Allergies    Problem List    Patient Active Problem List   Diagnosis   • Anxiety   • PFO (patent foramen ovale)   • History of CVA (cerebrovascular accident)   • Obesity   • Concentration deficit   • Microangiopathy (CMS/HCC)       Medications, Allergies, Problems List and Past History were reviewed and updated.    Physical Examination    /66 (BP Location: Right arm, Patient Position: Sitting, Cuff Size: Adult)   Pulse 84   Temp 98.4 °F (36.9 °C) (Temporal)   Resp 18   Wt 107 kg (235 lb)   BMI 35.73 kg/m²     HEENT: Facies- Within normal limits. Lids- Normal bilaterally. Conjunctiva- Clear bilaterally. Sclera- Anicteric bilaterally.    Lungs: Auscultation- Clear to auscultation bilaterally. There are no retractions, clubbing or cyanosis. The Expiratory to Inspiratory ratio is equal.    Cardiovascular: Heart- Normal Rate with Regular rhythm and no murmurs.    Abdomen: Soft, benign, non-tender with no masses, hernias, organomegaly or scars.    Impression and Assessment    Anxiety Disorder Unspecified.    Hyperlipidemia.    Plan    Hyperlipidemia Plan: The patient was instructed to exercise daily, eat a low fat diet and continue his medications.    Anxiety Disorder Unspecified Plan: The current plan was continued.    Koffi was seen today for follow-up.    Diagnoses and all orders for this visit:    Anxiety    Hyperlipidemia, unspecified hyperlipidemia type        Return to Office    The patient was instructed to return for follow-up in 2 months. Next Visit: Physical.    The patient was instructed to return sooner if the condition changes, worsens, or does not resolve.

## 2019-01-29 ENCOUNTER — OFFICE VISIT (OUTPATIENT)
Dept: NEUROSURGERY | Facility: CLINIC | Age: 59
End: 2019-01-29

## 2019-01-29 VITALS — HEIGHT: 68 IN | WEIGHT: 230 LBS | BODY MASS INDEX: 34.86 KG/M2 | TEMPERATURE: 97.5 F

## 2019-01-29 DIAGNOSIS — M43.06 PARS DEFECT OF LUMBAR SPINE: ICD-10-CM

## 2019-01-29 DIAGNOSIS — M51.16 LUMBAR DISC HERNIATION WITH RADICULOPATHY: Primary | ICD-10-CM

## 2019-01-29 DIAGNOSIS — M51.36 DDD (DEGENERATIVE DISC DISEASE), LUMBAR: ICD-10-CM

## 2019-01-29 PROCEDURE — 99213 OFFICE O/P EST LOW 20 MIN: CPT | Performed by: NEUROLOGICAL SURGERY

## 2019-01-29 NOTE — PROGRESS NOTES
Patient: Koffi Melo  : 1960    Primary Care Provider: Daniel Salazar MD    Requesting Provider: As above        History    Chief Complaint: Back and right thigh pain with sensory alteration.    History of Present Illness: Mr. Melo is a 58-year-old  who has had some low-grade back difficulties in the past.  A couple of months ago he was twisting in his garage and developed severe back pain.  He did some therapy.  Some of the symptoms extended into the right anterior and lateral thigh.  Studies demonstrated disc protrusion rightward at L2-3.  The Neurontin and additional therapy have been quite helpful.  He is no longer having thigh symptoms.  On occasion he's doing some of his home exercise routine but he has a very busy schedule with work and home responsibilities.    Review of Systems   Constitutional: Negative for activity change, appetite change, chills, diaphoresis, fatigue, fever and unexpected weight change.   HENT: Negative for congestion, dental problem, drooling, ear discharge, ear pain, facial swelling, hearing loss, mouth sores, nosebleeds, postnasal drip, rhinorrhea, sinus pressure, sneezing, sore throat, tinnitus, trouble swallowing and voice change.    Eyes: Negative for photophobia, pain, discharge, redness, itching and visual disturbance.   Respiratory: Positive for apnea. Negative for cough, choking, chest tightness, shortness of breath, wheezing and stridor.    Cardiovascular: Negative for chest pain, palpitations and leg swelling.   Gastrointestinal: Negative for abdominal distention, abdominal pain, anal bleeding, blood in stool, constipation, diarrhea, nausea, rectal pain and vomiting.   Musculoskeletal: Positive for arthralgias, back pain, joint swelling, myalgias and neck pain. Negative for gait problem and neck stiffness.   Skin: Negative for color change, pallor, rash and wound.   Allergic/Immunologic: Negative for environmental allergies, food  "allergies and immunocompromised state.   Neurological: Positive for numbness. Negative for dizziness, tremors, seizures, syncope, facial asymmetry, speech difficulty, weakness, light-headedness and headaches.   Hematological: Negative for adenopathy. Does not bruise/bleed easily.   Psychiatric/Behavioral: Positive for dysphoric mood. Negative for agitation, behavioral problems, confusion, decreased concentration, self-injury, sleep disturbance and suicidal ideas. The patient is not nervous/anxious and is not hyperactive.        The patient's past medical history, past surgical history, family history, and social history have been reviewed at length in the electronic medical record.    Physical Exam:   Temp 97.5 °F (36.4 °C) (Temporal)   Ht 172.7 cm (68\")   Wt 104 kg (230 lb)   BMI 34.97 kg/m²   MUSCULOSKELETAL:  Straight leg raising is negative.  Ghassan's Sign is negative.  ROM in back is normal.  Tenderness in the back to palpation is not observed.  NEUROLOGICAL:  Strength is intact in the lower extremities to direct testing.  Muscle tone is normal throughout.  Station and gait are normal.  Sensation is intact to light touch testing throughout.      Medical Decision Making    Data Review:   CT of the lumbar spine from the emergency room demonstrates bilateral pars defects at L5 without spondylolisthesis.  MRI of the lumbar spine dated 12/28/18 demonstrates some diffuse degenerative disc disease.  There is an extruded disc rightward from L2-3 that ascends above the disc space.    Diagnosis:   1.  Right L2 radiculopathy secondary to disc herniation.  2.  Lumbar degenerative disc disease.  3.  L5 spondylolysis bilaterally without listhesis.    Treatment Options:   The patient is better.  He will continue his Neurontin and home exercise regimen.  He will follow-up in our clinic in about 6 weeks.  If he continues to do well then we will slowly wean off the Neurontin.       Diagnosis Plan   1. Lumbar disc herniation " with radiculopathy     2. DDD (degenerative disc disease), lumbar     3. Pars defect of lumbar spine         Scribed for Ishan Perkins MD by Sirena Stone CMA on 01/29/2019 at 4:09 PM      I, Dr. Perkins, personally performed the services described in the documentation, as scribed in my presence, and it is both accurate and complete.

## 2019-01-30 ENCOUNTER — TELEPHONE (OUTPATIENT)
Dept: NEUROSURGERY | Facility: CLINIC | Age: 59
End: 2019-01-30

## 2019-01-30 RX ORDER — MELOXICAM 15 MG/1
15 TABLET ORAL DAILY
Qty: 30 TABLET | Refills: 6 | Status: SHIPPED | OUTPATIENT
Start: 2019-01-30 | End: 2021-01-20

## 2019-01-30 NOTE — TELEPHONE ENCOUNTER
Adv pt that we have increased the dose on the meloxicam, try for 1 week and give us call with update

## 2019-01-30 NOTE — TELEPHONE ENCOUNTER
Provider:  Gregorio  Caller: Koffi  Phone #:  [phone]    Last visit:   1/29/19  Next visit: n/a    TAMAR:         Reason for call:           Pt stating that insurance has denied the medication given, requesting change in medication if possible.

## 2019-02-23 DIAGNOSIS — F41.9 ANXIETY: ICD-10-CM

## 2019-02-25 RX ORDER — FLUOXETINE 20 MG/1
TABLET, FILM COATED ORAL
Qty: 90 TABLET | Refills: 1 | Status: SHIPPED | OUTPATIENT
Start: 2019-02-25 | End: 2019-04-18

## 2019-02-25 RX ORDER — BUPROPION HYDROCHLORIDE 300 MG/1
TABLET ORAL
Qty: 90 TABLET | Refills: 1 | Status: SHIPPED | OUTPATIENT
Start: 2019-02-25 | End: 2019-08-24 | Stop reason: SDUPTHER

## 2019-02-26 ENCOUNTER — OFFICE VISIT (OUTPATIENT)
Dept: INTERNAL MEDICINE | Facility: CLINIC | Age: 59
End: 2019-02-26

## 2019-02-26 VITALS
DIASTOLIC BLOOD PRESSURE: 76 MMHG | RESPIRATION RATE: 18 BRPM | HEART RATE: 78 BPM | HEIGHT: 68 IN | WEIGHT: 223.2 LBS | BODY MASS INDEX: 33.83 KG/M2 | SYSTOLIC BLOOD PRESSURE: 128 MMHG | TEMPERATURE: 97.8 F

## 2019-02-26 DIAGNOSIS — R11.2 NAUSEA AND VOMITING, INTRACTABILITY OF VOMITING NOT SPECIFIED, UNSPECIFIED VOMITING TYPE: Primary | ICD-10-CM

## 2019-02-26 DIAGNOSIS — R42 DIZZINESS: ICD-10-CM

## 2019-02-26 LAB
ANION GAP SERPL CALCULATED.3IONS-SCNC: 8 MMOL/L (ref 3–11)
BASOPHILS # BLD AUTO: 0.04 10*3/MM3 (ref 0–0.2)
BASOPHILS NFR BLD AUTO: 0.5 % (ref 0–1)
BILIRUB BLD-MCNC: NEGATIVE MG/DL
BUN BLD-MCNC: 20 MG/DL (ref 9–23)
BUN/CREAT SERPL: 18.2 (ref 7–25)
CALCIUM SPEC-SCNC: 9.5 MG/DL (ref 8.7–10.4)
CHLORIDE SERPL-SCNC: 107 MMOL/L (ref 99–109)
CLARITY, POC: CLEAR
CO2 SERPL-SCNC: 23 MMOL/L (ref 20–31)
COLOR UR: ABNORMAL
CREAT BLD-MCNC: 1.1 MG/DL (ref 0.6–1.3)
DEPRECATED RDW RBC AUTO: 40.3 FL (ref 37–54)
EOSINOPHIL # BLD AUTO: 0.19 10*3/MM3 (ref 0–0.3)
EOSINOPHIL NFR BLD AUTO: 2.2 % (ref 0–3)
ERYTHROCYTE [DISTWIDTH] IN BLOOD BY AUTOMATED COUNT: 12.5 % (ref 11.3–14.5)
EXPIRATION DATE: ABNORMAL
EXPIRATION DATE: NORMAL
EXPIRATION DATE: NORMAL
FLUAV AG NPH QL: NEGATIVE
FLUBV AG NPH QL: NEGATIVE
GFR SERPL CREATININE-BSD FRML MDRD: 69 ML/MIN/1.73
GLUCOSE BLD-MCNC: 104 MG/DL (ref 70–100)
GLUCOSE UR STRIP-MCNC: NEGATIVE MG/DL
HCT VFR BLD AUTO: 47.6 % (ref 38.9–50.9)
HGB BLD-MCNC: 16.1 G/DL (ref 13.1–17.5)
IMM GRANULOCYTES # BLD AUTO: 0.02 10*3/MM3 (ref 0–0.05)
IMM GRANULOCYTES NFR BLD AUTO: 0.2 % (ref 0–0.6)
INTERNAL CONTROL: NORMAL
INTERNAL CONTROL: NORMAL
KETONES UR QL: ABNORMAL
LEUKOCYTE EST, POC: NEGATIVE
LYMPHOCYTES # BLD AUTO: 1.43 10*3/MM3 (ref 0.6–4.8)
LYMPHOCYTES NFR BLD AUTO: 16.7 % (ref 24–44)
Lab: ABNORMAL
Lab: NORMAL
Lab: NORMAL
MCH RBC QN AUTO: 29.9 PG (ref 27–31)
MCHC RBC AUTO-ENTMCNC: 33.8 G/DL (ref 32–36)
MCV RBC AUTO: 88.3 FL (ref 80–99)
MONOCYTES # BLD AUTO: 0.57 10*3/MM3 (ref 0–1)
MONOCYTES NFR BLD AUTO: 6.7 % (ref 0–12)
NEUTROPHILS # BLD AUTO: 6.3 10*3/MM3 (ref 1.5–8.3)
NEUTROPHILS NFR BLD AUTO: 73.7 % (ref 41–71)
NITRITE UR-MCNC: NEGATIVE MG/ML
PH UR: 6 [PH] (ref 5–8)
PLATELET # BLD AUTO: 208 10*3/MM3 (ref 150–450)
PMV BLD AUTO: 10 FL (ref 6–12)
POTASSIUM BLD-SCNC: 3.7 MMOL/L (ref 3.5–5.5)
PROT UR STRIP-MCNC: NEGATIVE MG/DL
RBC # BLD AUTO: 5.39 10*6/MM3 (ref 4.2–5.76)
RBC # UR STRIP: NEGATIVE /UL
S PYO AG THROAT QL: NEGATIVE
SODIUM BLD-SCNC: 138 MMOL/L (ref 132–146)
SP GR UR: 1.02 (ref 1–1.03)
UROBILINOGEN UR QL: NORMAL
WBC NRBC COR # BLD: 8.55 10*3/MM3 (ref 3.5–10.8)

## 2019-02-26 PROCEDURE — 80048 BASIC METABOLIC PNL TOTAL CA: CPT | Performed by: NURSE PRACTITIONER

## 2019-02-26 PROCEDURE — 36415 COLL VENOUS BLD VENIPUNCTURE: CPT | Performed by: NURSE PRACTITIONER

## 2019-02-26 PROCEDURE — 85025 COMPLETE CBC W/AUTO DIFF WBC: CPT | Performed by: NURSE PRACTITIONER

## 2019-02-26 PROCEDURE — 81003 URINALYSIS AUTO W/O SCOPE: CPT | Performed by: NURSE PRACTITIONER

## 2019-02-26 PROCEDURE — 96372 THER/PROPH/DIAG INJ SC/IM: CPT | Performed by: NURSE PRACTITIONER

## 2019-02-26 PROCEDURE — 87880 STREP A ASSAY W/OPTIC: CPT | Performed by: NURSE PRACTITIONER

## 2019-02-26 PROCEDURE — 99213 OFFICE O/P EST LOW 20 MIN: CPT | Performed by: NURSE PRACTITIONER

## 2019-02-26 PROCEDURE — 87804 INFLUENZA ASSAY W/OPTIC: CPT | Performed by: NURSE PRACTITIONER

## 2019-02-26 RX ORDER — MECLIZINE HCL 25MG 25 MG/1
25 TABLET, CHEWABLE ORAL 3 TIMES DAILY PRN
Qty: 21 EACH | Refills: 0 | Status: SHIPPED | OUTPATIENT
Start: 2019-02-26 | End: 2019-09-17

## 2019-02-26 RX ORDER — ONDANSETRON 2 MG/ML
4 INJECTION INTRAMUSCULAR; INTRAVENOUS ONCE
Status: COMPLETED | OUTPATIENT
Start: 2019-02-26 | End: 2019-02-26

## 2019-02-26 RX ORDER — PROMETHAZINE HYDROCHLORIDE 25 MG/1
25 TABLET ORAL EVERY 8 HOURS PRN
Qty: 21 TABLET | Refills: 0 | Status: SHIPPED | OUTPATIENT
Start: 2019-02-26 | End: 2020-02-24

## 2019-02-26 RX ADMIN — ONDANSETRON 4 MG: 2 INJECTION INTRAMUSCULAR; INTRAVENOUS at 17:19

## 2019-02-26 NOTE — PROGRESS NOTES
"Subjective:    Koffi Melo is a 58 y.o. male.     Chief Complaint   Patient presents with   • Vomiting     yellow stomach bile x1 day    • Dizziness     x1 day around 2-3 AM        History of Present Illness   Patient states he woke up at 2:00 AM and became dizzy. Dizziness worsened with movement and room appeared to be spinning. He woke up at 7:00 AM and began vomiting. Patient states he has vomited \"yellow bile\" around 7:00 and 1:30 today (2 episodes). No fever. He has eaten a cookie today. No history of vertigo. No ill contacts. Daughter has driven him today.      Current Outpatient Medications:   •  atorvastatin (LIPITOR) 20 MG tablet, TAKE 1 TABLET DAILY, Disp: 90 tablet, Rfl: 3  •  buPROPion XL (WELLBUTRIN XL) 300 MG 24 hr tablet, TAKE 1 TABLET DAILY, Disp: 90 tablet, Rfl: 1  •  FLUoxetine (PROzac) 20 MG tablet, TAKE 1 TABLET DAILY, Disp: 90 tablet, Rfl: 1  •  gabapentin (NEURONTIN) 300 MG capsule, Take 1 capsule by mouth 3 (Three) Times a Day., Disp: 90 capsule, Rfl: 0  •  HYDROcodone-acetaminophen (NORCO)  MG per tablet, Take 1 tablet by mouth 2 (Two) Times a Day As Needed for Moderate Pain ., Disp: 50 tablet, Rfl: 0  •  meloxicam (MOBIC) 15 MG tablet, Take 1 tablet by mouth Daily., Disp: 30 tablet, Rfl: 6  •  meclizine 25 MG chewable tablet chewable tablet, Chew 1 tablet 3 (Three) Times a Day As Needed (dizziness)., Disp: 21 each, Rfl: 0  •  promethazine (PHENERGAN) 25 MG tablet, Take 1 tablet by mouth Every 8 (Eight) Hours As Needed for Nausea or Vomiting., Disp: 21 tablet, Rfl: 0  No current facility-administered medications for this visit.      The following portions of the patient's history were reviewed and updated as appropriate: allergies, current medications, past family history, past medical history, past social history, past surgical history and problem list.    Review of Systems   Constitutional: Negative for chills, fatigue and fever.   HENT: Negative for congestion, ear pain, " "postnasal drip, rhinorrhea, sinus pressure, sneezing and sore throat.    Eyes: Negative for pain, discharge, redness and itching.   Respiratory: Negative for cough, chest tightness, shortness of breath and wheezing.    Cardiovascular: Negative for chest pain.   Gastrointestinal: Positive for nausea and vomiting. Negative for abdominal pain and diarrhea.   Musculoskeletal: Negative for arthralgias and myalgias.   Skin: Negative for rash.   Neurological: Positive for dizziness. Negative for headaches.   Hematological: Negative for adenopathy.       Objective:    /76   Pulse 78   Temp 97.8 °F (36.6 °C)   Resp 18   Ht 172.7 cm (68\")   Wt 101 kg (223 lb 3.2 oz)   BMI 33.94 kg/m²     Physical Exam   Constitutional: He is oriented to person, place, and time. He appears well-developed and well-nourished.  Non-toxic appearance. He does not have a sickly appearance. He appears ill. No distress.   HENT:   Head: Normocephalic and atraumatic.   Right Ear: Tympanic membrane, external ear and ear canal normal.   Left Ear: Tympanic membrane, external ear and ear canal normal.   Nose: Nose normal.   Mouth/Throat: Oropharynx is clear and moist and mucous membranes are normal. No oral lesions.   Eyes: Conjunctivae and lids are normal.   Neck: Normal range of motion. Neck supple. Carotid bruit is not present. No thyromegaly present.   Cardiovascular: Normal rate, regular rhythm and normal heart sounds. Exam reveals no gallop and no friction rub.   No murmur heard.  Pulmonary/Chest: Effort normal and breath sounds normal.   Abdominal: Soft. Bowel sounds are normal. He exhibits no distension, no abdominal bruit and no mass. There is no hepatosplenomegaly. There is generalized tenderness. There is no rigidity, no rebound, no guarding and no CVA tenderness.   Musculoskeletal: Normal range of motion.   Lymphadenopathy:     He has no cervical adenopathy.   Neurological: He is alert and oriented to person, place, and time.   Skin: " Skin is warm, dry and intact. No rash noted. There is pallor.   Psychiatric: He has a normal mood and affect. His speech is normal.   Nursing note and vitals reviewed.      Assessment/Plan:    Koffi was seen today for vomiting and dizziness.    Diagnoses and all orders for this visit:    Nausea and vomiting, intractability of vomiting not specified, unspecified vomiting type  -     POC Influenza A / B  -     POC Rapid Strep A  -     ondansetron (ZOFRAN) injection 4 mg; Inject 2 mL into the appropriate muscle as directed by prescriber 1 (One) Time.  -     POC Urinalysis Dipstick, Automated  -     Basic Metabolic Panel  -     CBC & Differential  -     promethazine (PHENERGAN) 25 MG tablet; Take 1 tablet by mouth Every 8 (Eight) Hours As Needed for Nausea or Vomiting.  -     CBC Auto Differential    Dizziness  -     meclizine 25 MG chewable tablet chewable tablet; Chew 1 tablet 3 (Three) Times a Day As Needed (dizziness).    Discussed BRAT diet and advance as tolerated. Discussed hydration efforts.    Return if symptoms worsen or fail to improve.

## 2019-02-27 ENCOUNTER — TELEPHONE (OUTPATIENT)
Dept: INTERNAL MEDICINE | Facility: CLINIC | Age: 59
End: 2019-02-27

## 2019-02-27 NOTE — TELEPHONE ENCOUNTER
----- Message from JENNIFER Rawls sent at 2/26/2019 10:57 PM EST -----  Please inform patient all labs stable with mildly elevated glucose of 104. Follow up if no improvement.

## 2019-03-05 ENCOUNTER — TELEPHONE (OUTPATIENT)
Dept: INTERNAL MEDICINE | Facility: CLINIC | Age: 59
End: 2019-03-05

## 2019-03-05 NOTE — TELEPHONE ENCOUNTER
RADAMESVM FOR PT THAT I WAS RETURNING HIS CALL AND TO PLEASE CALL BACK.  OFC. # [phone].     JUST NEEDS TO BE SCHEDULED WITH DR MILLIGAN FOR NEXT Tuesday.

## 2019-03-05 NOTE — TELEPHONE ENCOUNTER
----- Message from Sheela Cardenas sent at 3/5/2019 10:02 AM EST -----  MALLORY ANNA CALLING TO SEE IF DR PIZARRO COULD FIT HIM IN ON Tuesday 3/12/19 IN THE MORNING. HE BELIEVES HE HAS RHEUMATOID ARTHRITIS. WESLEY CAN BE REACHED [phone]      HIS IS AWARE DR PIZARRO WILL NOT SEE THIS MSG UNTIL MONDAY

## 2019-03-12 ENCOUNTER — OFFICE VISIT (OUTPATIENT)
Dept: INTERNAL MEDICINE | Facility: CLINIC | Age: 59
End: 2019-03-12

## 2019-03-12 ENCOUNTER — HOSPITAL ENCOUNTER (OUTPATIENT)
Dept: GENERAL RADIOLOGY | Facility: HOSPITAL | Age: 59
Discharge: HOME | End: 2019-03-12
Admitting: INTERNAL MEDICINE

## 2019-03-12 VITALS
DIASTOLIC BLOOD PRESSURE: 76 MMHG | HEART RATE: 64 BPM | BODY MASS INDEX: 34.36 KG/M2 | TEMPERATURE: 97.5 F | WEIGHT: 226 LBS | SYSTOLIC BLOOD PRESSURE: 124 MMHG | RESPIRATION RATE: 18 BRPM

## 2019-03-12 DIAGNOSIS — M25.562 CHRONIC PAIN OF BOTH KNEES: ICD-10-CM

## 2019-03-12 DIAGNOSIS — M25.561 CHRONIC PAIN OF BOTH KNEES: ICD-10-CM

## 2019-03-12 DIAGNOSIS — M25.562 CHRONIC PAIN OF BOTH KNEES: Primary | ICD-10-CM

## 2019-03-12 DIAGNOSIS — G89.29 CHRONIC PAIN OF BOTH KNEES: ICD-10-CM

## 2019-03-12 DIAGNOSIS — G89.29 CHRONIC PAIN OF BOTH KNEES: Primary | ICD-10-CM

## 2019-03-12 DIAGNOSIS — M25.561 CHRONIC PAIN OF BOTH KNEES: Primary | ICD-10-CM

## 2019-03-12 LAB
ALBUMIN SERPL-MCNC: 4.65 G/DL (ref 3.2–4.8)
ALBUMIN/GLOB SERPL: 2.4 G/DL (ref 1.5–2.5)
ALP SERPL-CCNC: 70 U/L (ref 25–100)
ALT SERPL W P-5'-P-CCNC: 41 U/L (ref 7–40)
ANION GAP SERPL CALCULATED.3IONS-SCNC: 6 MMOL/L (ref 3–11)
ASO AB SERPL-ACNC: NEGATIVE [IU]/ML
AST SERPL-CCNC: 33 U/L (ref 0–33)
BASOPHILS # BLD AUTO: 0.04 10*3/MM3 (ref 0–0.2)
BASOPHILS NFR BLD AUTO: 0.5 % (ref 0–1)
BILIRUB SERPL-MCNC: 0.6 MG/DL (ref 0.3–1.2)
BUN BLD-MCNC: 18 MG/DL (ref 9–23)
BUN/CREAT SERPL: 15.9 (ref 7–25)
CALCIUM SPEC-SCNC: 9.6 MG/DL (ref 8.7–10.4)
CHLORIDE SERPL-SCNC: 107 MMOL/L (ref 99–109)
CO2 SERPL-SCNC: 25 MMOL/L (ref 20–31)
CREAT BLD-MCNC: 1.13 MG/DL (ref 0.6–1.3)
CRP SERPL-MCNC: 0.19 MG/DL (ref 0–1)
DEPRECATED RDW RBC AUTO: 41 FL (ref 37–54)
EOSINOPHIL # BLD AUTO: 0.24 10*3/MM3 (ref 0–0.3)
EOSINOPHIL NFR BLD AUTO: 3.1 % (ref 0–3)
ERYTHROCYTE [DISTWIDTH] IN BLOOD BY AUTOMATED COUNT: 12.5 % (ref 11.3–14.5)
GFR SERPL CREATININE-BSD FRML MDRD: 67 ML/MIN/1.73
GLOBULIN UR ELPH-MCNC: 2 GM/DL
GLUCOSE BLD-MCNC: 95 MG/DL (ref 70–100)
HCT VFR BLD AUTO: 48.6 % (ref 38.9–50.9)
HGB BLD-MCNC: 16.2 G/DL (ref 13.1–17.5)
IMM GRANULOCYTES # BLD AUTO: 0.02 10*3/MM3 (ref 0–0.05)
IMM GRANULOCYTES NFR BLD AUTO: 0.3 % (ref 0–0.6)
LYMPHOCYTES # BLD AUTO: 1.44 10*3/MM3 (ref 0.6–4.8)
LYMPHOCYTES NFR BLD AUTO: 18.7 % (ref 24–44)
MCH RBC QN AUTO: 30 PG (ref 27–31)
MCHC RBC AUTO-ENTMCNC: 33.3 G/DL (ref 32–36)
MCV RBC AUTO: 90 FL (ref 80–99)
MONOCYTES # BLD AUTO: 0.55 10*3/MM3 (ref 0–1)
MONOCYTES NFR BLD AUTO: 7.2 % (ref 0–12)
NEUTROPHILS # BLD AUTO: 5.42 10*3/MM3 (ref 1.5–8.3)
NEUTROPHILS NFR BLD AUTO: 70.5 % (ref 41–71)
PLATELET # BLD AUTO: 207 10*3/MM3 (ref 150–450)
PMV BLD AUTO: 10.2 FL (ref 6–12)
POTASSIUM BLD-SCNC: 4.2 MMOL/L (ref 3.5–5.5)
PROT SERPL-MCNC: 6.6 G/DL (ref 5.7–8.2)
RBC # BLD AUTO: 5.4 10*6/MM3 (ref 4.2–5.76)
RHEUMATOID FACT SERPL-ACNC: NEGATIVE [IU]/ML
SODIUM BLD-SCNC: 138 MMOL/L (ref 132–146)
WBC NRBC COR # BLD: 7.69 10*3/MM3 (ref 3.5–10.8)

## 2019-03-12 PROCEDURE — 36415 COLL VENOUS BLD VENIPUNCTURE: CPT | Performed by: INTERNAL MEDICINE

## 2019-03-12 PROCEDURE — 86430 RHEUMATOID FACTOR TEST QUAL: CPT | Performed by: INTERNAL MEDICINE

## 2019-03-12 PROCEDURE — 99214 OFFICE O/P EST MOD 30 MIN: CPT | Performed by: INTERNAL MEDICINE

## 2019-03-12 PROCEDURE — 73562 X-RAY EXAM OF KNEE 3: CPT

## 2019-03-12 PROCEDURE — 86140 C-REACTIVE PROTEIN: CPT | Performed by: INTERNAL MEDICINE

## 2019-03-12 PROCEDURE — 86200 CCP ANTIBODY: CPT | Performed by: INTERNAL MEDICINE

## 2019-03-12 PROCEDURE — 86618 LYME DISEASE ANTIBODY: CPT | Performed by: INTERNAL MEDICINE

## 2019-03-12 PROCEDURE — 86038 ANTINUCLEAR ANTIBODIES: CPT | Performed by: INTERNAL MEDICINE

## 2019-03-12 PROCEDURE — 80053 COMPREHEN METABOLIC PANEL: CPT | Performed by: INTERNAL MEDICINE

## 2019-03-12 PROCEDURE — 85025 COMPLETE CBC W/AUTO DIFF WBC: CPT | Performed by: INTERNAL MEDICINE

## 2019-03-12 PROCEDURE — 86063 ANTISTREPTOLYSIN O SCREEN: CPT | Performed by: INTERNAL MEDICINE

## 2019-03-12 NOTE — PROGRESS NOTES
Chief Complaint   Patient presents with   • BILATERAL KNEE PAIN       History of Present Illness    The patient presents with pain in the knees of two months duration. There is no history of trauma. The patient has swelling associated with the pain. There is stiffness. The joint stiffness is present most of the time. The patient denies a history of overuse or repetitive motion with the affected joints.    The joint pain is aggravated by motion, walking, bending, extending, touching the affected area and manipulation of the affected area. The pain has no alleviating factors noted. He has an appointment with orthopedic surgery later today.     The patient denies dry eyes, shortness of breath, fevers, cough, dry mouth, hematuria, headaches or abdominal pain. There are no associated insect bites. There is no family history of rheumatoid arthritis, juvenile rheumatoid arthritis, systemic lupus erythematosis or gout. The patient denies a personal history of malignancy.    Review of Systems    CONSTITUTIONAL- Denies Unexplained Weight Loss, Chills, Sweats, Fatigue, Weakness or Malaise.    GASTROINTESTINAL- Denies Nausea, Vomiting, Diarrhea, Blood per Rectum, Constipation or Heartburn.    MUSCULOSKELETAL- Reports: Joint Pain, Joint Stiffness, Decreased Range of Motion and Joint Swelling. Denies: Erythema of Joints.    INTEGUMENTARY- Denies Rash, Lumps, Sores, Itching, Dryness, Color Change, Changes in Hair, Brittle Nails, Discolored Nails, Thickened Nails, Hair Loss, Growths or Alopecia.    Medications      Current Outpatient Medications:   •  atorvastatin (LIPITOR) 20 MG tablet, TAKE 1 TABLET DAILY, Disp: 90 tablet, Rfl: 3  •  buPROPion XL (WELLBUTRIN XL) 300 MG 24 hr tablet, TAKE 1 TABLET DAILY, Disp: 90 tablet, Rfl: 1  •  FLUoxetine (PROzac) 20 MG tablet, TAKE 1 TABLET DAILY, Disp: 90 tablet, Rfl: 1  •  gabapentin (NEURONTIN) 300 MG capsule, Take 1 capsule by mouth 3 (Three) Times a Day., Disp: 90 capsule, Rfl: 0  •   HYDROcodone-acetaminophen (NORCO)  MG per tablet, Take 1 tablet by mouth 2 (Two) Times a Day As Needed for Moderate Pain ., Disp: 50 tablet, Rfl: 0  •  meclizine 25 MG chewable tablet chewable tablet, Chew 1 tablet 3 (Three) Times a Day As Needed (dizziness)., Disp: 21 each, Rfl: 0  •  meloxicam (MOBIC) 15 MG tablet, Take 1 tablet by mouth Daily., Disp: 30 tablet, Rfl: 6  •  promethazine (PHENERGAN) 25 MG tablet, Take 1 tablet by mouth Every 8 (Eight) Hours As Needed for Nausea or Vomiting., Disp: 21 tablet, Rfl: 0     Allergies    No Known Allergies    Problem List    Patient Active Problem List   Diagnosis   • Anxiety   • PFO (patent foramen ovale)   • History of CVA (cerebrovascular accident)   • Obesity   • Concentration deficit   • Microangiopathy (CMS/HCC)       Medications, Allergies, Problems List and Past History were reviewed and updated.    Physical Examination    /76 (BP Location: Left arm, Patient Position: Sitting, Cuff Size: Adult)   Pulse 64   Temp 97.5 °F (36.4 °C) (Temporal)   Resp 18   Wt 103 kg (226 lb)   BMI 34.36 kg/m²     Knees: The knees are symmetric with normal lorene landmarks. There is pain over the right medial joint space, pain over the left medial joint space, pain over the right lateral joint space and pain over the left lateral joint space but there is no pain over the right tibial tubercle, pain over the left tibial tubercle, pain over the right popliteal fossa, pain over the left popliteal fossa, fullness in the right popliteal fossa or fullness in the left popliteal fossa. The patella tracks midline bilaterally. There is no patellar pain with quadriceps contraction (Grind Test)on either side. There is no pain over the prepatellar bursae bilaterally. The Abduction Stress Test for medial collateral ligaments is normal bilaterally. The Adduction Stress Test for lateral collateral ligaments is normal bilaterally. The Anterior Drawer Test is normal bilaterally. The  Posterior Drawer Test is normal bilaterally. Lachman Test is normal bilaterally. Brien Test of the medial and lateral meniscus is normal bilaterally.    Radiology    My personal interpretation of the x-rays is as stated below:    The X-ray of the knees reveals normal lorene structure.    Impression and Assessment    Knee Pain.    Plan    Knee Pain Plan: Await test results. May need rheumatology appt after seeing orthopedic surgeon.    Koffi was seen today for bilateral knee pain.    Diagnoses and all orders for this visit:    Chronic pain of both knees  -     XR Knee 3 View Bilateral; Future  -     Comprehensive Metabolic Panel  -     C-reactive Protein  -     CBC & Differential  -     Lyme, Total Antibody Test / Reflex  -     FATIMAH  -     Antistreptolysin O Screen  -     Rheumatoid Factor  -     Cyclic Citrul Peptide Antibody, IgG / IgA  -     CBC Auto Differential        Return to Office    The patient was instructed to return for follow-up in 1 month. Next Visit: Follow-up.    The patient was instructed to return sooner if the condition changes, worsens, or does not resolve.

## 2019-03-13 LAB
ANA SER QL: NEGATIVE
B BURGDOR IGG+IGM SER-ACNC: <0.91 ISR (ref 0–0.9)

## 2019-03-14 LAB — CCP IGA+IGG SERPL IA-ACNC: 5 UNITS (ref 0–19)

## 2019-04-01 ENCOUNTER — TELEPHONE (OUTPATIENT)
Dept: INTERNAL MEDICINE | Facility: CLINIC | Age: 59
End: 2019-04-01

## 2019-04-01 NOTE — TELEPHONE ENCOUNTER
----- Message from Diana Butterfield sent at 3/28/2019  4:36 PM EDT -----  PATIENT CALLED AND ASKED THAT WE CALL HIM BACK WITH HIS RECENT LAB RESULTS. PATIENT CAN BE REACHED [phone].

## 2019-04-01 NOTE — TELEPHONE ENCOUNTER
Koffi Melo [phone]  [facility] advising lab results are in. Office number given for call back.

## 2019-04-02 NOTE — TELEPHONE ENCOUNTER
Koffi Melo [phone]  Spoke to pt, advised of clinical lab results. Good verbal understanding. Pt confirmed he was already aware, his wife is in the medical field and reviewed his labs with him.

## 2019-04-18 ENCOUNTER — OFFICE VISIT (OUTPATIENT)
Dept: INTERNAL MEDICINE | Facility: CLINIC | Age: 59
End: 2019-04-18

## 2019-04-18 VITALS
SYSTOLIC BLOOD PRESSURE: 122 MMHG | HEIGHT: 67 IN | WEIGHT: 216 LBS | BODY MASS INDEX: 33.9 KG/M2 | DIASTOLIC BLOOD PRESSURE: 84 MMHG | TEMPERATURE: 97.8 F | RESPIRATION RATE: 16 BRPM | HEART RATE: 72 BPM

## 2019-04-18 DIAGNOSIS — Z12.5 PROSTATE CANCER SCREENING: ICD-10-CM

## 2019-04-18 DIAGNOSIS — Z00.00 PHYSICAL EXAM: Primary | ICD-10-CM

## 2019-04-18 DIAGNOSIS — F41.9 ANXIETY: ICD-10-CM

## 2019-04-18 DIAGNOSIS — E78.5 HYPERLIPIDEMIA, UNSPECIFIED HYPERLIPIDEMIA TYPE: ICD-10-CM

## 2019-04-18 LAB
BASOPHILS # BLD AUTO: 0.05 10*3/MM3 (ref 0–0.2)
BASOPHILS NFR BLD AUTO: 0.8 % (ref 0–1.5)
BILIRUB BLD-MCNC: NEGATIVE MG/DL
CLARITY, POC: CLEAR
COLOR UR: NORMAL
DEPRECATED RDW RBC AUTO: 40.6 FL (ref 37–54)
EOSINOPHIL # BLD AUTO: 0.13 10*3/MM3 (ref 0–0.4)
EOSINOPHIL NFR BLD AUTO: 2.2 % (ref 0.3–6.2)
ERYTHROCYTE [DISTWIDTH] IN BLOOD BY AUTOMATED COUNT: 12.3 % (ref 12.3–15.4)
EXPIRATION DATE: NORMAL
GLUCOSE UR STRIP-MCNC: NEGATIVE MG/DL
HCT VFR BLD AUTO: 45.9 % (ref 37.5–51)
HCV AB SER DONR QL: NORMAL
HGB BLD-MCNC: 15 G/DL (ref 13–17.7)
IMM GRANULOCYTES # BLD AUTO: 0.02 10*3/MM3 (ref 0–0.05)
IMM GRANULOCYTES NFR BLD AUTO: 0.3 % (ref 0–0.5)
KETONES UR QL: NEGATIVE
LEUKOCYTE EST, POC: NEGATIVE
LYMPHOCYTES # BLD AUTO: 1.29 10*3/MM3 (ref 0.7–3.1)
LYMPHOCYTES NFR BLD AUTO: 21.4 % (ref 19.6–45.3)
Lab: NORMAL
MCH RBC QN AUTO: 29.6 PG (ref 26.6–33)
MCHC RBC AUTO-ENTMCNC: 32.7 G/DL (ref 31.5–35.7)
MCV RBC AUTO: 90.7 FL (ref 79–97)
MONOCYTES # BLD AUTO: 0.47 10*3/MM3 (ref 0.1–0.9)
MONOCYTES NFR BLD AUTO: 7.8 % (ref 5–12)
NEUTROPHILS # BLD AUTO: 4.08 10*3/MM3 (ref 1.4–7)
NEUTROPHILS NFR BLD AUTO: 67.5 % (ref 42.7–76)
NITRITE UR-MCNC: NEGATIVE MG/ML
NRBC BLD AUTO-RTO: 0 /100 WBC (ref 0–0)
PH UR: 5 [PH] (ref 5–8)
PLATELET # BLD AUTO: 223 10*3/MM3 (ref 140–450)
PMV BLD AUTO: 10.8 FL (ref 6–12)
PROT UR STRIP-MCNC: NEGATIVE MG/DL
PSA SERPL-MCNC: 2.12 NG/ML (ref 0–4)
RBC # BLD AUTO: 5.06 10*6/MM3 (ref 4.14–5.8)
RBC # UR STRIP: NEGATIVE /UL
SP GR UR: 1.02 (ref 1–1.03)
UROBILINOGEN UR QL: NORMAL
WBC NRBC COR # BLD: 6.04 10*3/MM3 (ref 3.4–10.8)

## 2019-04-18 PROCEDURE — 36415 COLL VENOUS BLD VENIPUNCTURE: CPT | Performed by: INTERNAL MEDICINE

## 2019-04-18 PROCEDURE — 86803 HEPATITIS C AB TEST: CPT | Performed by: INTERNAL MEDICINE

## 2019-04-18 PROCEDURE — 80053 COMPREHEN METABOLIC PANEL: CPT | Performed by: INTERNAL MEDICINE

## 2019-04-18 PROCEDURE — G0103 PSA SCREENING: HCPCS | Performed by: INTERNAL MEDICINE

## 2019-04-18 PROCEDURE — 80061 LIPID PANEL: CPT | Performed by: INTERNAL MEDICINE

## 2019-04-18 PROCEDURE — 84443 ASSAY THYROID STIM HORMONE: CPT | Performed by: INTERNAL MEDICINE

## 2019-04-18 PROCEDURE — 85025 COMPLETE CBC W/AUTO DIFF WBC: CPT | Performed by: INTERNAL MEDICINE

## 2019-04-18 PROCEDURE — 81003 URINALYSIS AUTO W/O SCOPE: CPT | Performed by: INTERNAL MEDICINE

## 2019-04-18 PROCEDURE — 99396 PREV VISIT EST AGE 40-64: CPT | Performed by: INTERNAL MEDICINE

## 2019-04-18 RX ORDER — FLUOXETINE 20 MG/1
30 TABLET, FILM COATED ORAL DAILY
Qty: 45 TABLET | Refills: 2 | Status: SHIPPED | OUTPATIENT
Start: 2019-04-18 | End: 2019-08-24 | Stop reason: SDUPTHER

## 2019-04-18 RX ORDER — ASPIRIN 325 MG
325 TABLET ORAL DAILY
COMMUNITY
End: 2020-02-24 | Stop reason: ALTCHOICE

## 2019-04-18 NOTE — PROGRESS NOTES
Chief Complaint   Patient presents with   • Annual Exam       History of Present Illness      The patient presents for an established patient physical examination and health maintenance visit.    The patient presents for follow-up of anxiety. He reports currently having anxiety symptoms. He is not having panic attacks. His energy level is good. He denies agorophobia. He sleeps well. He is currently taking a medication. He states that his current anxiety symptoms are stable. The current medication regimen consists of fluoxetine and Wellbutrin XL. The patient denies having medication side effects including nausea, headaches, anxiety, increased depression or fatigue.    The patient presents for a follow-up related to hyperlipidemia. He is following a low fat diet. He reports that he is exercising. He is taking atorvastatin. The patient is not taking his medication as prescribed. He takes the medication infrequently. He reports no medication side effects, including muscle cramps, abdominal pain, headaches or weakness. He denies orthopnea, paroxysmal nocturnal dyspnea or dyspnea on exertion.    Review of Systems    CONSTITUTIONAL- Denies Unexplained Weight Loss, Fever, Chills, Sweats, Weakness or Malaise.    NECK- Denies Decreased Range of Motion, Stiffness, Thyroid Nodules, Enlarged Lymph Nodes or Goiter.    EYES- Denies Eye Pain, Eye Drainage, Eye Redness, Eye Discharge, Decreased Vision, Visual Disturbance, Diplopia, Visual Loss or Swollen Eyelid.    HENT- Denies Nasal Discharge, Sore Throat, Ear Pain, Ear Drainage, Headache, Sinus Pain, Nasal Congestion, Decreased Hearing or Tinnitus.    PULMONARY- Denies Wheezing, Dyspnea, Sputum Production, Cough, Hemoptysis or Pleuritic Chest Pain.    GASTROINTESTINAL- Denies Abdominal Pain, Nausea, Vomiting, Diarrhea, Blood per Rectum, Constipation or Heartburn.    GENITOURINARY- Denies Penile Discharge, Erectile Dysfunction, Testicular Mass, Testicular Pain, Hernia, Nocturia,  Decreased Urine Stream, Incomplete Voiding, Urinary Frequency, Urinary Urgency, Dysuria or Hematuria.    CARDIOVASCULAR- Denies Chest Pain, Claudication, Edema, Syncope, Palpitations or Irregular Heart Beat.    ENDOCRINE- Denies Cold Intolerance, Depression, Hair Loss, Heat Intolerance, Memory Loss, Polydypsia, Polyphagia, Polyuria or Weight Gain.    NEUROLOGIC- Denies Seizures, Visual Changes, Confusion or Excessive Daytime Sleepiness.    MUSCULOSKELETAL- Denies Joint Pain, Joint Stiffness, Decreased Range of Motion, Joint Swelling or Erythema of Joints.    INTEGUMENTARY- Denies Rash, Lumps, Sores, Itching, Dryness, Color Change, Changes in Hair, Brittle Nails, Discolored Nails, Thickened Nails, Growths or Alopecia.    Medications      Current Outpatient Medications:   •  aspirin 325 MG tablet, Take 325 mg by mouth Daily., Disp: , Rfl:   •  buPROPion XL (WELLBUTRIN XL) 300 MG 24 hr tablet, TAKE 1 TABLET DAILY, Disp: 90 tablet, Rfl: 1  •  FLUoxetine (PROzac) 20 MG tablet, TAKE 1 TABLET DAILY, Disp: 90 tablet, Rfl: 1  •  HYDROcodone-acetaminophen (NORCO)  MG per tablet, Take 1 tablet by mouth 2 (Two) Times a Day As Needed for Moderate Pain ., Disp: 50 tablet, Rfl: 0  •  meclizine 25 MG chewable tablet chewable tablet, Chew 1 tablet 3 (Three) Times a Day As Needed (dizziness)., Disp: 21 each, Rfl: 0  •  meloxicam (MOBIC) 15 MG tablet, Take 1 tablet by mouth Daily., Disp: 30 tablet, Rfl: 6  •  promethazine (PHENERGAN) 25 MG tablet, Take 1 tablet by mouth Every 8 (Eight) Hours As Needed for Nausea or Vomiting., Disp: 21 tablet, Rfl: 0  •  atorvastatin (LIPITOR) 20 MG tablet, TAKE 1 TABLET DAILY, Disp: 90 tablet, Rfl: 3  •  gabapentin (NEURONTIN) 300 MG capsule, Take 1 capsule by mouth 3 (Three) Times a Day., Disp: 90 capsule, Rfl: 0     Allergies    No Known Allergies    Problem List    Patient Active Problem List   Diagnosis   • Anxiety   • PFO (patent foramen ovale)   • History of CVA (cerebrovascular accident)  "  • Obesity   • Concentration deficit   • Microangiopathy (CMS/HCC)       Medications, Allergies, Problems List and Past History were reviewed and updated.    Physical Examination    /84 (BP Location: Left arm, Patient Position: Sitting, Cuff Size: Adult)   Pulse 72   Temp 97.8 °F (36.6 °C) (Temporal)   Resp 16   Ht 170.8 cm (67.25\")   Wt 98 kg (216 lb)   BMI 33.58 kg/m²       HEENT: Head- Normocephalic Atraumatic. Facies- Within normal limits. Pinnas- Normal texture and shape bilaterally. Canals- Normal bilaterally. TMs- Normal bilaterally. Nares- Patent bilaterally. Nasal Septum- is normal. There is no tenderness to palpation over the frontal or maxillary sinuses. Lids- Normal bilaterally. Conjunctiva- Clear bilaterally. Sclera- Anicteric bilaterally. Oropharynx- Moist with no lesions. Tonsils- No enlargement, erythema or exudate.    Neck: Thyroid- non enlarged, symmetric and has no nodules. No bruits are detected. ROM- Normal Range of Motion with no rigidity.    Lungs: Auscultation- Clear to auscultation bilaterally. There are no retractions, clubbing or cyanosis. The Expiratory to Inspiratory ratio is equal.    Lymph Nodes: Cervical- no enlarged lymph nodes noted. Clavicular- Deferred. Axillary- Deferred. Inguinal- Deferred.    Cardiovascular: There are no carotid bruits. Heart- Normal Rate with Regular rhythm and no murmurs. There are no gallops. There are no rubs. In the lower extremities there is no edema. The upper extremities do not have edema.    Abdomen: Soft, benign, non-tender with no masses, hernias, organomegaly or scars.    Male Genitourinary: The penis is circumcised with testicles found in the scrotum bilaterally. Testicular Size is normal bilaterally. The penis has no anatomic abnormalities.    Rectal: reveals normal external sphincter tone with no external lesions.    Prostate: The prostate gland is symmetric and smooth with no nodularity.    Dermatologic: The patient has no worrisome " or suspicious skin lesions noted.    Impression and Assessment    Normal Physical Examination.    Encounter for Screening for Malignant Neoplasm of the Prostate.    Anxiety Disorder Unspecified.    Hyperlipidemia.    Plan    Hyperlipidemia Plan: The patient was instructed to exercise daily, eat a low fat diet and continue his medications.    Anxiety Disorder Unspecified Plan: The medications were changed as noted.    Counseling was provided regarding: Adequate Aerobic Exercise, Dental Visits, Flossing Teeth and Heart Healthy Diet.    The following was ordered for screening and health maintenance: CBC w Automated Diff, Hepatitis C Antibody, PSA, TSH and U/A.       Immunizations Ordered and Administered: None.    Koffi was seen today for annual exam.    Diagnoses and all orders for this visit:    Physical exam  -     CBC & Differential  -     POC Urinalysis Dipstick, Automated  -     Hepatitis C Antibody    Hyperlipidemia, unspecified hyperlipidemia type  -     Comprehensive Metabolic Panel  -     Lipid Panel    Anxiety  -     TSH  -     FLUoxetine (PROzac) 20 MG tablet; Take 1.5 tablets by mouth Daily.    Prostate cancer screening  -     PSA Screen      Medications Discontinued During This Encounter   Medication Reason   • FLUoxetine (PROzac) 20 MG tablet          Return to Office    The patient was instructed to return for follow-up in 6 months. Next Visit: Follow-up.    The patient was instructed to return sooner if the condition changes, worsens, or does not resolve.

## 2019-04-19 LAB
ALBUMIN SERPL-MCNC: 4.6 G/DL (ref 3.5–5.2)
ALBUMIN/GLOB SERPL: 1.8 G/DL
ALP SERPL-CCNC: 60 U/L (ref 39–117)
ALT SERPL W P-5'-P-CCNC: 22 U/L (ref 1–41)
ANION GAP SERPL CALCULATED.3IONS-SCNC: 14.2 MMOL/L
AST SERPL-CCNC: 25 U/L (ref 1–40)
BILIRUB SERPL-MCNC: 0.3 MG/DL (ref 0.2–1.2)
BUN BLD-MCNC: 24 MG/DL (ref 6–20)
BUN/CREAT SERPL: 22.9 (ref 7–25)
CALCIUM SPEC-SCNC: 9.1 MG/DL (ref 8.6–10.5)
CHLORIDE SERPL-SCNC: 104 MMOL/L (ref 98–107)
CHOLEST SERPL-MCNC: 205 MG/DL (ref 0–200)
CO2 SERPL-SCNC: 21.8 MMOL/L (ref 22–29)
CREAT BLD-MCNC: 1.05 MG/DL (ref 0.76–1.27)
GFR SERPL CREATININE-BSD FRML MDRD: 73 ML/MIN/1.73
GLOBULIN UR ELPH-MCNC: 2.5 GM/DL
GLUCOSE BLD-MCNC: 91 MG/DL (ref 65–99)
HDLC SERPL-MCNC: 52 MG/DL (ref 40–60)
LDLC SERPL CALC-MCNC: 135 MG/DL (ref 0–100)
LDLC/HDLC SERPL: 2.6 {RATIO}
POTASSIUM BLD-SCNC: 4.1 MMOL/L (ref 3.5–5.2)
PROT SERPL-MCNC: 7.1 G/DL (ref 6–8.5)
SODIUM BLD-SCNC: 140 MMOL/L (ref 136–145)
TRIGL SERPL-MCNC: 88 MG/DL (ref 0–150)
TSH SERPL DL<=0.05 MIU/L-ACNC: 2.11 MIU/ML (ref 0.27–4.2)
VLDLC SERPL-MCNC: 17.6 MG/DL (ref 5–40)

## 2019-08-24 DIAGNOSIS — F41.9 ANXIETY: ICD-10-CM

## 2019-08-26 RX ORDER — BUPROPION HYDROCHLORIDE 300 MG/1
TABLET ORAL
Qty: 90 TABLET | Refills: 0 | Status: SHIPPED | OUTPATIENT
Start: 2019-08-26 | End: 2019-11-18

## 2019-08-26 RX ORDER — FLUOXETINE 20 MG/1
TABLET, FILM COATED ORAL
Qty: 90 TABLET | Refills: 4 | Status: SHIPPED | OUTPATIENT
Start: 2019-08-26 | End: 2019-09-17

## 2019-09-17 ENCOUNTER — OFFICE VISIT (OUTPATIENT)
Dept: INTERNAL MEDICINE | Facility: CLINIC | Age: 59
End: 2019-09-17

## 2019-09-17 VITALS
SYSTOLIC BLOOD PRESSURE: 128 MMHG | HEART RATE: 78 BPM | RESPIRATION RATE: 16 BRPM | WEIGHT: 216.8 LBS | OXYGEN SATURATION: 99 % | BODY MASS INDEX: 34.03 KG/M2 | HEIGHT: 67 IN | TEMPERATURE: 97 F | DIASTOLIC BLOOD PRESSURE: 72 MMHG

## 2019-09-17 DIAGNOSIS — R53.83 LOW ENERGY: Primary | ICD-10-CM

## 2019-09-17 LAB
ALBUMIN SERPL-MCNC: 4.7 G/DL (ref 3.5–5.2)
ALBUMIN/GLOB SERPL: 1.8 G/DL
ALP SERPL-CCNC: 77 U/L (ref 39–117)
ALT SERPL W P-5'-P-CCNC: 18 U/L (ref 1–41)
ANION GAP SERPL CALCULATED.3IONS-SCNC: 10.5 MMOL/L (ref 5–15)
AST SERPL-CCNC: 20 U/L (ref 1–40)
BASOPHILS # BLD AUTO: 0.06 10*3/MM3 (ref 0–0.2)
BASOPHILS NFR BLD AUTO: 0.7 % (ref 0–1.5)
BILIRUB BLD-MCNC: NEGATIVE MG/DL
BILIRUB SERPL-MCNC: 0.2 MG/DL (ref 0.2–1.2)
BUN BLD-MCNC: 23 MG/DL (ref 6–20)
BUN/CREAT SERPL: 18.1 (ref 7–25)
CALCIUM SPEC-SCNC: 9.4 MG/DL (ref 8.6–10.5)
CHLORIDE SERPL-SCNC: 106 MMOL/L (ref 98–107)
CK MB SERPL-CCNC: 2.44 NG/ML
CK SERPL-CCNC: 167 U/L (ref 20–200)
CLARITY, POC: CLEAR
CO2 SERPL-SCNC: 26.5 MMOL/L (ref 22–29)
COLOR UR: YELLOW
CREAT BLD-MCNC: 1.27 MG/DL (ref 0.76–1.27)
D-LACTATE SERPL-SCNC: 0.9 MMOL/L (ref 0.5–2)
DEPRECATED RDW RBC AUTO: 39.5 FL (ref 37–54)
EOSINOPHIL # BLD AUTO: 0.17 10*3/MM3 (ref 0–0.4)
EOSINOPHIL NFR BLD AUTO: 2 % (ref 0.3–6.2)
ERYTHROCYTE [DISTWIDTH] IN BLOOD BY AUTOMATED COUNT: 12.2 % (ref 12.3–15.4)
EXPIRATION DATE: NORMAL
GFR SERPL CREATININE-BSD FRML MDRD: 59 ML/MIN/1.73
GLOBULIN UR ELPH-MCNC: 2.6 GM/DL
GLUCOSE BLD-MCNC: 86 MG/DL (ref 65–99)
GLUCOSE BLDC GLUCOMTR-MCNC: 88 MG/DL (ref 70–130)
GLUCOSE UR STRIP-MCNC: NEGATIVE MG/DL
HCT VFR BLD AUTO: 45.3 % (ref 37.5–51)
HETEROPH AB SER QL LA: NEGATIVE
HGB BLD-MCNC: 15.2 G/DL (ref 13–17.7)
IMM GRANULOCYTES # BLD AUTO: 0.04 10*3/MM3 (ref 0–0.05)
IMM GRANULOCYTES NFR BLD AUTO: 0.5 % (ref 0–0.5)
INTERNAL CONTROL: NORMAL
KETONES UR QL: NEGATIVE
LEUKOCYTE EST, POC: NEGATIVE
LYMPHOCYTES # BLD AUTO: 1.54 10*3/MM3 (ref 0.7–3.1)
LYMPHOCYTES NFR BLD AUTO: 18.4 % (ref 19.6–45.3)
Lab: NORMAL
MCH RBC QN AUTO: 29.9 PG (ref 26.6–33)
MCHC RBC AUTO-ENTMCNC: 33.6 G/DL (ref 31.5–35.7)
MCV RBC AUTO: 89 FL (ref 79–97)
MONOCYTES # BLD AUTO: 0.62 10*3/MM3 (ref 0.1–0.9)
MONOCYTES NFR BLD AUTO: 7.4 % (ref 5–12)
NEUTROPHILS # BLD AUTO: 5.94 10*3/MM3 (ref 1.7–7)
NEUTROPHILS NFR BLD AUTO: 71 % (ref 42.7–76)
NITRITE UR-MCNC: NEGATIVE MG/ML
NRBC BLD AUTO-RTO: 0 /100 WBC (ref 0–0.2)
PH UR: 5 [PH] (ref 5–8)
PLATELET # BLD AUTO: 202 10*3/MM3 (ref 140–450)
PMV BLD AUTO: 10.1 FL (ref 6–12)
POTASSIUM BLD-SCNC: 4.6 MMOL/L (ref 3.5–5.2)
PROT SERPL-MCNC: 7.3 G/DL (ref 6–8.5)
PROT UR STRIP-MCNC: NEGATIVE MG/DL
RBC # BLD AUTO: 5.09 10*6/MM3 (ref 4.14–5.8)
RBC # UR STRIP: NEGATIVE /UL
SODIUM BLD-SCNC: 143 MMOL/L (ref 136–145)
SP GR UR: 1.02 (ref 1–1.03)
TROPONIN T SERPL-MCNC: <0.01 NG/ML (ref 0–0.03)
UROBILINOGEN UR QL: NORMAL
WBC NRBC COR # BLD: 8.37 10*3/MM3 (ref 3.4–10.8)

## 2019-09-17 PROCEDURE — 85025 COMPLETE CBC W/AUTO DIFF WBC: CPT | Performed by: NURSE PRACTITIONER

## 2019-09-17 PROCEDURE — 99213 OFFICE O/P EST LOW 20 MIN: CPT | Performed by: NURSE PRACTITIONER

## 2019-09-17 PROCEDURE — 81003 URINALYSIS AUTO W/O SCOPE: CPT | Performed by: NURSE PRACTITIONER

## 2019-09-17 PROCEDURE — 86308 HETEROPHILE ANTIBODY SCREEN: CPT | Performed by: NURSE PRACTITIONER

## 2019-09-17 PROCEDURE — 82962 GLUCOSE BLOOD TEST: CPT | Performed by: NURSE PRACTITIONER

## 2019-09-17 PROCEDURE — 93000 ELECTROCARDIOGRAM COMPLETE: CPT | Performed by: NURSE PRACTITIONER

## 2019-09-17 PROCEDURE — G0432 EIA HIV-1/HIV-2 SCREEN: HCPCS | Performed by: NURSE PRACTITIONER

## 2019-09-17 PROCEDURE — 83605 ASSAY OF LACTIC ACID: CPT | Performed by: NURSE PRACTITIONER

## 2019-09-17 PROCEDURE — 84484 ASSAY OF TROPONIN QUANT: CPT | Performed by: NURSE PRACTITIONER

## 2019-09-17 PROCEDURE — 83874 ASSAY OF MYOGLOBIN: CPT | Performed by: NURSE PRACTITIONER

## 2019-09-17 PROCEDURE — 82550 ASSAY OF CK (CPK): CPT | Performed by: NURSE PRACTITIONER

## 2019-09-17 PROCEDURE — 80053 COMPREHEN METABOLIC PANEL: CPT | Performed by: NURSE PRACTITIONER

## 2019-09-17 PROCEDURE — 82553 CREATINE MB FRACTION: CPT | Performed by: NURSE PRACTITIONER

## 2019-09-17 NOTE — PROGRESS NOTES
Subjective:    Koffi Melo is a 58 y.o. male.     Chief Complaint   Patient presents with   • Fatigue   • Low energy   • Generalized Body Aches       History of Present Illness   Patient complains of fatigue, low energy and body aches for 3 weeks. He feels clammy at times and has been sleeping more than usual. He states he feels like he has a virus that will not end and was dizzy on Job 9/15/2019. He took a phenergan and applied a cool cloth and this made him feel better. He has had adequate food and fluids. He works outside in heat daily. He reports adequate urine output and normal bowel elimination. No recent travel or known ill contacts. No changes with medication. No fever or rashes. No respiratory symptoms.     Current Outpatient Medications:   •  aspirin 325 MG tablet, Take 325 mg by mouth Daily., Disp: , Rfl:   •  buPROPion XL (WELLBUTRIN XL) 300 MG 24 hr tablet, TAKE 1 TABLET DAILY, Disp: 90 tablet, Rfl: 0  •  meloxicam (MOBIC) 15 MG tablet, Take 1 tablet by mouth Daily., Disp: 30 tablet, Rfl: 6  •  promethazine (PHENERGAN) 25 MG tablet, Take 1 tablet by mouth Every 8 (Eight) Hours As Needed for Nausea or Vomiting., Disp: 21 tablet, Rfl: 0     The following portions of the patient's history were reviewed and updated as appropriate: allergies, current medications, past family history, past medical history, past social history, past surgical history and problem list.    Review of Systems   Constitutional: Positive for fatigue. Negative for chills and fever.   HENT: Negative for congestion, dental problem, mouth sores, nosebleeds, postnasal drip, rhinorrhea, sinus pressure, sinus pain, sneezing and sore throat.    Eyes: Negative for pain, discharge, redness and itching.   Respiratory: Negative for cough, shortness of breath and wheezing.    Cardiovascular: Negative for chest pain, palpitations and leg swelling.   Gastrointestinal: Negative for abdominal distention, abdominal pain, blood in stool,  "diarrhea, nausea and vomiting.   Endocrine: Negative for cold intolerance, heat intolerance, polydipsia and polyuria.   Genitourinary: Negative for difficulty urinating, dysuria, frequency, hematuria and urgency.   Musculoskeletal: Positive for myalgias. Negative for arthralgias, gait problem and joint swelling.   Skin: Negative for color change, rash and wound.   Neurological: Negative for dizziness, syncope, weakness, light-headedness, numbness and headaches.   Hematological: Negative for adenopathy. Does not bruise/bleed easily.       Objective:    /72   Pulse 78   Temp 97 °F (36.1 °C) (Temporal)   Resp 16   Ht 170.8 cm (67.25\")   Wt 98.3 kg (216 lb 12.8 oz)   SpO2 99%   BMI 33.70 kg/m²     Physical Exam   Constitutional: He appears well-developed and well-nourished.   Neck: Normal range of motion. Neck supple. Carotid bruit is not present. No thyromegaly present.   Cardiovascular: Normal rate, regular rhythm, normal heart sounds and intact distal pulses. Exam reveals no gallop and no friction rub.   No murmur heard.  No peripheral edema.   Pulmonary/Chest: Effort normal and breath sounds normal.   Abdominal: Soft. Bowel sounds are normal. He exhibits no distension, no abdominal bruit and no mass. There is no hepatosplenomegaly. There is no tenderness.   Psychiatric: He has a normal mood and affect.   Nursing note and vitals reviewed.      Assessment/Plan:    Koffi was seen today for fatigue, low energy and generalized body aches.    Diagnoses and all orders for this visit:    Low energy  -     ECG 12 Lead  -     POCT Glucose  -     POC Infectious Mononucleosis Antibody  -     POC Urinalysis Dipstick, Automated  -     Troponin  -     CK  -     CK-MB  -     Myoglobin, Serum  -     CBC & Differential  -     Comprehensive Metabolic Panel  -     HIV-1 / O / 2 Ag / Antibody 4th Generation  -     Lactic Acid, Plasma      ECG 12 Lead  Date/Time: 9/17/2019 4:20 PM  Performed by: Vita Taveras, " JENNIFER  Authorized by: Vita Taveras APRN   Comparison: compared with previous ECG from 7/27/2016  Similar to previous ECG  Rhythm: sinus rhythm  Rate: normal  Conduction: conduction normal  QRS axis: normal    Clinical impression: normal ECG        Discussed normal EKG and urine results. Discussed to drink adequate fluids while working with hot weather. If symptoms progress in a rapid manner, proceed to emergency room, otherwise return to see PCP. Increase rest. Tylenol as needed for body aches.     Return in about 1 week (around 9/24/2019), or if symptoms worsen or fail to improve, f/u PCP.

## 2019-09-18 LAB
HIV1+2 AB SER QL: NORMAL
MYOGLOBIN SERPL-MCNC: 26.8 NG/ML (ref 28–72)

## 2019-09-24 ENCOUNTER — OFFICE VISIT (OUTPATIENT)
Dept: INTERNAL MEDICINE | Facility: CLINIC | Age: 59
End: 2019-09-24

## 2019-09-24 VITALS
BODY MASS INDEX: 34.37 KG/M2 | OXYGEN SATURATION: 99 % | HEIGHT: 67 IN | DIASTOLIC BLOOD PRESSURE: 74 MMHG | SYSTOLIC BLOOD PRESSURE: 130 MMHG | HEART RATE: 87 BPM | WEIGHT: 219 LBS | RESPIRATION RATE: 16 BRPM

## 2019-09-24 DIAGNOSIS — F41.9 ANXIETY: ICD-10-CM

## 2019-09-24 DIAGNOSIS — E78.5 HYPERLIPIDEMIA, UNSPECIFIED HYPERLIPIDEMIA TYPE: ICD-10-CM

## 2019-09-24 DIAGNOSIS — R53.83 FATIGUE, UNSPECIFIED TYPE: Primary | ICD-10-CM

## 2019-09-24 LAB
ANION GAP SERPL CALCULATED.3IONS-SCNC: 11.4 MMOL/L (ref 5–15)
BUN BLD-MCNC: 18 MG/DL (ref 6–20)
BUN/CREAT SERPL: 16.5 (ref 7–25)
CALCIUM SPEC-SCNC: 8.9 MG/DL (ref 8.6–10.5)
CHLORIDE SERPL-SCNC: 107 MMOL/L (ref 98–107)
CHOLEST SERPL-MCNC: 199 MG/DL (ref 0–200)
CO2 SERPL-SCNC: 25.6 MMOL/L (ref 22–29)
CREAT BLD-MCNC: 1.09 MG/DL (ref 0.76–1.27)
GFR SERPL CREATININE-BSD FRML MDRD: 70 ML/MIN/1.73
GLUCOSE BLD-MCNC: 84 MG/DL (ref 65–99)
HDLC SERPL-MCNC: 44 MG/DL (ref 40–60)
LDLC SERPL CALC-MCNC: 136 MG/DL (ref 0–100)
LDLC/HDLC SERPL: 3.1 {RATIO}
POTASSIUM BLD-SCNC: 4.2 MMOL/L (ref 3.5–5.2)
SODIUM BLD-SCNC: 144 MMOL/L (ref 136–145)
T3FREE SERPL-MCNC: 2.83 PG/ML (ref 2–4.4)
T4 FREE SERPL-MCNC: 1.23 NG/DL (ref 0.93–1.7)
TRIGL SERPL-MCNC: 93 MG/DL (ref 0–150)
TSH SERPL DL<=0.05 MIU/L-ACNC: 1.63 UIU/ML (ref 0.27–4.2)
VLDLC SERPL-MCNC: 18.6 MG/DL (ref 5–40)

## 2019-09-24 PROCEDURE — 84403 ASSAY OF TOTAL TESTOSTERONE: CPT | Performed by: INTERNAL MEDICINE

## 2019-09-24 PROCEDURE — 36415 COLL VENOUS BLD VENIPUNCTURE: CPT | Performed by: INTERNAL MEDICINE

## 2019-09-24 PROCEDURE — 84402 ASSAY OF FREE TESTOSTERONE: CPT | Performed by: INTERNAL MEDICINE

## 2019-09-24 PROCEDURE — 84439 ASSAY OF FREE THYROXINE: CPT | Performed by: INTERNAL MEDICINE

## 2019-09-24 PROCEDURE — 84443 ASSAY THYROID STIM HORMONE: CPT | Performed by: INTERNAL MEDICINE

## 2019-09-24 PROCEDURE — 80048 BASIC METABOLIC PNL TOTAL CA: CPT | Performed by: INTERNAL MEDICINE

## 2019-09-24 PROCEDURE — 99214 OFFICE O/P EST MOD 30 MIN: CPT | Performed by: INTERNAL MEDICINE

## 2019-09-24 PROCEDURE — 80061 LIPID PANEL: CPT | Performed by: INTERNAL MEDICINE

## 2019-09-24 PROCEDURE — 84481 FREE ASSAY (FT-3): CPT | Performed by: INTERNAL MEDICINE

## 2019-09-24 NOTE — PROGRESS NOTES
Chief Complaint   Patient presents with   • Anxiety   • Hyperlipidemia       History of Present Illness      The patient has complaints of being fatigued. He describes his symptoms as decreased energy and the symptoms have been present for six months.       He denies tearfulness, loneliness, hopelessness, anxiety or suicidal thoughts.       The patient denies a dry cough, a wet cough, wheezing, fever, facial pain, a headache, eye drainage, ear pain, ear drainage, nausea, vomiting, diarrhea, sore throat, abdominal pain, nasal discharge, decreased appetite, chills, rectal bleeding, night sweats or myalgias. There are no symptoms of heat intolerance, cold intolerance, excessive hair loss or constipation. The patient denies polyuria, polydypsia or polyphagia. There are no known exposures to mononucleosis. The patient does exercise regularly.    The patient falls asleep easily and typically does not awaken at night. The patient does not snore and denies gasping or stopping breathing at night. He feels well rested during the day and he denies falling asleep while watching television, while driving or during conversation.       The patient presents for a follow-up related to hyperlipidemia. He is following a low fat diet. He reports that he is exercising. He is not taking medication for hyperlipidemia. He denies chest pain, shortness of breath, orthopnea, paroxysmal nocturnal dyspnea, dyspnea on exertion, edema, palpitations or syncope.    The patient presents for follow-up of anxiety. He denies currently having anxiety symptoms. He is not having panic attacks. His energy level is very low. He denies agorophobia. He sleeps well. He is currently taking a medication. He states that his current anxiety symptoms are stable. The current medication regimen consists of Wellbutrin XL. The patient denies having medication side effects including nausea, headaches, anxiety, increased depression or fatigue.    Review of  "Systems    CONSTITUTIONAL- Denies Unexplained Weight Loss, Sweats or Weakness.    PULMONARY- Denies Sputum Production, Cough, Hemoptysis or Pleuritic Chest Pain.    CARDIOVASCULAR- Denies Claudication or Irregular Heart Beat.    Medications      Current Outpatient Medications:   •  aspirin 325 MG tablet, Take 325 mg by mouth Daily., Disp: , Rfl:   •  buPROPion XL (WELLBUTRIN XL) 300 MG 24 hr tablet, TAKE 1 TABLET DAILY, Disp: 90 tablet, Rfl: 0  •  meloxicam (MOBIC) 15 MG tablet, Take 1 tablet by mouth Daily., Disp: 30 tablet, Rfl: 6  •  promethazine (PHENERGAN) 25 MG tablet, Take 1 tablet by mouth Every 8 (Eight) Hours As Needed for Nausea or Vomiting., Disp: 21 tablet, Rfl: 0     Allergies    No Known Allergies    Problem List    Patient Active Problem List   Diagnosis   • Anxiety   • PFO (patent foramen ovale)   • History of CVA (cerebrovascular accident)   • Obesity   • Concentration deficit   • Microangiopathy (CMS/HCC)       Medications, Allergies, Problems List and Past History were reviewed and updated.    Physical Examination    /74   Pulse 87   Resp 16   Ht 170.8 cm (67.25\")   Wt 99.3 kg (219 lb)   SpO2 99%   BMI 34.05 kg/m²     HEENT: Facies- Within normal limits. Lids- Normal bilaterally. Conjunctiva- Clear bilaterally. Sclera- Anicteric bilaterally.    Neck: Thyroid- non enlarged, symmetric and has no nodules. No bruits are detected. ROM- Normal Range of Motion with no rigidity.    Lungs: Auscultation- Clear to auscultation bilaterally. There are no retractions, clubbing or cyanosis. The Expiratory to Inspiratory ratio is equal.    Lymph Nodes: Cervical- no enlarged lymph nodes noted.    Cardiovascular: There are no carotid bruits. Heart- Normal Rate with Regular rhythm and no murmurs. There are no gallops. There are no rubs. In the lower extremities there is no edema. The upper extremities do not have edema.    Abdomen: Soft, benign, non-tender with no masses, hernias, organomegaly or " scars.    Impression and Assessment    Fatigue.    Hyperlipidemia.    Anxiety Disorder Unspecified.    Plan    Hyperlipidemia Plan: The patient was instructed to exercise daily, eat a low fat diet and continue his medications.    Fatigue Plan: The current plan was continued.    Anxiety Disorder Unspecified Plan: The current plan was continued.    Koffi was seen today for anxiety and hyperlipidemia.    Diagnoses and all orders for this visit:    Fatigue, unspecified type  -     TSH  -     T4, Free  -     T3, Free  -     Testosterone, Free, Total  -     Basic Metabolic Panel    Hyperlipidemia, unspecified hyperlipidemia type  -     Lipid Panel    Anxiety        Return to Office    The patient was instructed to return for follow-up in 6 months.    The patient was instructed to return sooner if the condition changes, worsens, or does not resolve.

## 2019-09-27 LAB
TESTOST FREE SERPL-MCNC: 6 PG/ML (ref 7.2–24)
TESTOST SERPL-MCNC: 437 NG/DL (ref 264–916)

## 2019-10-28 RX ORDER — BUPROPION HYDROCHLORIDE 300 MG/1
TABLET ORAL
Qty: 15 TABLET | Refills: 0 | Status: SHIPPED | OUTPATIENT
Start: 2019-10-28 | End: 2019-11-18

## 2019-11-04 ENCOUNTER — TELEPHONE (OUTPATIENT)
Dept: INTERNAL MEDICINE | Facility: CLINIC | Age: 59
End: 2019-11-04

## 2019-11-04 NOTE — TELEPHONE ENCOUNTER
LMVM for pt to please return call.  Ofc. # given.     Need to verify medication needing refill.  Do not have fluoxetine on med list.  Per med list, we have him on Bupropion.

## 2019-11-04 NOTE — TELEPHONE ENCOUNTER
Please find out if he is definitely taking this.  I have Wellbutrin on the med list?  Please clarify and let me know.  Daniel Salazar MD  4:03 PM  11/04/19

## 2019-11-06 NOTE — TELEPHONE ENCOUNTER
S/W pt, states he is on Fluoxetine and Wellbutrin.  States he received a full bottle of 90 Fluoxetine in the mail yesterday but that label states to take 1 tablet 20 mg daily and that he had previously discussed with Dr Nowak at an appt and Dr Nowak had told him he could increase to 30 mg daily so he has been taking 1 1/2 tablets daily.  States he will run out early by doing this.  States he feels that it has really helped him. Expl will check with Dr Nowak tomorrow once back in the ofc and will let him know.  Verb great apprec.

## 2019-11-07 NOTE — TELEPHONE ENCOUNTER
Jil-  Increase his prescription to his current dosage and call in a new prescription and update medication list.  Daniel Salazar MD  7:34 AM  11/07/19

## 2019-11-11 NOTE — TELEPHONE ENCOUNTER
LMVM for pt to please return call.  Ofc # given.      Need to inform that Dr Nowak did approve the increase dose and see what pharmacy he wants rx sent to.

## 2019-11-12 NOTE — TELEPHONE ENCOUNTER
LVM informing patient that we need to know what pharmacy he would like Rx with new directions sent to. Provided office number.

## 2019-11-18 RX ORDER — BUPROPION HYDROCHLORIDE 300 MG/1
300 TABLET ORAL DAILY
Qty: 90 TABLET | Refills: 1 | Status: SHIPPED | OUTPATIENT
Start: 2019-11-18 | End: 2020-07-22

## 2019-11-18 RX ORDER — FLUOXETINE 20 MG/1
30 TABLET, FILM COATED ORAL DAILY
Qty: 135 TABLET | Refills: 1 | Status: SHIPPED | OUTPATIENT
Start: 2019-11-18 | End: 2020-06-05

## 2019-11-18 NOTE — TELEPHONE ENCOUNTER
S/W pt, informed that Dr Nowak said to continue increased dose as discussed at appt.  Expl will send new rx but need to know pharmacy.  States rxs need to go to [facility].  Expl will send now.  Verb apprec.       Rxs sent via erx.

## 2020-02-24 ENCOUNTER — OFFICE VISIT (OUTPATIENT)
Dept: INTERNAL MEDICINE | Facility: CLINIC | Age: 60
End: 2020-02-24

## 2020-02-24 VITALS
SYSTOLIC BLOOD PRESSURE: 118 MMHG | TEMPERATURE: 98.1 F | BODY MASS INDEX: 32.65 KG/M2 | HEART RATE: 72 BPM | WEIGHT: 210 LBS | RESPIRATION RATE: 18 BRPM | DIASTOLIC BLOOD PRESSURE: 72 MMHG

## 2020-02-24 DIAGNOSIS — J20.9 ACUTE BRONCHITIS, UNSPECIFIED ORGANISM: Primary | ICD-10-CM

## 2020-02-24 PROCEDURE — 99214 OFFICE O/P EST MOD 30 MIN: CPT | Performed by: INTERNAL MEDICINE

## 2020-02-24 RX ORDER — BENZONATATE 200 MG/1
200 CAPSULE ORAL 3 TIMES DAILY PRN
Qty: 30 CAPSULE | Refills: 0 | Status: SHIPPED | OUTPATIENT
Start: 2020-02-24 | End: 2020-03-24

## 2020-02-24 RX ORDER — ASPIRIN 81 MG/1
81 TABLET ORAL DAILY
COMMUNITY
End: 2023-03-28

## 2020-02-24 RX ORDER — AMOXICILLIN AND CLAVULANATE POTASSIUM 875; 125 MG/1; MG/1
1 TABLET, FILM COATED ORAL 2 TIMES DAILY
Qty: 20 TABLET | Refills: 0 | Status: SHIPPED | OUTPATIENT
Start: 2020-02-24 | End: 2020-03-24

## 2020-02-24 RX ORDER — ALBUTEROL SULFATE 90 UG/1
2 AEROSOL, METERED RESPIRATORY (INHALATION) EVERY 4 HOURS PRN
Qty: 1 INHALER | Refills: 0 | Status: SHIPPED | OUTPATIENT
Start: 2020-02-24 | End: 2021-06-17

## 2020-02-24 NOTE — PROGRESS NOTES
Chief Complaint   Patient presents with   • Cough   • URI       History of Present Illness    The patient presents with a 3 day history of a productive cough. The sputum is thick, yellow and tan. There are no other associated symptoms, including wheezing, fever, facial pain, a headache, eye drainage, ear pain, ear drainage, nasal congestion, rhinorrhea, nausea, vomiting, diarrhea, chills, decreased appetite, abdominal pain, sore throat, myalgias or a rash. The patient has not had hemoptysis. The patient is not exposed to cigarette smoke. The patient has not tried anything for treatment of this illness.    Review of Systems    CONSTITUTIONAL- Denies Unexplained Weight Loss, Sweats, Fatigue, Weakness or Malaise.    HENT- Denies Sinus Pain, Decreased Hearing or Tinnitus.    GASTROINTESTINAL- Denies Blood per Rectum, Constipation or Heartburn.    PULMONARY- Reports: Sputum Production and Cough. Denies: Dyspnea or Pleuritic Chest Pain.    CARDIOVASCULAR- Denies Chest Pain, Claudication, Edema, Syncope, Palpitations or Irregular Heart Beat.    Medications      Current Outpatient Medications:   •  aspirin 81 MG EC tablet, Take 81 mg by mouth Daily., Disp: , Rfl:   •  buPROPion XL (WELLBUTRIN XL) 300 MG 24 hr tablet, Take 1 tablet by mouth Daily., Disp: 90 tablet, Rfl: 1  •  FLUoxetine (PROzac) 20 MG tablet, Take 1.5 tablets by mouth Daily., Disp: 135 tablet, Rfl: 1  •  meloxicam (MOBIC) 15 MG tablet, Take 1 tablet by mouth Daily., Disp: 30 tablet, Rfl: 6       Allergies    No Known Allergies    Problem List    Patient Active Problem List   Diagnosis   • Anxiety   • PFO (patent foramen ovale)   • History of CVA (cerebrovascular accident)   • Obesity   • Concentration deficit   • Microangiopathy (CMS/HCC)       Medications, Allergies, Problems List and Past History were reviewed and updated.    Physical Examination    /72 (BP Location: Right arm, Patient Position: Sitting, Cuff Size: Adult)   Pulse 72   Temp 98.1 °F  (36.7 °C) (Temporal)   Resp 18   Wt 95.3 kg (210 lb)   BMI 32.65 kg/m²     HEENT: Head- Normocephalic Atraumatic. Facies- Within normal limits. Pinnas- Normal texture and shape bilaterally. Canals- Normal bilaterally. TMs- Normal bilaterally. Nares- Patent bilaterally. Nasal Septum- is normal. There is no tenderness to palpation over the frontal or maxillary sinuses. Lids- Normal bilaterally. Conjunctiva- Clear bilaterally. Sclera- Anicteric bilaterally. Oropharynx- Moist with no lesions. Tonsils- No enlargement, erythema or exudate.    Neck: Thyroid- non enlarged, symmetric and has no nodules. No bruits are detected. ROM- Normal Range of Motion with no rigidity.    Lungs: Auscultation- Both lungs are have abnormal findings. The right lung has diffuse inspiratory and expiratory wheezes and coarse ronchi. The left lung has diffuse inspiratory and expiratory wheezes and coarse ronchi. There are no retractions, clubbing or cyanosis. The Expiratory to Inspiratory ratio is equal.    Lymph Nodes: Cervical- no enlarged lymph nodes noted.    Cardiovascular: There are no carotid bruits. Heart- Normal Rate with Regular rhythm and no murmurs. There are no gallops. There are no rubs. In the lower extremities there is no edema. The upper extremities do not have edema.    Abdomen: Soft, benign, non-tender with no masses, hernias, organomegaly or scars.    Impression and Assessment    Acute Bronchitis.    Plan    Acute Bronchitis Plan: Use over the counter acetaminophen for fevers or comfort. A cool mist humidifier was encouraged. He was encouraged to drink plenty of fluids. Adequate rest was advised. Medication will be added as noted below.    Koffi was seen today for cough and uri.    Diagnoses and all orders for this visit:    Acute bronchitis, unspecified organism  -     amoxicillin-clavulanate (AUGMENTIN) 875-125 MG per tablet; Take 1 tablet by mouth 2 (Two) Times a Day.  -     benzonatate (TESSALON) 200 MG capsule;  Take 1 capsule by mouth 3 (Three) Times a Day As Needed for Cough.  -     albuterol sulfate  (90 Base) MCG/ACT inhaler; Inhale 2 puffs Every 4 (Four) Hours As Needed for Wheezing.        Return to Office    The patient was instructed to return for follow-up at the next scheduled visit.    The patient was instructed to return sooner if the condition changes, worsens, or does not resolve.

## 2020-03-24 ENCOUNTER — OFFICE VISIT (OUTPATIENT)
Dept: INTERNAL MEDICINE | Facility: CLINIC | Age: 60
End: 2020-03-24

## 2020-03-24 VITALS
WEIGHT: 215 LBS | DIASTOLIC BLOOD PRESSURE: 68 MMHG | BODY MASS INDEX: 33.42 KG/M2 | SYSTOLIC BLOOD PRESSURE: 126 MMHG | HEART RATE: 64 BPM | TEMPERATURE: 97.9 F | RESPIRATION RATE: 16 BRPM

## 2020-03-24 DIAGNOSIS — F41.9 ANXIETY: ICD-10-CM

## 2020-03-24 DIAGNOSIS — K21.9 GASTROESOPHAGEAL REFLUX DISEASE WITHOUT ESOPHAGITIS: Primary | ICD-10-CM

## 2020-03-24 DIAGNOSIS — E78.5 HYPERLIPIDEMIA, UNSPECIFIED HYPERLIPIDEMIA TYPE: ICD-10-CM

## 2020-03-24 PROCEDURE — 99214 OFFICE O/P EST MOD 30 MIN: CPT | Performed by: INTERNAL MEDICINE

## 2020-03-24 RX ORDER — PANTOPRAZOLE SODIUM 40 MG/1
40 TABLET, DELAYED RELEASE ORAL DAILY
Qty: 30 TABLET | Refills: 2 | Status: SHIPPED | OUTPATIENT
Start: 2020-03-24 | End: 2021-01-20

## 2020-03-24 NOTE — PROGRESS NOTES
Chief Complaint   Patient presents with   • Follow-up     6 month follow up chronic medical problems       History of Present Illness    The patient presents for a follow-up related to GERD. The patient is not on medication for his gastroesophageal reflux. He reports a chronic cough but he denies abdominal pain, belching, chest pain, diarrhea, dysphagia, early satiety, heartburn, hoarseness, nausea, odynophagia, rectal bleeding, vomiting or weight loss. The GERD has no known aggravating factors.    The patient presents for follow-up of anxiety. He denies currently having anxiety symptoms. He is not having panic attacks. His energy level is good. He denies agorophobia. He sleeps well. He is currently taking a medication. He states that his current anxiety symptoms are stable. The current medication regimen consists of fluoxetine and Wellbutrin XL. The patient denies having medication side effects including nausea, headaches, anxiety, increased depression, anorgasmia or fatigue.    The patient presents for a follow-up related to hyperlipidemia. He is following a low fat diet. He reports that he is exercising. He is not taking medication for hyperlipidemia. He denies shortness of breath, orthopnea, paroxysmal nocturnal dyspnea, dyspnea on exertion, edema, palpitations or syncope.    Review of Systems    CONSTITUTIONAL- Denies Fever, Chills, Sweats, Weakness or Malaise.    PULMONARY- Denies Wheezing, Sputum Production, Cough, Hemoptysis or Pleuritic Chest Pain.    GASTROINTESTINAL- Denies Constipation.    CARDIOVASCULAR- Denies Claudication or Irregular Heart Beat.    Medications      Current Outpatient Medications:   •  albuterol sulfate  (90 Base) MCG/ACT inhaler, Inhale 2 puffs Every 4 (Four) Hours As Needed for Wheezing., Disp: 1 inhaler, Rfl: 0  •  aspirin 81 MG EC tablet, Take 81 mg by mouth Daily., Disp: , Rfl:   •  buPROPion XL (WELLBUTRIN XL) 300 MG 24 hr tablet, Take 1 tablet by mouth Daily., Disp: 90  tablet, Rfl: 1  •  FLUoxetine (PROzac) 20 MG tablet, Take 1.5 tablets by mouth Daily., Disp: 135 tablet, Rfl: 1  •  meloxicam (MOBIC) 15 MG tablet, Take 1 tablet by mouth Daily., Disp: 30 tablet, Rfl: 6  •  pantoprazole (PROTONIX) 40 MG EC tablet, Take 1 tablet by mouth Daily., Disp: 30 tablet, Rfl: 2     Allergies    No Known Allergies    Problem List    Patient Active Problem List   Diagnosis   • Anxiety   • PFO (patent foramen ovale)   • History of CVA (cerebrovascular accident)   • Obesity   • Concentration deficit   • Microangiopathy (CMS/HCC)       Medications, Allergies, Problems List and Past History were reviewed and updated.    Physical Examination    /68 (BP Location: Right arm, Patient Position: Sitting, Cuff Size: Adult)   Pulse 64   Temp 97.9 °F (36.6 °C) (Temporal)   Resp 16   Wt 97.5 kg (215 lb)   BMI 33.42 kg/m²     HEENT: Facies- Within normal limits. Lids- Normal bilaterally. Conjunctiva- Clear bilaterally. Sclera- Anicteric bilaterally.    Neck: Thyroid- non enlarged, symmetric and has no nodules. No bruits are detected.    Lungs: Auscultation- Clear to auscultation bilaterally. There are no retractions, clubbing or cyanosis. The Expiratory to Inspiratory ratio is equal.    Cardiovascular: There are no carotid bruits. Heart- Normal Rate with Regular rhythm and no murmurs. There are no gallops. There are no rubs. In the lower extremities there is no edema. The upper extremities do not have edema.    Abdomen: Soft, benign, non-tender with no masses, hernias, organomegaly or scars.    Impression and Assessment    Gastroesophageal Reflux Disease.    Anxiety Disorder Unspecified.    Hyperlipidemia.    Plan    Gastroesophageal Reflux Disease Plan: Medication will be added as noted below.    Hyperlipidemia Plan: The patient was instructed to exercise daily and eat a low fat diet.    Anxiety Disorder Unspecified Plan: The current plan was continued.    Koffi was seen today for  follow-up.    Diagnoses and all orders for this visit:    Gastroesophageal reflux disease without esophagitis  -     pantoprazole (PROTONIX) 40 MG EC tablet; Take 1 tablet by mouth Daily.    Anxiety    Hyperlipidemia, unspecified hyperlipidemia type        Return to Office    The patient was instructed to return for follow-up in 4 months.    The patient was instructed to return sooner if the condition changes, worsens, or does not resolve.

## 2020-04-02 ENCOUNTER — TELEPHONE (OUTPATIENT)
Dept: INTERNAL MEDICINE | Facility: CLINIC | Age: 60
End: 2020-04-02

## 2020-04-02 ENCOUNTER — NURSE TRIAGE (OUTPATIENT)
Dept: CALL CENTER | Facility: HOSPITAL | Age: 60
End: 2020-04-02

## 2020-04-02 NOTE — TELEPHONE ENCOUNTER
Patient called, stating he is having pain on his lower rt ear. Patient states this has been going on for 3-4 days. Patient wants to know what he should do.

## 2020-04-02 NOTE — TELEPHONE ENCOUNTER
Reason for Disposition  • COVID-19, questions about    Additional Information  • Negative: SEVERE difficulty breathing (e.g., struggling for each breath, speaks in single words)  • Negative: Difficult to awaken or acting confused (e.g., disoriented, slurred speech)  • Negative: Bluish (or gray) lips or face now  • Negative: Shock suspected (e.g., cold/pale/clammy skin, too weak to stand, low BP, rapid pulse)  • Negative: Sounds like a life-threatening emergency to the triager  • Negative: [1] COVID-19 suspected (e.g., cough, fever, shortness of breath) AND [2] public health department recommends testing  • Negative: [1] COVID-19 exposure AND [2] no symptoms  • Negative: COVID-19 and Breastfeeding, questions about  • Negative: SEVERE or constant chest pain (Exception: mild central chest pain, present only when coughing)  • Negative: MODERATE difficulty breathing (e.g., speaks in phrases, SOB even at rest, pulse 100-120)  • Negative: Patient sounds very sick or weak to the triager  • Negative: MILD difficulty breathing (e.g., minimal/no SOB at rest, SOB with walking, pulse <100)  • Negative: Chest pain  • Negative: Fever > 103 F (39.4 C)  • Negative: [1] Fever > 101 F (38.3 C) AND [2] age > 60  • Negative: [1] Fever > 100.0 F (37.8 C) AND [2] bedridden (e.g., nursing home patient, CVA, chronic illness, recovering from surgery)  • Negative: HIGH RISK patient (e.g., age > 64 years, diabetes, heart or lung disease, weak immune system)  • Negative: Fever present > 3 days (72 hours)  • Negative: [1] Fever returns after gone for over 24 hours AND [2] symptoms worse or not improved  • Negative: [1] Continuous (nonstop) coughing interferes with work or school AND [2] no improvement using cough treatment per protocol  • Negative: Cough present > 3 weeks  • Negative: 1] COVID-19 infection diagnosed or suspected AND [2] mild symptoms (fever, cough) AND [2] no trouble breathing or other complications    Answer Assessment -  "Initial Assessment Questions  1. COVID-19 DIAGNOSIS: \"Who made your Coronavirus (COVID-19) diagnosis?\" \"Was it confirmed by a positive lab test?\" If not diagnosed by a HCP, ask \"Are there lots of cases (community spread) where you live?\" (See public health department website, if unsure)    * MAJOR community spread: high number of cases; numbers of cases are increasing; many people hospitalized.    * MINOR community spread: low number of cases; not increasing; few or no people hospitalized       No diagnosis. 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 ONSET: \"When did the COVID-19 symptoms start?\"       Cough over a  month ago  3. WORST SYMPTOM: \"What is your worst symptom?\" (e.g., cough, fever, shortness of breath, muscle aches)      Pain behind Right ear is worst symptom, sharp pain on occasion, close to ear on bone, no redness or swelling. Afebrile, no shortness of breath or muscle aches.  4. COUGH: \"How bad is the cough?\"        Cough is not bad and is dry.  5. FEVER: \"Do you have a fever?\" If so, ask: \"What is your temperature, how was it measured, and when did it start?\"      afebrile  6. RESPIRATORY STATUS: \"Describe your breathing?\" (e.g., shortness of breath, wheezing, unable to speak)       No issues.  7. BETTER-SAME-WORSE: \"Are you getting better, staying the same or getting worse compared to yesterday?\"  If getting worse, ask, \"In what way?\"      Cough is better but ear pain is much worse.  8. HIGH RISK DISEASE: \"Do you have any chronic medical problems?\" (e.g., asthma, heart or lung disease, weak immune system, etc.)      No issues.  9. PREGNANCY: \"Is there any chance you are pregnant?\" \"When was your last menstrual period?\"      N/A  10. OTHER SYMPTOMS: \"Do you have any other symptoms?\"  (e.g., runny nose, headache, sore throat, loss of smell)        Significant headaches at times, no sore throat, runny nose of loss of smell or taste.    Protocols used: CORONAVIRUS (COVID-19) DIAGNOSED OR " SUSPECTED-ADULT-AH

## 2020-04-03 ENCOUNTER — OFFICE VISIT (OUTPATIENT)
Dept: INTERNAL MEDICINE | Facility: CLINIC | Age: 60
End: 2020-04-03

## 2020-04-03 VITALS
DIASTOLIC BLOOD PRESSURE: 88 MMHG | OXYGEN SATURATION: 96 % | BODY MASS INDEX: 33.93 KG/M2 | RESPIRATION RATE: 18 BRPM | SYSTOLIC BLOOD PRESSURE: 140 MMHG | TEMPERATURE: 97.2 F | HEART RATE: 86 BPM | WEIGHT: 218.25 LBS

## 2020-04-03 DIAGNOSIS — I88.9 LYMPHADENITIS: Primary | ICD-10-CM

## 2020-04-03 PROCEDURE — 99213 OFFICE O/P EST LOW 20 MIN: CPT | Performed by: INTERNAL MEDICINE

## 2020-04-03 RX ORDER — AMOXICILLIN AND CLAVULANATE POTASSIUM 875; 125 MG/1; MG/1
1 TABLET, FILM COATED ORAL 2 TIMES DAILY
Qty: 20 TABLET | Refills: 0 | Status: SHIPPED | OUTPATIENT
Start: 2020-04-03 | End: 2020-04-13

## 2020-04-03 NOTE — PROGRESS NOTES
Chief Complaint   Patient presents with   • Facial Pain     x1week behind right ear and down jaw line shoots a sharp pain        History of Present Illness    The patient presents with enlarged lymph nodes in the neck of 1 week duration. The lymph nodes are tender to palpation. The patient has no other associated symptoms. There are no exposures to Tuberculosis. The patient denies possible exposures to HIV. The patient denies insect bites. The patient denies skin breaks. There has not been a history of foreign travel. The patient denies a history of connective tissue or rheumatologic disease.    Review of Systems    CONSTITUTIONAL- Denies Unexplained Weight Loss, Fever, Chills, Sweats, Fatigue, Weakness or Malaise.    HENT- Denies Nasal Discharge, Sore Throat, Ear Pain, Ear Drainage, Headache, Sinus Pain, Nasal Congestion, Decreased Hearing or Tinnitus.    GASTROINTESTINAL- Denies Abdominal Pain, Nausea, Vomiting, Diarrhea, Blood per Rectum, Constipation or Heartburn.    PULMONARY- Denies Wheezing, Dyspnea, Sputum Production, Cough, Hemoptysis or Pleuritic Chest Pain.    Medications      Current Outpatient Medications:   •  albuterol sulfate  (90 Base) MCG/ACT inhaler, Inhale 2 puffs Every 4 (Four) Hours As Needed for Wheezing., Disp: 1 inhaler, Rfl: 0  •  aspirin 81 MG EC tablet, Take 81 mg by mouth Daily., Disp: , Rfl:   •  buPROPion XL (WELLBUTRIN XL) 300 MG 24 hr tablet, Take 1 tablet by mouth Daily., Disp: 90 tablet, Rfl: 1  •  FLUoxetine (PROzac) 20 MG tablet, Take 1.5 tablets by mouth Daily., Disp: 135 tablet, Rfl: 1  •  meloxicam (MOBIC) 15 MG tablet, Take 1 tablet by mouth Daily., Disp: 30 tablet, Rfl: 6  •  pantoprazole (PROTONIX) 40 MG EC tablet, Take 1 tablet by mouth Daily., Disp: 30 tablet, Rfl: 2  •  amoxicillin-clavulanate (Augmentin) 875-125 MG per tablet, Take 1 tablet by mouth 2 (Two) Times a Day., Disp: 20 tablet, Rfl: 0     Allergies    No Known Allergies    Problem List    Patient Active  Problem List   Diagnosis   • Anxiety   • PFO (patent foramen ovale)   • History of CVA (cerebrovascular accident)   • Obesity   • Concentration deficit   • Microangiopathy (CMS/HCC)       Medications, Allergies, Problems List and Past History were reviewed and updated.    Physical Examination    /88 (BP Location: Left arm, Patient Position: Sitting, Cuff Size: Adult)   Pulse 86   Temp 97.2 °F (36.2 °C) (Temporal)   Resp 18   Wt 99 kg (218 lb 4 oz)   SpO2 96%   BMI 33.93 kg/m²     HEENT: Head- Normocephalic Atraumatic. Facies- Within normal limits. Pinnas- Normal texture and shape bilaterally. Canals- Normal bilaterally. TMs- Normal bilaterally. Nares- Patent bilaterally. Nasal Septum- is normal. There is no tenderness to palpation over the frontal or maxillary sinuses. Lids- Normal bilaterally. Conjunctiva- Clear bilaterally. Sclera- Anicteric bilaterally. Oropharynx- Moist with no lesions. Tonsils- No enlargement, erythema or exudate.    Lymph Nodes: Cervical- There are enlarged right posterior lymph nodes. These lymph nodes are tender to palpation and they do not have erythema.    Cardiovascular: Heart- Normal Rate with Regular rhythm and no murmurs.    Dermatologic: The patient has no worrisome or suspicious skin lesions noted.    Impression and Assessment    Lymphadenitis.    Plan    Lymphadenitis Plan: Medication will be added as noted.    Koffi was seen today for facial pain.    Diagnoses and all orders for this visit:    Lymphadenitis  -     amoxicillin-clavulanate (Augmentin) 875-125 MG per tablet; Take 1 tablet by mouth 2 (Two) Times a Day.        Return to Office    The patient was instructed to return for follow-up as needed.    The patient was instructed to return sooner if the condition changes, worsens, or does not resolve.

## 2020-04-13 ENCOUNTER — OFFICE VISIT (OUTPATIENT)
Dept: INTERNAL MEDICINE | Facility: CLINIC | Age: 60
End: 2020-04-13

## 2020-04-13 ENCOUNTER — TELEPHONE (OUTPATIENT)
Dept: INTERNAL MEDICINE | Facility: CLINIC | Age: 60
End: 2020-04-13

## 2020-04-13 VITALS
TEMPERATURE: 98.6 F | RESPIRATION RATE: 17 BRPM | HEIGHT: 67 IN | HEART RATE: 95 BPM | WEIGHT: 218 LBS | SYSTOLIC BLOOD PRESSURE: 118 MMHG | BODY MASS INDEX: 34.21 KG/M2 | OXYGEN SATURATION: 96 % | DIASTOLIC BLOOD PRESSURE: 60 MMHG

## 2020-04-13 DIAGNOSIS — R59.1 LYMPHADENOPATHY: Primary | ICD-10-CM

## 2020-04-13 LAB
EXPIRATION DATE: NORMAL
EXPIRATION DATE: NORMAL
HETEROPH AB SER QL LA: NEGATIVE
INTERNAL CONTROL: NORMAL
Lab: 229

## 2020-04-13 PROCEDURE — 86308 HETEROPHILE ANTIBODY SCREEN: CPT | Performed by: PHYSICIAN ASSISTANT

## 2020-04-13 PROCEDURE — 36415 COLL VENOUS BLD VENIPUNCTURE: CPT | Performed by: PHYSICIAN ASSISTANT

## 2020-04-13 PROCEDURE — 80053 COMPREHEN METABOLIC PANEL: CPT | Performed by: PHYSICIAN ASSISTANT

## 2020-04-13 PROCEDURE — 86140 C-REACTIVE PROTEIN: CPT | Performed by: PHYSICIAN ASSISTANT

## 2020-04-13 PROCEDURE — 99213 OFFICE O/P EST LOW 20 MIN: CPT | Performed by: PHYSICIAN ASSISTANT

## 2020-04-13 PROCEDURE — 85025 COMPLETE CBC W/AUTO DIFF WBC: CPT | Performed by: PHYSICIAN ASSISTANT

## 2020-04-13 RX ORDER — SULFAMETHOXAZOLE AND TRIMETHOPRIM 800; 160 MG/1; MG/1
1 TABLET ORAL 2 TIMES DAILY
Qty: 20 TABLET | Refills: 0 | Status: SHIPPED | OUTPATIENT
Start: 2020-04-13 | End: 2020-04-23

## 2020-04-13 NOTE — PROGRESS NOTES
Chief Complaint   Patient presents with   • Facial Swelling     x1 week F/U, Abx not helping       Subjective       History of Present Illness     Koffi Melo is a 59 y.o. male. He presents for follow up of lymphadenopathy. Patient reports 2 week history of intermittent pain of R posterior neck from the base of his skull to just under his R ear. This pain comes and goes. It is worse with movement such as rotating his head to the left. He has pain daily, but no temporal pattern to pain. He also feels he had some swelling or possibly a swollen LN last week, although this is not as prominent today but pain persists. He was seen at PCP office on 4/3/2020 and prescribed Augmentin, and has 1 day remaining of Augmentin. He does not feel this has improved his pain, although LN is less swollen. He denies fever, chills, cough, sore throat, ear pain, HA, difficulty swallowing, abdominal pain, N/V/D. He denies any recent travel, or any recent bug bites. He has not tried OTC pain relievers, heat or ice.     The following portions of the patient's history were reviewed and updated as appropriate: allergies, current medications, past medical history and problem list.    No Known Allergies  Social History     Tobacco Use   • Smoking status: Never Smoker   • Smokeless tobacco: Never Used   Substance Use Topics   • Alcohol use: No         Current Outpatient Medications:   •  albuterol sulfate  (90 Base) MCG/ACT inhaler, Inhale 2 puffs Every 4 (Four) Hours As Needed for Wheezing., Disp: 1 inhaler, Rfl: 0  •  aspirin 81 MG EC tablet, Take 81 mg by mouth Daily., Disp: , Rfl:   •  buPROPion XL (WELLBUTRIN XL) 300 MG 24 hr tablet, Take 1 tablet by mouth Daily., Disp: 90 tablet, Rfl: 1  •  FLUoxetine (PROzac) 20 MG tablet, Take 1.5 tablets by mouth Daily., Disp: 135 tablet, Rfl: 1  •  meloxicam (MOBIC) 15 MG tablet, Take 1 tablet by mouth Daily., Disp: 30 tablet, Rfl: 6  •  pantoprazole (PROTONIX) 40 MG EC tablet, Take 1  tablet by mouth Daily., Disp: 30 tablet, Rfl: 2  •  sulfamethoxazole-trimethoprim (Bactrim DS) 800-160 MG per tablet, Take 1 tablet by mouth 2 (Two) Times a Day for 10 days., Disp: 20 tablet, Rfl: 0    Review of Systems   Constitutional: Negative for chills, fatigue, fever and unexpected weight loss.   HENT: Positive for swollen glands. Negative for congestion, ear pain, sinus pressure, sore throat and trouble swallowing.    Respiratory: Negative for cough and shortness of breath.    Cardiovascular: Negative for chest pain.   Gastrointestinal: Negative for abdominal pain, diarrhea, nausea and vomiting.   Musculoskeletal: Positive for neck pain. Negative for back pain.   Skin: Negative for color change and rash.   Allergic/Immunologic: Negative for immunocompromised state.   Neurological: Negative for dizziness, weakness and headache.       Objective   Vitals:    04/13/20 1522   BP: 118/60   Pulse: 95   Resp: 17   Temp: 98.6 °F (37 °C)   SpO2: 96%     Physical Exam   Constitutional: He appears well-developed and well-nourished.   HENT:   Head: Normocephalic and atraumatic.   Right Ear: Tympanic membrane, external ear and ear canal normal.   Left Ear: Tympanic membrane, external ear and ear canal normal.   Mouth/Throat: Oropharynx is clear and moist and mucous membranes are normal.   Eyes: Conjunctivae are normal.   Neck: Trachea normal and normal range of motion. Neck supple. No spinous process tenderness and no muscular tenderness present. Carotid bruit is not present. Normal range of motion present. No thyromegaly present.   +pt reports discomfort with active ROM neck rotation to left.    Cardiovascular: Normal rate and regular rhythm.   No murmur heard.  Pulmonary/Chest: Effort normal and breath sounds normal. He has no wheezes. He has no rales.   Abdominal: Bowel sounds are normal.   Lymphadenopathy:        Head (right side): No submandibular, no tonsillar, no preauricular, no posterior auricular and no occipital  adenopathy present.        Head (left side): No submandibular, no tonsillar, no preauricular, no posterior auricular and no occipital adenopathy present.     He has cervical adenopathy.        Right cervical: Posterior cervical adenopathy present. No superficial cervical and no deep cervical adenopathy present.       Left cervical: No superficial cervical, no deep cervical and no posterior cervical adenopathy present.        Right: No supraclavicular adenopathy present.        Left: No supraclavicular adenopathy present.   Skin: Skin is warm and dry. No rash noted.   Psychiatric: He has a normal mood and affect. His behavior is normal.         Assessment/Plan   Koffi was seen today for facial swelling.    Diagnoses and all orders for this visit:    Lymphadenopathy  -     CBC & Differential  -     Comprehensive Metabolic Panel  -     C-reactive Protein  -     POC Infectious Mononucleosis Antibody  -     CBC Auto Differential  -     sulfamethoxazole-trimethoprim (Bactrim DS) 800-160 MG per tablet; Take 1 tablet by mouth 2 (Two) Times a Day for 10 days.        Will check basic labs today as patient would like further investigation. Advised to d/c Augmentin remaining two doses, and begin Bactrim.   Advised to RTC if he develops fever, chills, persistent HA or severe HA, or other concerning sx.            Return if symptoms worsen or fail to improve.

## 2020-04-13 NOTE — TELEPHONE ENCOUNTER
Please schedule today with an office visit with Dr. Churchill.  Daniel Salazar MD  10:21  04/13/20

## 2020-04-13 NOTE — TELEPHONE ENCOUNTER
Patient called and stated that he saw Dr. Salazar 4/3/20 about a pain behind his ear.  Pt states that the pain has not gotten any better.  Pt doesn't know if he needs to be seen again or be seen by someone else.    [facility]  PH: [phone]  FX: [phone]    Patient callback: [phone]    Please advise

## 2020-04-13 NOTE — TELEPHONE ENCOUNTER
Spoke with Koffi.  He requested the latest appointment today.  Dr Churchill was already booked   Appoinment scheduled with Charla at 315pm   Verb understanding given

## 2020-04-14 ENCOUNTER — TELEPHONE (OUTPATIENT)
Dept: INTERNAL MEDICINE | Facility: CLINIC | Age: 60
End: 2020-04-14

## 2020-04-14 LAB
ALBUMIN SERPL-MCNC: 4.5 G/DL (ref 3.5–5.2)
ALBUMIN/GLOB SERPL: 2 G/DL
ALP SERPL-CCNC: 70 U/L (ref 39–117)
ALT SERPL W P-5'-P-CCNC: 22 U/L (ref 1–41)
ANION GAP SERPL CALCULATED.3IONS-SCNC: 12.4 MMOL/L (ref 5–15)
AST SERPL-CCNC: 24 U/L (ref 1–40)
BASOPHILS # BLD AUTO: 0.05 10*3/MM3 (ref 0–0.2)
BASOPHILS NFR BLD AUTO: 0.6 % (ref 0–1.5)
BILIRUB SERPL-MCNC: 0.3 MG/DL (ref 0.2–1.2)
BUN BLD-MCNC: 23 MG/DL (ref 6–20)
BUN/CREAT SERPL: 16.3 (ref 7–25)
CALCIUM SPEC-SCNC: 9.3 MG/DL (ref 8.6–10.5)
CHLORIDE SERPL-SCNC: 105 MMOL/L (ref 98–107)
CO2 SERPL-SCNC: 23.6 MMOL/L (ref 22–29)
CREAT BLD-MCNC: 1.41 MG/DL (ref 0.76–1.27)
CRP SERPL-MCNC: 0.23 MG/DL (ref 0–0.5)
DEPRECATED RDW RBC AUTO: 43.1 FL (ref 37–54)
EOSINOPHIL # BLD AUTO: 0.12 10*3/MM3 (ref 0–0.4)
EOSINOPHIL NFR BLD AUTO: 1.5 % (ref 0.3–6.2)
ERYTHROCYTE [DISTWIDTH] IN BLOOD BY AUTOMATED COUNT: 13.2 % (ref 12.3–15.4)
GFR SERPL CREATININE-BSD FRML MDRD: 52 ML/MIN/1.73
GLOBULIN UR ELPH-MCNC: 2.3 GM/DL
GLUCOSE BLD-MCNC: 110 MG/DL (ref 65–99)
HCT VFR BLD AUTO: 45.8 % (ref 37.5–51)
HGB BLD-MCNC: 15.7 G/DL (ref 13–17.7)
IMM GRANULOCYTES # BLD AUTO: 0.06 10*3/MM3 (ref 0–0.05)
IMM GRANULOCYTES NFR BLD AUTO: 0.7 % (ref 0–0.5)
LYMPHOCYTES # BLD AUTO: 1.76 10*3/MM3 (ref 0.7–3.1)
LYMPHOCYTES NFR BLD AUTO: 21.3 % (ref 19.6–45.3)
MCH RBC QN AUTO: 30.5 PG (ref 26.6–33)
MCHC RBC AUTO-ENTMCNC: 34.3 G/DL (ref 31.5–35.7)
MCV RBC AUTO: 89.1 FL (ref 79–97)
MONOCYTES # BLD AUTO: 0.62 10*3/MM3 (ref 0.1–0.9)
MONOCYTES NFR BLD AUTO: 7.5 % (ref 5–12)
NEUTROPHILS # BLD AUTO: 5.65 10*3/MM3 (ref 1.7–7)
NEUTROPHILS NFR BLD AUTO: 68.4 % (ref 42.7–76)
NRBC BLD AUTO-RTO: 0 /100 WBC (ref 0–0.2)
PLATELET # BLD AUTO: 239 10*3/MM3 (ref 140–450)
PMV BLD AUTO: 10.8 FL (ref 6–12)
POTASSIUM BLD-SCNC: 4.1 MMOL/L (ref 3.5–5.2)
PROT SERPL-MCNC: 6.8 G/DL (ref 6–8.5)
RBC # BLD AUTO: 5.14 10*6/MM3 (ref 4.14–5.8)
SODIUM BLD-SCNC: 141 MMOL/L (ref 136–145)
WBC NRBC COR # BLD: 8.26 10*3/MM3 (ref 3.4–10.8)

## 2020-04-14 NOTE — TELEPHONE ENCOUNTER
Koffi Melo [phone]  Pt returned call, advised of clinical message. Pt is in agreement with plan.

## 2020-04-14 NOTE — TELEPHONE ENCOUNTER
Koffi Melo [phone]  [facility] requesting pt call back regarding lab results. Office number given.

## 2020-04-14 NOTE — TELEPHONE ENCOUNTER
----- Message from Randee Moreno PA-C sent at 4/14/2020  2:15 PM EDT -----  Please call patient. His labs are fairly normal. His kidney function is mildly reduced, which I do not believe is related to his current symptoms but we will monitor this moving forward as he has had normal kidney function in the past. Otherwise labs unremarkable.

## 2020-06-05 RX ORDER — FLUOXETINE 20 MG/1
TABLET, FILM COATED ORAL
Qty: 135 TABLET | Refills: 1 | Status: SHIPPED | OUTPATIENT
Start: 2020-06-05 | End: 2020-12-02

## 2020-07-22 RX ORDER — BUPROPION HYDROCHLORIDE 300 MG/1
TABLET ORAL
Qty: 90 TABLET | Refills: 1 | Status: SHIPPED | OUTPATIENT
Start: 2020-07-22 | End: 2020-12-31

## 2020-12-02 RX ORDER — FLUOXETINE 20 MG/1
TABLET, FILM COATED ORAL
Qty: 135 TABLET | Refills: 1 | Status: SHIPPED | OUTPATIENT
Start: 2020-12-02 | End: 2021-05-17

## 2020-12-31 RX ORDER — BUPROPION HYDROCHLORIDE 300 MG/1
TABLET ORAL
Qty: 90 TABLET | Refills: 1 | Status: SHIPPED | OUTPATIENT
Start: 2020-12-31 | End: 2021-06-09

## 2021-01-20 ENCOUNTER — OFFICE VISIT (OUTPATIENT)
Dept: INTERNAL MEDICINE | Facility: CLINIC | Age: 61
End: 2021-01-20

## 2021-01-20 VITALS
SYSTOLIC BLOOD PRESSURE: 118 MMHG | BODY MASS INDEX: 32.02 KG/M2 | TEMPERATURE: 96.8 F | RESPIRATION RATE: 18 BRPM | HEART RATE: 76 BPM | WEIGHT: 206 LBS | DIASTOLIC BLOOD PRESSURE: 72 MMHG

## 2021-01-20 DIAGNOSIS — F41.9 ANXIETY: Primary | ICD-10-CM

## 2021-01-20 DIAGNOSIS — Z23 IMMUNIZATION DUE: ICD-10-CM

## 2021-01-20 PROCEDURE — 99213 OFFICE O/P EST LOW 20 MIN: CPT | Performed by: INTERNAL MEDICINE

## 2021-01-20 PROCEDURE — 90686 IIV4 VACC NO PRSV 0.5 ML IM: CPT | Performed by: INTERNAL MEDICINE

## 2021-01-20 PROCEDURE — 90471 IMMUNIZATION ADMIN: CPT | Performed by: INTERNAL MEDICINE

## 2021-01-20 NOTE — PROGRESS NOTES
Chief Complaint   Patient presents with   • Follow-up     Follow up chronic medical problems       History of Present Illness    The patient presents for a follow-up related to anxiety. He denies currently having anxiety symptoms. He is not having panic attacks. His energy level is good. He denies agorophobia. He sleeps well. He is currently taking a medication. He states that his current anxiety symptoms are stable. The current medication regimen consists of fluoxetine and Wellbutrin XL. The patient denies having medication side effects including nausea, headaches, anxiety, increased depression, anorgasmia or fatigue.    Review of Systems    PSYCHIATRIC- Denies Depression, Anxiety, Loneliness, Tearfulness, Hopelessness or Suicidal Ideation.    Medications      Current Outpatient Medications:   •  albuterol sulfate  (90 Base) MCG/ACT inhaler, Inhale 2 puffs Every 4 (Four) Hours As Needed for Wheezing., Disp: 1 inhaler, Rfl: 0  •  aspirin 81 MG EC tablet, Take 81 mg by mouth Daily., Disp: , Rfl:   •  buPROPion XL (WELLBUTRIN XL) 300 MG 24 hr tablet, TAKE 1 TABLET DAILY, Disp: 90 tablet, Rfl: 1  •  FLUoxetine (PROzac) 20 MG tablet, TAKE ONE AND ONE-HALF TABLETS DAILY, Disp: 135 tablet, Rfl: 1     Allergies    No Known Allergies    Problem List    Patient Active Problem List   Diagnosis   • Anxiety   • PFO (patent foramen ovale)   • History of CVA (cerebrovascular accident)   • Obesity   • Concentration deficit   • Microangiopathy (CMS/HCC)       Medications, Allergies, Problems List and Past History were reviewed and updated.    Physical Examination    /72 (BP Location: Right arm, Patient Position: Sitting, Cuff Size: Adult)   Pulse 76   Temp 96.8 °F (36 °C) (Infrared)   Resp 18   Wt 93.4 kg (206 lb)   BMI 32.02 kg/m²       Psychiatric Examination: The patient appears appropriate, clean and tidy with a level of consciousness that is appropriate and alert. The facial expression is appropriate and he  makes good eye contact. The patient is talkative and the speech volume is appropriate. The words are articulated clearly with a normal flow. There are no circumlocutions and the patient does not paraphrase.       The mood is appropriate. There are no abnormal thought processes and the thought content is normal. There are no delusions or hallucinations noted.    Impression and Assessment    Anxiety Disorder Unspecified.    Plan    Anxiety Disorder Unspecified Plan: The current plan was continued.    Diagnoses and all orders for this visit:    1. Anxiety (Primary)    2. Immunization due  -     Fluarix/Fluzone/Afluria Quad>6 Months        Return to Office    The patient was instructed to return for follow-up in 6 months. Next Visit: Physical Examination. The patient was instructed to return sooner if the condition changes, worsens, or does not resolve.

## 2021-02-08 ENCOUNTER — OFFICE VISIT (OUTPATIENT)
Dept: INTERNAL MEDICINE | Facility: CLINIC | Age: 61
End: 2021-02-08

## 2021-02-08 DIAGNOSIS — B34.9 ACUTE VIRAL SYNDROME: ICD-10-CM

## 2021-02-08 DIAGNOSIS — R50.9 FEVER, UNSPECIFIED FEVER CAUSE: Primary | ICD-10-CM

## 2021-02-08 PROCEDURE — 99442 PR PHYS/QHP TELEPHONE EVALUATION 11-20 MIN: CPT | Performed by: PHYSICIAN ASSISTANT

## 2021-02-08 NOTE — PROGRESS NOTES
Chief Complaint   Patient presents with   • Chills     feels feverish, chills, body aches, Covid test today       Subjective     You have chosen to receive care through a telephone visit. Do you consent to use a telephone visit for your medical care today? Yes      History of Present Illness     Koffi Melo is a 60 y.o. male. He presents with concerns for Covid 19. Pt reports <1 day history of symptoms which began this morning. He reports subjective fever, chills, body aches, HA, nausea, decreased appetite. He denies sore throat, cough, SOA, vomiting, diarrhea, loss of taste or smell. He has no known Covid contacts. He has already had a Covid test at  today, results pending. He has not taken any medication for this issue.       The following portions of the patient's history were reviewed and updated as appropriate: allergies, current medications, past medical history and problem list.    No Known Allergies  Social History     Tobacco Use   • Smoking status: Never Smoker   • Smokeless tobacco: Never Used   Substance Use Topics   • Alcohol use: No         Current Outpatient Medications:   •  albuterol sulfate  (90 Base) MCG/ACT inhaler, Inhale 2 puffs Every 4 (Four) Hours As Needed for Wheezing., Disp: 1 inhaler, Rfl: 0  •  aspirin 81 MG EC tablet, Take 81 mg by mouth Daily., Disp: , Rfl:   •  buPROPion XL (WELLBUTRIN XL) 300 MG 24 hr tablet, TAKE 1 TABLET DAILY, Disp: 90 tablet, Rfl: 1  •  FLUoxetine (PROzac) 20 MG tablet, TAKE ONE AND ONE-HALF TABLETS DAILY, Disp: 135 tablet, Rfl: 1    Review of Systems   Constitutional: Positive for appetite change, chills, fatigue and fever.        +body aches   HENT: Negative for congestion, ear pain, sore throat and trouble swallowing.    Eyes: Negative for pain.   Respiratory: Negative for cough, shortness of breath and wheezing.    Cardiovascular: Negative for chest pain and palpitations.   Gastrointestinal: Positive for nausea. Negative for abdominal pain,  diarrhea and vomiting.   Skin: Negative for rash.   Allergic/Immunologic: Negative for immunocompromised state.   Neurological: Positive for headache. Negative for dizziness and weakness.       Objective   There were no vitals filed for this visit.  Physical Exam  Neurological:      Mental Status: He is alert and oriented to person, place, and time.   Psychiatric:         Mood and Affect: Mood normal.               Assessment/Plan   Diagnoses and all orders for this visit:    1. Fever, unspecified fever cause (Primary)    2. Acute viral syndrome      High suspicion of Covid-19. Advised that his test today may be too early to detect given just a few hours of sx. If he has a negative test and sx persist, advised repeat at our office or PCR testing site.   Discussed quarantine protocol until his test is resulted/ symptoms have resolved. If test negative, he may return to work after >48 hours afebrile without medication.   Advised tylenol rotating with ibuprofen and supportive care.   Pt will call us with his results when they are available.        This visit has been rescheduled as a phone visit to comply with patient safety concerns in accordance with CDC recommendations. Total time of discussion was 13 minutes.        Return if symptoms worsen or fail to improve.

## 2021-02-10 ENCOUNTER — TELEPHONE (OUTPATIENT)
Dept: INTERNAL MEDICINE | Facility: CLINIC | Age: 61
End: 2021-02-10

## 2021-02-10 NOTE — TELEPHONE ENCOUNTER
PATIENT CALLED STATING THAT HIS TEST CAME BACK NEGATIVE.  PATIENT IS FEELING BETTER AND IS BACK AT WORK. PATIENT SAID THANK YOU.      PATIENT'S CONTACT [phone]

## 2021-03-29 ENCOUNTER — OFFICE VISIT (OUTPATIENT)
Dept: INTERNAL MEDICINE | Facility: CLINIC | Age: 61
End: 2021-03-29

## 2021-03-29 VITALS
SYSTOLIC BLOOD PRESSURE: 110 MMHG | WEIGHT: 200 LBS | DIASTOLIC BLOOD PRESSURE: 80 MMHG | RESPIRATION RATE: 18 BRPM | BODY MASS INDEX: 31.39 KG/M2 | TEMPERATURE: 97 F | HEIGHT: 67 IN | HEART RATE: 90 BPM | OXYGEN SATURATION: 97 %

## 2021-03-29 DIAGNOSIS — R07.81 RIB PAIN ON RIGHT SIDE: Primary | ICD-10-CM

## 2021-03-29 PROCEDURE — 96372 THER/PROPH/DIAG INJ SC/IM: CPT | Performed by: PHYSICIAN ASSISTANT

## 2021-03-29 PROCEDURE — 99213 OFFICE O/P EST LOW 20 MIN: CPT | Performed by: PHYSICIAN ASSISTANT

## 2021-03-29 RX ORDER — IBUPROFEN 800 MG/1
800 TABLET ORAL EVERY 6 HOURS PRN
Qty: 20 TABLET | Refills: 0 | Status: SHIPPED | OUTPATIENT
Start: 2021-03-29 | End: 2021-07-28

## 2021-03-29 RX ORDER — DEXAMETHASONE SODIUM PHOSPHATE 4 MG/ML
4 INJECTION, SOLUTION INTRA-ARTICULAR; INTRALESIONAL; INTRAMUSCULAR; INTRAVENOUS; SOFT TISSUE ONCE
Status: COMPLETED | OUTPATIENT
Start: 2021-03-29 | End: 2021-03-29

## 2021-03-29 RX ADMIN — DEXAMETHASONE SODIUM PHOSPHATE 4 MG: 4 INJECTION, SOLUTION INTRA-ARTICULAR; INTRALESIONAL; INTRAMUSCULAR; INTRAVENOUS; SOFT TISSUE at 14:35

## 2021-03-29 RX ADMIN — DEXAMETHASONE SODIUM PHOSPHATE 4 MG: 4 INJECTION, SOLUTION INTRA-ARTICULAR; INTRALESIONAL; INTRAMUSCULAR; INTRAVENOUS; SOFT TISSUE at 14:34

## 2021-03-29 NOTE — PROGRESS NOTES
"Chief Complaint   Patient presents with   • Back Pain     3 days, sharp pain, right side pain, \" feels like follows rib cage\"       Subjective       History of Present Illness     Koffi Melo is a 60 y.o. male. He presents with 3 day history of R back pain/ rib pain. Pt denies any recent injury, trauma, falls or overuse. Pain is 8/10 at worst, currently 5/10 at rest. He is not sleeping well due to pain. Denies any radiating pain. Denies numbness or tingling of lower extremities. Pain is not worsened with deep breathing, only certain movements. He denies any cough, SOA, wheezing or chest pain. He has tried muscle relaxer, tylenol rotating Advil with minimal relief. He has not tried heating pad or ice.       The following portions of the patient's history were reviewed and updated as appropriate: allergies, current medications, past medical history, past social history and problem list.    No Known Allergies  Social History     Tobacco Use   • Smoking status: Never Smoker   • Smokeless tobacco: Never Used   Substance Use Topics   • Alcohol use: No         Current Outpatient Medications:   •  albuterol sulfate  (90 Base) MCG/ACT inhaler, Inhale 2 puffs Every 4 (Four) Hours As Needed for Wheezing., Disp: 1 inhaler, Rfl: 0  •  aspirin 81 MG EC tablet, Take 81 mg by mouth Daily., Disp: , Rfl:   •  buPROPion XL (WELLBUTRIN XL) 300 MG 24 hr tablet, TAKE 1 TABLET DAILY, Disp: 90 tablet, Rfl: 1  •  FLUoxetine (PROzac) 20 MG tablet, TAKE ONE AND ONE-HALF TABLETS DAILY, Disp: 135 tablet, Rfl: 1  •  ibuprofen (ADVIL,MOTRIN) 800 MG tablet, Take 1 tablet by mouth Every 6 (Six) Hours As Needed for Moderate Pain ., Disp: 20 tablet, Rfl: 0  No current facility-administered medications for this visit.    Review of Systems   Constitutional: Negative for chills, fatigue and fever.   HENT: Negative for congestion, ear pain and sore throat.    Eyes: Negative for pain.   Respiratory: Negative for cough, shortness of breath " and wheezing.    Cardiovascular: Negative for chest pain and palpitations.   Gastrointestinal: Negative for abdominal pain, nausea and vomiting.   Genitourinary: Negative for dysuria and hematuria.   Musculoskeletal: Positive for arthralgias and back pain.   Skin: Negative for rash.   Neurological: Negative for dizziness, weakness and headache.       Objective   Vitals:    03/29/21 1352   BP: 110/80   Pulse: 90   Resp: 18   Temp: 97 °F (36.1 °C)   SpO2: 97%     Physical Exam  Constitutional:       Appearance: Normal appearance. He is well-developed.   HENT:      Head: Normocephalic and atraumatic.      Right Ear: Tympanic membrane, ear canal and external ear normal.      Left Ear: Tympanic membrane, ear canal and external ear normal.      Nose: Nose normal.      Mouth/Throat:      Mouth: Mucous membranes are moist.      Pharynx: Oropharynx is clear.   Eyes:      Conjunctiva/sclera: Conjunctivae normal.   Neck:      Thyroid: No thyromegaly.   Cardiovascular:      Rate and Rhythm: Normal rate and regular rhythm.      Heart sounds: No murmur heard.     Pulmonary:      Effort: Pulmonary effort is normal.      Breath sounds: Normal breath sounds. No wheezing or rales.   Chest:      Chest wall: Tenderness present. No mass or crepitus.      Comments: +TTP at R lateral ribcage  Abdominal:      Palpations: Abdomen is soft. There is no mass.      Tenderness: There is no abdominal tenderness.   Musculoskeletal:      Cervical back: Neck supple.      Thoracic back: Normal. No tenderness or bony tenderness.      Lumbar back: Normal. No tenderness or bony tenderness.   Lymphadenopathy:      Cervical: No cervical adenopathy.   Skin:     Findings: No rash.   Psychiatric:         Behavior: Behavior normal.           Assessment/Plan   Diagnoses and all orders for this visit:    1. Rib pain on right side (Primary)  -     ibuprofen (ADVIL,MOTRIN) 800 MG tablet; Take 1 tablet by mouth Every 6 (Six) Hours As Needed for Moderate Pain .   Dispense: 20 tablet; Refill: 0  -     dexamethasone (DECADRON) injection 4 mg  -     dexamethasone (DECADRON) injection 4 mg      Advised pt to let us know if his sx have not improved in the next few days. Advised heating pad and rest as well. Avoid overhead lifting until full recovery.            Return if symptoms worsen or fail to improve.

## 2021-05-17 ENCOUNTER — OFFICE VISIT (OUTPATIENT)
Dept: INTERNAL MEDICINE | Facility: CLINIC | Age: 61
End: 2021-05-17

## 2021-05-17 VITALS
RESPIRATION RATE: 16 BRPM | OXYGEN SATURATION: 96 % | SYSTOLIC BLOOD PRESSURE: 118 MMHG | BODY MASS INDEX: 31.8 KG/M2 | HEIGHT: 67 IN | WEIGHT: 202.6 LBS | TEMPERATURE: 97.8 F | HEART RATE: 75 BPM | DIASTOLIC BLOOD PRESSURE: 70 MMHG

## 2021-05-17 DIAGNOSIS — F41.9 ANXIETY: Primary | ICD-10-CM

## 2021-05-17 DIAGNOSIS — R41.840 CONCENTRATION DEFICIT: ICD-10-CM

## 2021-05-17 DIAGNOSIS — R53.83 OTHER FATIGUE: ICD-10-CM

## 2021-05-17 LAB
25(OH)D3 SERPL-MCNC: 29.6 NG/ML
ALBUMIN SERPL-MCNC: 4.2 G/DL (ref 3.5–5.2)
ALBUMIN/GLOB SERPL: 1.9 G/DL
ALP SERPL-CCNC: 68 U/L (ref 39–117)
ALT SERPL W P-5'-P-CCNC: 21 U/L (ref 1–41)
ANION GAP SERPL CALCULATED.3IONS-SCNC: 7.8 MMOL/L (ref 5–15)
AST SERPL-CCNC: 24 U/L (ref 1–40)
BASOPHILS # BLD AUTO: 0.04 10*3/MM3 (ref 0–0.2)
BASOPHILS NFR BLD AUTO: 0.5 % (ref 0–1.5)
BILIRUB SERPL-MCNC: 0.2 MG/DL (ref 0–1.2)
BUN SERPL-MCNC: 25 MG/DL (ref 6–20)
BUN/CREAT SERPL: 22.3 (ref 7–25)
CALCIUM SPEC-SCNC: 8.9 MG/DL (ref 8.6–10.5)
CHLORIDE SERPL-SCNC: 107 MMOL/L (ref 98–107)
CO2 SERPL-SCNC: 24.2 MMOL/L (ref 22–29)
CREAT SERPL-MCNC: 1.12 MG/DL (ref 0.76–1.27)
DEPRECATED RDW RBC AUTO: 40.6 FL (ref 37–54)
EOSINOPHIL # BLD AUTO: 0.15 10*3/MM3 (ref 0–0.4)
EOSINOPHIL NFR BLD AUTO: 1.8 % (ref 0.3–6.2)
ERYTHROCYTE [DISTWIDTH] IN BLOOD BY AUTOMATED COUNT: 12.6 % (ref 12.3–15.4)
GFR SERPL CREATININE-BSD FRML MDRD: 67 ML/MIN/1.73
GLOBULIN UR ELPH-MCNC: 2.2 GM/DL
GLUCOSE SERPL-MCNC: 94 MG/DL (ref 65–99)
HCT VFR BLD AUTO: 45 % (ref 37.5–51)
HGB BLD-MCNC: 15.2 G/DL (ref 13–17.7)
IMM GRANULOCYTES # BLD AUTO: 0.09 10*3/MM3 (ref 0–0.05)
IMM GRANULOCYTES NFR BLD AUTO: 1.1 % (ref 0–0.5)
LYMPHOCYTES # BLD AUTO: 1.58 10*3/MM3 (ref 0.7–3.1)
LYMPHOCYTES NFR BLD AUTO: 19.2 % (ref 19.6–45.3)
MCH RBC QN AUTO: 30.2 PG (ref 26.6–33)
MCHC RBC AUTO-ENTMCNC: 33.8 G/DL (ref 31.5–35.7)
MCV RBC AUTO: 89.3 FL (ref 79–97)
MONOCYTES # BLD AUTO: 0.6 10*3/MM3 (ref 0.1–0.9)
MONOCYTES NFR BLD AUTO: 7.3 % (ref 5–12)
NEUTROPHILS NFR BLD AUTO: 5.75 10*3/MM3 (ref 1.7–7)
NEUTROPHILS NFR BLD AUTO: 70.1 % (ref 42.7–76)
NRBC BLD AUTO-RTO: 0 /100 WBC (ref 0–0.2)
PLATELET # BLD AUTO: 248 10*3/MM3 (ref 140–450)
PMV BLD AUTO: 10 FL (ref 6–12)
POTASSIUM SERPL-SCNC: 4.3 MMOL/L (ref 3.5–5.2)
PROT SERPL-MCNC: 6.4 G/DL (ref 6–8.5)
RBC # BLD AUTO: 5.04 10*6/MM3 (ref 4.14–5.8)
SODIUM SERPL-SCNC: 139 MMOL/L (ref 136–145)
T4 FREE SERPL-MCNC: 1.32 NG/DL (ref 0.93–1.7)
TSH SERPL DL<=0.05 MIU/L-ACNC: 1.45 UIU/ML (ref 0.27–4.2)
WBC # BLD AUTO: 8.21 10*3/MM3 (ref 3.4–10.8)

## 2021-05-17 PROCEDURE — 85025 COMPLETE CBC W/AUTO DIFF WBC: CPT | Performed by: PHYSICIAN ASSISTANT

## 2021-05-17 PROCEDURE — 80053 COMPREHEN METABOLIC PANEL: CPT | Performed by: PHYSICIAN ASSISTANT

## 2021-05-17 PROCEDURE — 84439 ASSAY OF FREE THYROXINE: CPT | Performed by: PHYSICIAN ASSISTANT

## 2021-05-17 PROCEDURE — 99213 OFFICE O/P EST LOW 20 MIN: CPT | Performed by: PHYSICIAN ASSISTANT

## 2021-05-17 PROCEDURE — 82306 VITAMIN D 25 HYDROXY: CPT | Performed by: PHYSICIAN ASSISTANT

## 2021-05-17 PROCEDURE — 84443 ASSAY THYROID STIM HORMONE: CPT | Performed by: PHYSICIAN ASSISTANT

## 2021-05-17 RX ORDER — FLUOXETINE HYDROCHLORIDE 40 MG/1
40 CAPSULE ORAL DAILY
Qty: 30 CAPSULE | Refills: 2 | Status: SHIPPED | OUTPATIENT
Start: 2021-05-17 | End: 2022-02-25 | Stop reason: SDUPTHER

## 2021-05-17 RX ORDER — MELATONIN 10 MG
CAPSULE ORAL
COMMUNITY
End: 2021-06-17

## 2021-05-17 NOTE — PROGRESS NOTES
Chief Complaint   Patient presents with   • Stress     1 month, overwhelming at times       Subjective       History of Present Illness     Koffi Melo is a 60 y.o. male. He presents for follow up anxiety and stress. Pt reports he has had worsening stress and anxiety in the last 1 month. Work is very stressful in the pandemic. He reports decreased concentration and trouble staying on task at work. He also reports lack of motivation. He feels more fatigued in the last month and not sleeping well. He has tried melatonin for sleep but this gives him AM hangover effect. He does have anxiety at baseline, taking Prozac and Wellbutrin which have controlled his sx well in the past. He denies any low moods or concerns for depression. No anxiety attacks. He has no previous hx of ADHD in childhood. Pt notes that he has had similar episodes in the past with same sx, and increase in Prozac did improve his sx with last similar episode.       The following portions of the patient's history were reviewed and updated as appropriate: allergies, current medications, past medical history and problem list.    No Known Allergies  Social History     Tobacco Use   • Smoking status: Never Smoker   • Smokeless tobacco: Never Used   Substance Use Topics   • Alcohol use: No         Current Outpatient Medications:   •  albuterol sulfate  (90 Base) MCG/ACT inhaler, Inhale 2 puffs Every 4 (Four) Hours As Needed for Wheezing., Disp: 1 inhaler, Rfl: 0  •  aspirin 81 MG EC tablet, Take 81 mg by mouth Daily., Disp: , Rfl:   •  buPROPion XL (WELLBUTRIN XL) 300 MG 24 hr tablet, TAKE 1 TABLET DAILY, Disp: 90 tablet, Rfl: 1  •  ibuprofen (ADVIL,MOTRIN) 800 MG tablet, Take 1 tablet by mouth Every 6 (Six) Hours As Needed for Moderate Pain ., Disp: 20 tablet, Rfl: 0  •  Melatonin 10 MG capsule, Take  by mouth., Disp: , Rfl:   •  FLUoxetine (PROzac) 40 MG capsule, Take 1 capsule by mouth Daily., Disp: 30 capsule, Rfl: 2    Review of Systems    Constitutional: Positive for fatigue. Negative for chills and fever.   HENT: Negative for congestion, ear pain, sore throat and trouble swallowing.    Eyes: Negative for pain.   Respiratory: Negative for cough, shortness of breath and wheezing.    Cardiovascular: Negative for chest pain.   Gastrointestinal: Negative for abdominal pain, diarrhea, nausea and vomiting.   Genitourinary: Negative for dysuria and hematuria.   Skin: Negative for rash.   Allergic/Immunologic: Negative for immunocompromised state.   Neurological: Negative for dizziness, syncope, weakness and headache.   Psychiatric/Behavioral: Positive for sleep disturbance and stress. Negative for depressed mood. The patient is nervous/anxious.        Objective   Vitals:    05/17/21 1157   BP: 118/70   Pulse: 75   Resp: 16   Temp: 97.8 °F (36.6 °C)   SpO2: 96%     Physical Exam  Constitutional:       Appearance: Normal appearance. He is well-developed.   HENT:      Head: Normocephalic and atraumatic.      Right Ear: Tympanic membrane, ear canal and external ear normal.      Left Ear: Tympanic membrane, ear canal and external ear normal.      Nose: Nose normal.      Mouth/Throat:      Mouth: Mucous membranes are moist.      Pharynx: Oropharynx is clear.   Eyes:      Conjunctiva/sclera: Conjunctivae normal.   Neck:      Thyroid: No thyromegaly.   Cardiovascular:      Rate and Rhythm: Normal rate and regular rhythm.      Heart sounds: No murmur heard.     Pulmonary:      Effort: Pulmonary effort is normal.      Breath sounds: Normal breath sounds. No wheezing or rales.   Abdominal:      Palpations: Abdomen is soft. There is no mass.      Tenderness: There is no abdominal tenderness.   Musculoskeletal:      Cervical back: Neck supple.   Lymphadenopathy:      Cervical: No cervical adenopathy.   Skin:     Findings: No rash.   Psychiatric:         Behavior: Behavior normal.               Assessment/Plan   Diagnoses and all orders for this visit:    1. Anxiety  (Primary)  -     TSH  -     T4, Free  -     FLUoxetine (PROzac) 40 MG capsule; Take 1 capsule by mouth Daily.  Dispense: 30 capsule; Refill: 2    2. Concentration deficit    3. Other fatigue  -     CBC & Differential  -     Comprehensive Metabolic Panel  -     TSH  -     T4, Free  -     Vitamin D 25 Hydroxy        Likely stress-related, agree with patient. However, he has not had labs in >1 year so we will check routine fatigue labs as well today. He is in agreement with small increase in his Prozac as well as this medication has seemed to work well in the past. We also discussed some other things he can do to relax after work and try to clear his mind.            Return for Next scheduled follow up.

## 2021-05-24 ENCOUNTER — TELEPHONE (OUTPATIENT)
Dept: INTERNAL MEDICINE | Facility: CLINIC | Age: 61
End: 2021-05-24

## 2021-05-24 NOTE — TELEPHONE ENCOUNTER
Please call pt and let him know his labs are essentially normal. His Vit D is just a tiny bit decreased, and he may consider adding Vit D supplement 1000 units daily to improve this. Otherwise normal.

## 2021-05-31 RX ORDER — FLUOXETINE 20 MG/1
TABLET, FILM COATED ORAL
Qty: 135 TABLET | Refills: 3 | Status: SHIPPED | OUTPATIENT
Start: 2021-05-31 | End: 2021-06-17

## 2021-06-09 RX ORDER — BUPROPION HYDROCHLORIDE 300 MG/1
TABLET ORAL
Qty: 90 TABLET | Refills: 3 | Status: SHIPPED | OUTPATIENT
Start: 2021-06-09 | End: 2022-06-06

## 2021-06-17 ENCOUNTER — OFFICE VISIT (OUTPATIENT)
Dept: CARDIOLOGY | Facility: CLINIC | Age: 61
End: 2021-06-17

## 2021-06-17 VITALS
HEIGHT: 68 IN | SYSTOLIC BLOOD PRESSURE: 124 MMHG | DIASTOLIC BLOOD PRESSURE: 66 MMHG | BODY MASS INDEX: 30.92 KG/M2 | HEART RATE: 80 BPM | WEIGHT: 204 LBS | OXYGEN SATURATION: 97 %

## 2021-06-17 DIAGNOSIS — Z01.818 PRE-OPERATIVE CLEARANCE: Primary | ICD-10-CM

## 2021-06-17 DIAGNOSIS — Q21.12 PFO (PATENT FORAMEN OVALE): ICD-10-CM

## 2021-06-17 PROCEDURE — 93000 ELECTROCARDIOGRAM COMPLETE: CPT | Performed by: PHYSICIAN ASSISTANT

## 2021-06-17 PROCEDURE — 99213 OFFICE O/P EST LOW 20 MIN: CPT | Performed by: PHYSICIAN ASSISTANT

## 2021-06-17 RX ORDER — ACETAMINOPHEN 500 MG
TABLET ORAL AS NEEDED
COMMUNITY
Start: 2021-06-01 | End: 2021-07-28

## 2021-06-17 NOTE — PROGRESS NOTES
[facility] at [facility] - Office Note  Koffi Melo         [address] [facility] [address]  1960   [phone] (home) [phone] (work)       Location:  [place] office.  Visit Type: Follow Up.    06/17/21     PCP:  Daniel Salazar MD    Identification:  Koffi Melo is a 60 y.o.  male  currently employed.      Chief Complaint: Pre Op clearance prior to knee replacement.    PROBLEM LIST/PMHx:  1.  Pre Op Clearance  2.  PFO   A.  CVA with right hemiparesis, 1/31/2013   B.  MRI demonstrated acute infarct of the precentral gyrus.   C.  JOHN (1/31/2013): PFO/fenestrated ASD with atrial septal aneurysm    D.  Status post PFO closure using a 30 mm Lamont Helex septal occluder, 3/2/13   E.  Echo (7/27/16): Normal LVEF.  Septal occluder in place with no rocking motion.  Positive bubble study suggesting residual right to left shunt.  3.  CVA  4.  DJD/OA        No Known Allergies      Current Outpatient Medications:   •  acetaminophen (TYLENOL) 500 MG tablet, As Needed., Disp: , Rfl:   •  aspirin 81 MG EC tablet, Take 81 mg by mouth Daily., Disp: , Rfl:   •  buPROPion XL (WELLBUTRIN XL) 300 MG 24 hr tablet, TAKE 1 TABLET DAILY, Disp: 90 tablet, Rfl: 3  •  FLUoxetine (PROzac) 40 MG capsule, Take 1 capsule by mouth Daily., Disp: 30 capsule, Rfl: 2  •  ibuprofen (ADVIL,MOTRIN) 800 MG tablet, Take 1 tablet by mouth Every 6 (Six) Hours As Needed for Moderate Pain ., Disp: 20 tablet, Rfl: 0    HPI  Koffi Melo is a 60-year-old  male with a past medical history of PFO status post closure 2013.  Follow-up echo showed normal placement of device.  He is here today for reevaluation prior to knee replacement surgery, in need of cardiac clearance.  He denies chest pain or angina, denies exertional dyspnea, orthopnea or PND.  He has no CHF type symptoms.  He does very well overall and other than difficulty moving around with his arthritis remains  "active.      The following portions of the patient's history were reviewed in the chart and updated as appropriate: allergies, current medications, past family history, past medical history, past social history, past surgical history and problem list.    Review of Systems   Constitutional: Negative for malaise/fatigue.   Cardiovascular: Negative for chest pain, dyspnea on exertion, irregular heartbeat and leg swelling.   Respiratory: Negative for cough and shortness of breath.    Neurological: Negative for dizziness, headaches and light-headedness.   All other systems reviewed and are negative.            height is 172.7 cm (68\") and weight is 92.5 kg (204 lb). His blood pressure is 124/66 and his pulse is 80. His oxygen saturation is 97%.   Vitals reviewed.   Constitutional:       Appearance: Normal appearance. Well-developed.   Pulmonary:      Effort: Pulmonary effort is normal.      Breath sounds: Normal breath sounds. No wheezing. No rhonchi. No rales.   Cardiovascular:      Normal rate. Regular rhythm.   Pulses:     Intact distal pulses.   Abdominal:      General: Bowel sounds are normal.      Palpations: Abdomen is soft.   Neurological:      Mental Status: Alert.             ECG 12 Lead    Date/Time: 6/17/2021 3:27 PM  Performed by: Nancy Le PA  Authorized by: Nancy Le PA   Comparison: compared with previous ECG from 9/17/2019  Similar to previous ECG  Rhythm: sinus rhythm  Rate: normal  QRS axis: normal    Clinical impression: normal ECG             Assessment/ Plan   Diagnoses and all orders for this visit:    1. Pre-operative clearance (Primary): Patient is doing well, asymptomatic.  EKG performed today and reviewed shows normal sinus rhythm, heart rate 74 bpm.  No acute changes and no changes compared to prior tracings.  Patient is cleared from a cardiac standpoint to proceed with knee surgery, Dr. [facility]eda at [facility].  We will continue to see on a as needed basis.    2. PFO (patent foramen " ovale): Patient is status post closure device.  Post procedural echo shows normal seating of closure device and patient has had no further neurologic issues.  Again, we will see on a as needed basis.        Nancy Le, PA-ANISH  6/17/2021 15:23 EDT

## 2021-06-22 ENCOUNTER — OFFICE VISIT (OUTPATIENT)
Dept: INTERNAL MEDICINE | Facility: CLINIC | Age: 61
End: 2021-06-22

## 2021-06-22 VITALS
TEMPERATURE: 97.2 F | RESPIRATION RATE: 18 BRPM | DIASTOLIC BLOOD PRESSURE: 70 MMHG | WEIGHT: 200 LBS | BODY MASS INDEX: 30.41 KG/M2 | SYSTOLIC BLOOD PRESSURE: 126 MMHG | HEART RATE: 56 BPM

## 2021-06-22 DIAGNOSIS — J20.9 ACUTE BRONCHITIS, UNSPECIFIED ORGANISM: Primary | ICD-10-CM

## 2021-06-22 PROCEDURE — 99213 OFFICE O/P EST LOW 20 MIN: CPT | Performed by: INTERNAL MEDICINE

## 2021-06-22 RX ORDER — AMOXICILLIN AND CLAVULANATE POTASSIUM 875; 125 MG/1; MG/1
1 TABLET, FILM COATED ORAL 2 TIMES DAILY
Qty: 20 TABLET | Refills: 0 | Status: SHIPPED | OUTPATIENT
Start: 2021-06-22 | End: 2021-07-28

## 2021-06-22 RX ORDER — PHENOL 1.4 %
AEROSOL, SPRAY (ML) MUCOUS MEMBRANE NIGHTLY
COMMUNITY
End: 2021-07-28

## 2021-06-22 RX ORDER — METHYLPREDNISOLONE 4 MG/1
TABLET ORAL
Qty: 21 TABLET | Refills: 0 | Status: SHIPPED | OUTPATIENT
Start: 2021-06-22 | End: 2021-07-28

## 2021-06-28 NOTE — PROGRESS NOTES
Chief Complaint   Patient presents with   • Fatigue   • Headache   • Cough       History of Present Illness    The patient presents for an acute visit for a two week history of a hacking cough. There are other symptoms including facial pain, nasal congestion, rhinorrhea and sore throat but the patient does not report wheezing, fever, a headache, eye drainage, ear pain, ear drainage, nausea, vomiting, diarrhea, chills, decreased appetite, abdominal pain, myalgias or a rash. The nasal discharge is yellow and thick. The patient has not had hemoptysis. The patient is not exposed to cigarette smoke. The patient has not tried anything for treatment of this illness.    Review of Systems    CONSTITUTIONAL- Denies Unexplained Weight Loss, Sweats, Fatigue, Weakness or Malaise.    HENT- Denies Sinus Pain or Decreased Hearing.    GASTROINTESTINAL- Denies Blood per Rectum, Constipation or Heartburn.    PULMONARY- Reports: Sputum Production and Cough. Denies: Dyspnea or Pleuritic Chest Pain.    CARDIOVASCULAR- Denies Chest Pain, Claudication, Edema, Syncope, Palpitations or Irregular Heart Beat.    Medications      Current Outpatient Medications:   •  acetaminophen (TYLENOL) 500 MG tablet, As Needed., Disp: , Rfl:   •  aspirin 81 MG EC tablet, Take 81 mg by mouth Daily., Disp: , Rfl:   •  buPROPion XL (WELLBUTRIN XL) 300 MG 24 hr tablet, TAKE 1 TABLET DAILY, Disp: 90 tablet, Rfl: 3  •  FLUoxetine (PROzac) 40 MG capsule, Take 1 capsule by mouth Daily., Disp: 30 capsule, Rfl: 2  •  ibuprofen (ADVIL,MOTRIN) 800 MG tablet, Take 1 tablet by mouth Every 6 (Six) Hours As Needed for Moderate Pain ., Disp: 20 tablet, Rfl: 0  •  Melatonin 10 MG tablet, Take  by mouth Every Night., Disp: , Rfl:   •  amoxicillin-clavulanate (Augmentin) 875-125 MG per tablet, Take 1 tablet by mouth 2 (Two) Times a Day., Disp: 20 tablet, Rfl: 0  •  methylPREDNISolone (MEDROL) 4 MG dose pack, Take as directed on package instructions., Disp: 21 tablet, Rfl: 0      Allergies    No Known Allergies    Problem List    Patient Active Problem List   Diagnosis   • Anxiety   • PFO (patent foramen ovale)   • History of CVA (cerebrovascular accident)   • Obesity   • Concentration deficit   • Microangiopathy (CMS/HCC)   • Pre-operative clearance       Medications, Allergies, Problems List and Past History were reviewed and updated.    Physical Examination    /70 (BP Location: Right arm, Patient Position: Sitting, Cuff Size: Adult)   Pulse 56   Temp 97.2 °F (36.2 °C) (Infrared)   Resp 18   Wt 90.7 kg (200 lb)   BMI 30.41 kg/m²     HEENT: Head- Normocephalic Atraumatic. Facies- Within normal limits. Pinnas- Normal texture and shape bilaterally. Canals- Normal bilaterally. TMs- Normal bilaterally. Nares- Patent bilaterally. Nasal Septum- is normal. There is no tenderness to palpation over the frontal or maxillary sinuses. Lids- Normal bilaterally. Conjunctiva- Clear bilaterally. Sclera- Anicteric bilaterally. Oropharynx- Moist with no lesions. Tonsils- No enlargement, erythema or exudate.    Lungs: Auscultation- Both lungs are have abnormal findings. The right lung has diffuse inspiratory and expiratory wheezes and coarse ronchi. The left lung has diffuse inspiratory and expiratory wheezes and coarse ronchi. There are no retractions, clubbing or cyanosis. The Expiratory to Inspiratory ratio is equal.    Lymph Nodes: Cervical- no enlarged lymph nodes noted.    Cardiovascular: There are no carotid bruits. Heart- Normal Rate with Regular rhythm and no murmurs. There are no gallops. There are no rubs. In the lower extremities there is no edema. The upper extremities do not have edema.    Impression and Assessment    Acute Bronchitis.    Plan    Acute Bronchitis Plan: Use over the counter acetaminophen for fevers or comfort. A cool mist humidifier was encouraged. He was encouraged to drink plenty of fluids. Adequate rest was advised. Medication will be added as noted  below.    Diagnoses and all orders for this visit:    1. Acute bronchitis, unspecified organism (Primary)  -     amoxicillin-clavulanate (Augmentin) 875-125 MG per tablet; Take 1 tablet by mouth 2 (Two) Times a Day.  Dispense: 20 tablet; Refill: 0  -     methylPREDNISolone (MEDROL) 4 MG dose pack; Take as directed on package instructions.  Dispense: 21 tablet; Refill: 0        Return to Office    The patient was instructed to return for follow-up at the next scheduled visit. The patient was instructed to return sooner if the condition changes, worsens, or does not resolve.

## 2021-07-21 ENCOUNTER — TELEPHONE (OUTPATIENT)
Dept: INTERNAL MEDICINE | Facility: CLINIC | Age: 61
End: 2021-07-21

## 2021-07-21 DIAGNOSIS — M25.511 CHRONIC RIGHT SHOULDER PAIN: Primary | ICD-10-CM

## 2021-07-21 DIAGNOSIS — G89.29 CHRONIC RIGHT SHOULDER PAIN: Primary | ICD-10-CM

## 2021-07-21 DIAGNOSIS — M54.2 NECK PAIN: ICD-10-CM

## 2021-07-21 NOTE — TELEPHONE ENCOUNTER
[facility] [place] [facility] called and states they need PT Referral for patient for Right neck and shoulder pain. Fax [phone] Phone [phone].

## 2021-07-28 ENCOUNTER — OFFICE VISIT (OUTPATIENT)
Dept: INTERNAL MEDICINE | Facility: CLINIC | Age: 61
End: 2021-07-28

## 2021-07-28 VITALS
BODY MASS INDEX: 30.6 KG/M2 | WEIGHT: 201.25 LBS | RESPIRATION RATE: 20 BRPM | SYSTOLIC BLOOD PRESSURE: 138 MMHG | DIASTOLIC BLOOD PRESSURE: 88 MMHG | TEMPERATURE: 97.7 F | HEART RATE: 83 BPM

## 2021-07-28 DIAGNOSIS — E78.5 HYPERLIPIDEMIA, UNSPECIFIED HYPERLIPIDEMIA TYPE: ICD-10-CM

## 2021-07-28 DIAGNOSIS — K21.9 GASTROESOPHAGEAL REFLUX DISEASE WITHOUT ESOPHAGITIS: ICD-10-CM

## 2021-07-28 DIAGNOSIS — Z12.5 PROSTATE CANCER SCREENING: ICD-10-CM

## 2021-07-28 DIAGNOSIS — Z00.00 PHYSICAL EXAM: Primary | ICD-10-CM

## 2021-07-28 PROCEDURE — 81003 URINALYSIS AUTO W/O SCOPE: CPT | Performed by: INTERNAL MEDICINE

## 2021-07-28 PROCEDURE — G0103 PSA SCREENING: HCPCS | Performed by: INTERNAL MEDICINE

## 2021-07-28 PROCEDURE — 84443 ASSAY THYROID STIM HORMONE: CPT | Performed by: INTERNAL MEDICINE

## 2021-07-28 PROCEDURE — 99396 PREV VISIT EST AGE 40-64: CPT | Performed by: INTERNAL MEDICINE

## 2021-07-28 PROCEDURE — 85025 COMPLETE CBC W/AUTO DIFF WBC: CPT | Performed by: INTERNAL MEDICINE

## 2021-07-28 PROCEDURE — 80053 COMPREHEN METABOLIC PANEL: CPT | Performed by: INTERNAL MEDICINE

## 2021-07-28 PROCEDURE — 36415 COLL VENOUS BLD VENIPUNCTURE: CPT | Performed by: INTERNAL MEDICINE

## 2021-07-28 PROCEDURE — 80061 LIPID PANEL: CPT | Performed by: INTERNAL MEDICINE

## 2021-07-28 RX ORDER — NAPROXEN 250 MG/1
250 TABLET ORAL NIGHTLY
COMMUNITY
End: 2022-02-14

## 2021-07-28 RX ORDER — SILDENAFIL 50 MG/1
50 TABLET, FILM COATED ORAL DAILY PRN
Qty: 30 TABLET | Refills: 2 | Status: SHIPPED | OUTPATIENT
Start: 2021-07-28 | End: 2022-02-14

## 2021-07-28 NOTE — PROGRESS NOTES
Chief Complaint   Patient presents with   • Annual Exam     NON FASTING       History of Present Illness      The patient presents for an established patient physical examination and health maintenance visit. The patient has had a colonoscopy.    The patient presents for a follow-up related to GERD. The patient is not on medication for his gastroesophageal reflux. He reports no abdominal pain, belching, chest pain, diarrhea, dysphagia, early satiety, heartburn, hoarseness, nausea, odynophagia, rectal bleeding, vomiting or weight loss. The GERD has no known aggravating factors.    The patient presents for a follow-up related to hyperlipidemia. He is following a low fat diet. He reports that he is exercising. He is not taking medication for hyperlipidemia. He denies shortness of breath, orthopnea, paroxysmal nocturnal dyspnea, dyspnea on exertion, edema, palpitations or syncope.    Review of Systems    CONSTITUTIONAL- Denies Fever, Chills, Sweats, Fatigue, Weakness or Malaise.    NECK- Denies Decreased Range of Motion, Stiffness, Thyroid Nodules, Enlarged Lymph Nodes or Goiter.    EYES- Denies Eye Pain, Eye Drainage, Eye Redness, Eye Discharge, Decreased Vision, Visual Disturbance, Diplopia, Visual Loss or Swollen Eyelid.    HENT- Denies Nasal Discharge, Sore Throat, Ear Pain, Ear Drainage, Headache, Sinus Pain, Nasal Congestion, Decreased Hearing or Tinnitus.    PULMONARY- Denies Wheezing, Sputum Production, Cough, Hemoptysis or Pleuritic Chest Pain.    GASTROINTESTINAL- Denies Constipation.    GENITOURINARY- Denies Penile Discharge, Erectile Dysfunction, Testicular Mass, Testicular Pain, Hernia, Nocturia, Decreased Urine Stream, Incomplete Voiding, Urinary Frequency, Urinary Urgency, Dysuria or Hematuria.    CARDIOVASCULAR- Denies Claudication or Irregular Heart Beat.    ENDOCRINE- Denies Cold Intolerance, Depression, Hair Loss, Heat Intolerance, Memory Loss, Polydypsia, Polyphagia, Polyuria, Sleep Disturbance or  Weight Gain.    NEUROLOGIC- Denies Seizures, Visual Changes, Confusion or Excessive Daytime Sleepiness.    MUSCULOSKELETAL- Denies Joint Pain, Joint Stiffness, Decreased Range of Motion, Joint Swelling or Erythema of Joints.    INTEGUMENTARY- Denies Rash, Lumps, Sores, Itching, Dryness, Color Change, Changes in Hair, Brittle Nails, Discolored Nails, Thickened Nails, Growths or Alopecia.    Medications      Current Outpatient Medications:   •  buPROPion XL (WELLBUTRIN XL) 300 MG 24 hr tablet, TAKE 1 TABLET DAILY, Disp: 90 tablet, Rfl: 3  •  FLUoxetine (PROzac) 40 MG capsule, Take 1 capsule by mouth Daily., Disp: 30 capsule, Rfl: 2  •  naproxen (NAPROSYN) 250 MG tablet, Take 250 mg by mouth Every Night., Disp: , Rfl:   •  aspirin 81 MG EC tablet, Take 81 mg by mouth Daily., Disp: , Rfl:      Allergies    Allergies   Allergen Reactions   • Hydrocodone-Acetaminophen Itching       Problem List    Patient Active Problem List   Diagnosis   • Anxiety   • PFO (patent foramen ovale)   • History of CVA (cerebrovascular accident)   • Obesity   • Concentration deficit   • Microangiopathy (CMS/HCC)   • Pre-operative clearance       Medications, Allergies, Problems List and Past History were reviewed and updated.    Physical Examination    /88 (BP Location: Left arm)   Pulse 83   Temp 97.7 °F (36.5 °C) (Temporal)   Resp 20   Wt 91.3 kg (201 lb 4 oz)   BMI 30.60 kg/m²     HEENT: Head- Normocephalic Atraumatic. Facies- Within normal limits. Pinnas- Normal texture and shape bilaterally. Canals- Normal bilaterally. TMs- Normal bilaterally. Nares- Patent bilaterally. Nasal Septum- is normal. There is no tenderness to palpation over the frontal or maxillary sinuses. Lids- Normal bilaterally. Conjunctiva- Clear bilaterally. Sclera- Anicteric bilaterally. Oropharynx- Moist with no lesions. Tonsils- No enlargement, erythema or exudate.    Neck: Thyroid- non enlarged, symmetric and has no nodules. No bruits are detected. ROM-  Normal Range of Motion with no rigidity.    Lungs: Auscultation- Clear to auscultation bilaterally. There are no retractions, clubbing or cyanosis. The Expiratory to Inspiratory ratio is equal.    Lymph Nodes: Cervical- no enlarged lymph nodes noted. Clavicular- Deferred. Axillary- Deferred. Inguinal- Deferred.    Cardiovascular: There are no carotid bruits. Heart- Normal Rate with Regular rhythm and no murmurs. There are no gallops. There are no rubs. In the lower extremities there is no edema. The upper extremities do not have edema.    Abdomen: Soft, benign, non-tender with no masses, hernias, organomegaly or scars.    Male Genitourinary: The penis is circumcised with testicles found in the scrotum bilaterally. Testicular Size is normal bilaterally. The penis has no anatomic abnormalities.    Rectal: reveals normal external sphincter tone with no external lesions.    Prostate: The prostate gland is symmetric and smooth with no nodularity.    Dermatologic: The patient has no worrisome or suspicious skin lesions noted.    Impression and Assessment    Normal Physical Examination.    Encounter for Screening for Malignant Neoplasm of the Prostate.    Gastroesophageal Reflux Disease.    Hyperlipidemia.    Plan    Gastroesophageal Reflux Disease Plan: The condition is stable. No change is needed in the current plan.    Hyperlipidemia Plan: The patient was instructed to exercise daily and eat a low fat diet.    Counseling was provided regarding: Adequate Aerobic Exercise, Dental Visits, Flossing Teeth, Heart Healthy Diet and Seat Belt Utilization.    The following was ordered for screening and health maintenance: CBC w Automated Diff, CMP, Lipid Profile, PSA, TSH and U/A.       Immunizations Ordered and Administered: None.    Diagnoses and all orders for this visit:    1. Physical exam (Primary)  -     TSH  -     CBC & Differential  -     POC Urinalysis Dipstick, Automated    2. Hyperlipidemia, unspecified hyperlipidemia  type  -     Comprehensive Metabolic Panel  -     Lipid Panel    3. Gastroesophageal reflux disease without esophagitis    4. Prostate cancer screening  -     PSA Screen          Return to Office    The patient was instructed to return for follow-up in 1 year. The patient was instructed to return sooner if the condition changes, worsens, or does not resolve.

## 2021-07-29 LAB
ALBUMIN SERPL-MCNC: 4.4 G/DL (ref 3.5–5.2)
ALBUMIN/GLOB SERPL: 1.8 G/DL
ALP SERPL-CCNC: 73 U/L (ref 39–117)
ALT SERPL W P-5'-P-CCNC: 24 U/L (ref 1–41)
ANION GAP SERPL CALCULATED.3IONS-SCNC: 9.5 MMOL/L (ref 5–15)
AST SERPL-CCNC: 30 U/L (ref 1–40)
BASOPHILS # BLD AUTO: 0.06 10*3/MM3 (ref 0–0.2)
BASOPHILS NFR BLD AUTO: 0.7 % (ref 0–1.5)
BILIRUB BLD-MCNC: NEGATIVE MG/DL
BILIRUB SERPL-MCNC: 0.3 MG/DL (ref 0–1.2)
BUN SERPL-MCNC: 17 MG/DL (ref 6–20)
BUN/CREAT SERPL: 16 (ref 7–25)
CALCIUM SPEC-SCNC: 9.6 MG/DL (ref 8.6–10.5)
CHLORIDE SERPL-SCNC: 107 MMOL/L (ref 98–107)
CHOLEST SERPL-MCNC: 218 MG/DL (ref 0–200)
CLARITY, POC: CLEAR
CO2 SERPL-SCNC: 23.5 MMOL/L (ref 22–29)
COLOR UR: YELLOW
CREAT SERPL-MCNC: 1.06 MG/DL (ref 0.76–1.27)
DEPRECATED RDW RBC AUTO: 43.2 FL (ref 37–54)
EOSINOPHIL # BLD AUTO: 0.17 10*3/MM3 (ref 0–0.4)
EOSINOPHIL NFR BLD AUTO: 2.1 % (ref 0.3–6.2)
ERYTHROCYTE [DISTWIDTH] IN BLOOD BY AUTOMATED COUNT: 12.7 % (ref 12.3–15.4)
EXPIRATION DATE: NORMAL
GFR SERPL CREATININE-BSD FRML MDRD: 72 ML/MIN/1.73
GLOBULIN UR ELPH-MCNC: 2.4 GM/DL
GLUCOSE SERPL-MCNC: 78 MG/DL (ref 65–99)
GLUCOSE UR STRIP-MCNC: NEGATIVE MG/DL
HCT VFR BLD AUTO: 48.6 % (ref 37.5–51)
HDLC SERPL-MCNC: 44 MG/DL (ref 40–60)
HGB BLD-MCNC: 15.7 G/DL (ref 13–17.7)
IMM GRANULOCYTES # BLD AUTO: 0.08 10*3/MM3 (ref 0–0.05)
IMM GRANULOCYTES NFR BLD AUTO: 1 % (ref 0–0.5)
KETONES UR QL: NEGATIVE
LDLC SERPL CALC-MCNC: 127 MG/DL (ref 0–100)
LDLC/HDLC SERPL: 2.75 {RATIO}
LEUKOCYTE EST, POC: NEGATIVE
LYMPHOCYTES # BLD AUTO: 1.69 10*3/MM3 (ref 0.7–3.1)
LYMPHOCYTES NFR BLD AUTO: 20.6 % (ref 19.6–45.3)
Lab: NORMAL
MCH RBC QN AUTO: 29.8 PG (ref 26.6–33)
MCHC RBC AUTO-ENTMCNC: 32.3 G/DL (ref 31.5–35.7)
MCV RBC AUTO: 92.2 FL (ref 79–97)
MONOCYTES # BLD AUTO: 0.69 10*3/MM3 (ref 0.1–0.9)
MONOCYTES NFR BLD AUTO: 8.4 % (ref 5–12)
NEUTROPHILS NFR BLD AUTO: 5.53 10*3/MM3 (ref 1.7–7)
NEUTROPHILS NFR BLD AUTO: 67.2 % (ref 42.7–76)
NITRITE UR-MCNC: NEGATIVE MG/ML
NRBC BLD AUTO-RTO: 0 /100 WBC (ref 0–0.2)
PH UR: 5 [PH] (ref 5–8)
PLATELET # BLD AUTO: 241 10*3/MM3 (ref 140–450)
PMV BLD AUTO: 10.5 FL (ref 6–12)
POTASSIUM SERPL-SCNC: 4.4 MMOL/L (ref 3.5–5.2)
PROT SERPL-MCNC: 6.8 G/DL (ref 6–8.5)
PROT UR STRIP-MCNC: NEGATIVE MG/DL
PSA SERPL-MCNC: 2.02 NG/ML (ref 0–4)
RBC # BLD AUTO: 5.27 10*6/MM3 (ref 4.14–5.8)
RBC # UR STRIP: NEGATIVE /UL
SODIUM SERPL-SCNC: 140 MMOL/L (ref 136–145)
SP GR UR: 1.01 (ref 1–1.03)
TRIGL SERPL-MCNC: 265 MG/DL (ref 0–150)
TSH SERPL DL<=0.05 MIU/L-ACNC: 1.83 UIU/ML (ref 0.27–4.2)
UROBILINOGEN UR QL: NORMAL
VLDLC SERPL-MCNC: 47 MG/DL (ref 5–40)
WBC # BLD AUTO: 8.22 10*3/MM3 (ref 3.4–10.8)

## 2022-02-14 ENCOUNTER — OFFICE VISIT (OUTPATIENT)
Dept: INTERNAL MEDICINE | Facility: CLINIC | Age: 62
End: 2022-02-14

## 2022-02-14 VITALS
SYSTOLIC BLOOD PRESSURE: 130 MMHG | WEIGHT: 209 LBS | DIASTOLIC BLOOD PRESSURE: 74 MMHG | BODY MASS INDEX: 31.78 KG/M2 | TEMPERATURE: 98 F | RESPIRATION RATE: 20 BRPM | HEART RATE: 80 BPM

## 2022-02-14 DIAGNOSIS — R93.0 ABNORMAL HEAD CT: ICD-10-CM

## 2022-02-14 DIAGNOSIS — M54.2 NECK PAIN: ICD-10-CM

## 2022-02-14 DIAGNOSIS — Q62.11 HYDRONEPHROSIS WITH URETEROPELVIC JUNCTION (UPJ) OBSTRUCTION: ICD-10-CM

## 2022-02-14 DIAGNOSIS — R07.2 RETROSTERNAL CHEST PAIN: Primary | ICD-10-CM

## 2022-02-14 DIAGNOSIS — R90.82 WHITE MATTER DISEASE, UNSPECIFIED: ICD-10-CM

## 2022-02-14 DIAGNOSIS — M54.50 ACUTE LOW BACK PAIN WITHOUT SCIATICA, UNSPECIFIED BACK PAIN LATERALITY: ICD-10-CM

## 2022-02-14 PROCEDURE — 99214 OFFICE O/P EST MOD 30 MIN: CPT | Performed by: INTERNAL MEDICINE

## 2022-02-14 RX ORDER — CYCLOBENZAPRINE HCL 10 MG
10 TABLET ORAL NIGHTLY PRN
Qty: 30 TABLET | Refills: 1 | Status: SHIPPED | OUTPATIENT
Start: 2022-02-14 | End: 2023-03-28

## 2022-02-14 RX ORDER — OMEPRAZOLE 20 MG/1
20 CAPSULE, DELAYED RELEASE ORAL DAILY
COMMUNITY
Start: 2021-08-05 | End: 2022-02-28

## 2022-02-14 RX ORDER — MELOXICAM 15 MG/1
15 TABLET ORAL DAILY PRN
Qty: 30 TABLET | Refills: 2 | Status: SHIPPED | OUTPATIENT
Start: 2022-02-14 | End: 2022-09-06 | Stop reason: SDUPTHER
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
## 2022-02-18 ENCOUNTER — TELEPHONE (OUTPATIENT)
Dept: INTERNAL MEDICINE | Facility: CLINIC | Age: 62
End: 2022-02-18

## 2022-02-18 NOTE — TELEPHONE ENCOUNTER
Caller: Koffi Melo    Relationship: Self    Best call back number: [phone]    What was the call regarding:   PATIENT STATED THAT HE IS SCHEDULED TO HAVE A MRI 03/08/2022 AND PATIENT WOULD LIKE TO HAVE THE MRI NEXT WEEK 02/21/2022 -02/25/2022 DUE TO INSURANCE PURPOSES     PATIENT WOULD LIKE A CALL BACK REGARDING THIS INFORMATION AND TO BE INFORMED IF HE IS ABLE TO GET THE MRI DONE SOONER AT ANOTHER LOCATION     Do you require a callback: YES

## 2022-02-21 ENCOUNTER — HOSPITAL ENCOUNTER (OUTPATIENT)
Dept: MRI IMAGING | Facility: HOSPITAL | Age: 62
Discharge: HOME | End: 2022-02-21
Admitting: INTERNAL MEDICINE

## 2022-02-21 ENCOUNTER — TELEPHONE (OUTPATIENT)
Dept: INTERNAL MEDICINE | Facility: CLINIC | Age: 62
End: 2022-02-21

## 2022-02-21 DIAGNOSIS — R93.0 ABNORMAL HEAD CT: ICD-10-CM

## 2022-02-21 DIAGNOSIS — R90.82 WHITE MATTER DISEASE, UNSPECIFIED: Primary | ICD-10-CM

## 2022-02-21 DIAGNOSIS — R90.82 WHITE MATTER DISEASE, UNSPECIFIED: ICD-10-CM

## 2022-02-21 DIAGNOSIS — R93.0 ABNORMAL MRI OF HEAD: ICD-10-CM

## 2022-02-21 PROCEDURE — A9577 INJ MULTIHANCE: HCPCS | Performed by: INTERNAL MEDICINE

## 2022-02-21 PROCEDURE — 0 GADOBENATE DIMEGLUMINE 529 MG/ML SOLUTION: Performed by: INTERNAL MEDICINE

## 2022-02-21 PROCEDURE — 70553 MRI BRAIN STEM W/O & W/DYE: CPT

## 2022-02-21 RX ADMIN — GADOBENATE DIMEGLUMINE 20 ML: 529 INJECTION, SOLUTION INTRAVENOUS at 13:49

## 2022-02-21 NOTE — TELEPHONE ENCOUNTER
Call patient please.  I received a request for an FMLA form to be filled out.  How many days of work did he miss?

## 2022-02-21 NOTE — TELEPHONE ENCOUNTER
Spoke with patient he needs to be excused from M-F 02/21/22-02/25/2022. He also wanted to let you know that he got his MRI done today.

## 2022-02-25 ENCOUNTER — TELEPHONE (OUTPATIENT)
Dept: INTERNAL MEDICINE | Facility: CLINIC | Age: 62
End: 2022-02-25

## 2022-02-25 DIAGNOSIS — F41.9 ANXIETY: ICD-10-CM

## 2022-02-25 RX ORDER — FLUOXETINE HYDROCHLORIDE 40 MG/1
40 CAPSULE ORAL DAILY
Qty: 30 CAPSULE | Refills: 2 | Status: SHIPPED | OUTPATIENT
Start: 2022-02-25 | End: 2022-06-06 | Stop reason: SDUPTHER

## 2022-02-25 NOTE — TELEPHONE ENCOUNTER
Caller: Koffi Melo    Relationship: Self    Best call back number:     [phone]    Requested Prescriptions:   Requested Prescriptions     Pending Prescriptions Disp Refills   • FLUoxetine (PROzac) 40 MG capsule 30 capsule 2     Sig: Take 1 capsule by mouth Daily.      PATIENT STATED THE REFILL WILL NOT ARRIVE UNTIL Tuesday, 3/1/22    PATIENT REQUESTED (8) CAPSULES TO COVER THE GAP BETWEEN NOW AND WHEN THE REFILL ARRIVES ON TUESDAY    Pharmacy where request should be sent:      [facility] [place]    TELEPHONE CONTACT:    [phone]    Additional details provided by patient:     N/A    Does the patient have less than a 3 day supply:  [x] Yes  [] No    Nikkie Lee Rep   02/25/22 10:03 EST     DR PIZARRO

## 2022-02-28 ENCOUNTER — OFFICE VISIT (OUTPATIENT)
Dept: INTERNAL MEDICINE | Facility: CLINIC | Age: 62
End: 2022-02-28

## 2022-02-28 VITALS
RESPIRATION RATE: 15 BRPM | WEIGHT: 208 LBS | HEART RATE: 78 BPM | BODY MASS INDEX: 31.63 KG/M2 | TEMPERATURE: 98.2 F | DIASTOLIC BLOOD PRESSURE: 78 MMHG | OXYGEN SATURATION: 97 % | SYSTOLIC BLOOD PRESSURE: 112 MMHG

## 2022-02-28 DIAGNOSIS — M25.512 ACUTE PAIN OF LEFT SHOULDER: Primary | ICD-10-CM

## 2022-02-28 PROCEDURE — 99213 OFFICE O/P EST LOW 20 MIN: CPT | Performed by: INTERNAL MEDICINE
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
## 2022-03-08 ENCOUNTER — APPOINTMENT (OUTPATIENT)
Dept: MRI IMAGING | Facility: HOSPITAL | Age: 62
End: 2022-03-08

## 2022-03-08 ENCOUNTER — TELEPHONE (OUTPATIENT)
Dept: INTERNAL MEDICINE | Facility: CLINIC | Age: 62
End: 2022-03-08
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
## 2022-03-09 NOTE — TELEPHONE ENCOUNTER
Patient called and stated that they do not need an earlier appointment.  He will wait until his appointment on 03/28/22

## 2022-03-09 NOTE — TELEPHONE ENCOUNTER
Please schedule him to come back in for reevaluation sometime next week.      Hold PT until I see him again.    Daniel Salazar MD  07:49 EST  03/09/22

## 2022-03-28 ENCOUNTER — HOSPITAL ENCOUNTER (OUTPATIENT)
Dept: GENERAL RADIOLOGY | Facility: HOSPITAL | Age: 62
Discharge: HOME | End: 2022-03-28
Admitting: INTERNAL MEDICINE

## 2022-03-28 ENCOUNTER — OFFICE VISIT (OUTPATIENT)
Dept: INTERNAL MEDICINE | Facility: CLINIC | Age: 62
End: 2022-03-28

## 2022-03-28 VITALS
SYSTOLIC BLOOD PRESSURE: 108 MMHG | RESPIRATION RATE: 16 BRPM | DIASTOLIC BLOOD PRESSURE: 64 MMHG | HEART RATE: 64 BPM | TEMPERATURE: 98.4 F | BODY MASS INDEX: 32.08 KG/M2 | WEIGHT: 211 LBS
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
## 2022-03-28 DIAGNOSIS — M54.6 THORACIC SPINE PAIN: ICD-10-CM

## 2022-03-28 DIAGNOSIS — M25.512 ACUTE PAIN OF BOTH SHOULDERS: ICD-10-CM

## 2022-03-28 DIAGNOSIS — M25.511 ACUTE PAIN OF BOTH SHOULDERS: ICD-10-CM

## 2022-03-28 PROCEDURE — 72072 X-RAY EXAM THORAC SPINE 3VWS: CPT

## 2022-03-28 PROCEDURE — 99213 OFFICE O/P EST LOW 20 MIN: CPT | Performed by: INTERNAL MEDICINE

## 2022-03-28 PROCEDURE — 73030 X-RAY EXAM OF SHOULDER: CPT
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
## 2022-05-11 ENCOUNTER — TELEPHONE (OUTPATIENT)
Dept: INTERNAL MEDICINE | Facility: CLINIC | Age: 62
End: 2022-05-11

## 2022-05-11 NOTE — TELEPHONE ENCOUNTER
Caller: Koffi Melo    Relationship: Self    Best call back number: [phone]    Caller requesting test results:  PATIENT     What test was performed: NUCLEAR KIDNEY TEST     When was the test performed: 3-3-22    Where was the test performed: [facility]      Additional notes:  PLEASE CALL WITH RESULTS

## 2022-05-12 NOTE — TELEPHONE ENCOUNTER
S/W pt, expl we do not have where we ordered test or results of such test.  States he thought we did it but may have been a different office.  Expl do see where we referred him to urology and most likely they ordered but we do not have note from them indicating such and do not have results.  Verb understanding and agreement.

## 2022-05-25 RX ORDER — FLUOXETINE 20 MG/1
TABLET, FILM COATED ORAL
Qty: 135 TABLET | Refills: 3 | OUTPATIENT
Start: 2022-05-25

## 2022-06-06 DIAGNOSIS — F41.9 ANXIETY: ICD-10-CM

## 2022-06-06 RX ORDER — FLUOXETINE HYDROCHLORIDE 40 MG/1
40 CAPSULE ORAL DAILY
Qty: 90 CAPSULE | Refills: 1 | Status: SHIPPED | OUTPATIENT
Start: 2022-06-06 | End: 2023-02-08

## 2022-06-06 RX ORDER — FLUOXETINE HYDROCHLORIDE 40 MG/1
40 CAPSULE ORAL DAILY
Qty: 30 CAPSULE | Refills: 2 | Status: CANCELLED | OUTPATIENT
Start: 2022-06-06

## 2022-06-06 RX ORDER — FLUOXETINE HYDROCHLORIDE 40 MG/1
40 CAPSULE ORAL DAILY
Qty: 90 CAPSULE | Refills: 0 | Status: SHIPPED | OUTPATIENT
Start: 2022-06-06 | End: 2022-06-06 | Stop reason: SDUPTHER

## 2022-06-06 RX ORDER — FLUOXETINE HYDROCHLORIDE 40 MG/1
40 CAPSULE ORAL DAILY
Qty: 14 CAPSULE | Refills: 0 | Status: SHIPPED | OUTPATIENT
Start: 2022-06-06 | End: 2022-06-06 | Stop reason: SDUPTHER

## 2022-06-06 RX ORDER — BUPROPION HYDROCHLORIDE 300 MG/1
TABLET ORAL
Qty: 90 TABLET | Refills: 0 | Status: SHIPPED | OUTPATIENT
Start: 2022-06-06 | End: 2022-09-02

## 2022-06-06 NOTE — TELEPHONE ENCOUNTER
Caller: WhitefishKoffi    Relationship: Self    Best call back number: [phone]    Requested Prescriptions:   Requested Prescriptions     Pending Prescriptions Disp Refills   • FLUoxetine (PROzac) 40 MG capsule 30 capsule 2     Sig: Take 1 capsule by mouth Daily.        Pharmacy where request should be sent: [facility] - [facility] [address] - [phone] [facility] [phone] [place]     Additional details provided by patient: PATIENT STATES [facility] AS THEY WONT FILL THIS WITHOUT A CALL. PATIENT ASKING A COUPLE WEEKS SUPPLY BE SENT TO [address] [facility] - [phone]  - [phone] [place]  [phone] AS HE WILL BE LEAVING FOR VACATION AND OUT OF HIS MEDICATION    Does the patient have less than a 3 day supply:  [x] Yes  [] No    Nikkie Shah Rep   06/06/22 08:14 EDT

## 2022-08-08 ENCOUNTER — OFFICE VISIT (OUTPATIENT)
Dept: INTERNAL MEDICINE | Facility: CLINIC | Age: 62
End: 2022-08-08

## 2022-08-08 VITALS
HEART RATE: 84 BPM | RESPIRATION RATE: 16 BRPM | SYSTOLIC BLOOD PRESSURE: 118 MMHG | TEMPERATURE: 97.5 F | OXYGEN SATURATION: 97 % | DIASTOLIC BLOOD PRESSURE: 68 MMHG | HEIGHT: 68 IN | WEIGHT: 197.2 LBS | BODY MASS INDEX: 29.89 KG/M2

## 2022-08-08 DIAGNOSIS — M25.512 ACUTE PAIN OF LEFT SHOULDER: Primary | ICD-10-CM

## 2022-08-08 PROCEDURE — 99213 OFFICE O/P EST LOW 20 MIN: CPT | Performed by: INTERNAL MEDICINE
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
## 2022-08-10 ENCOUNTER — TELEPHONE (OUTPATIENT)
Dept: INTERNAL MEDICINE | Facility: CLINIC | Age: 62
End: 2022-08-10

## 2022-08-10 NOTE — TELEPHONE ENCOUNTER
Caller: Koffi Melo    Relationship: Self    Best call back number: [phone]    What is the medical concern/diagnosis: PAIN IN LEFT SHOULDER    Any additional details:  THE PATIENT STATES THAT HE WAS SEEN ON Monday HE STATES THAT THE DOCTOR WAS GOING TO REFER HIM TO A SPECIALIST FOR HIS SHOULDER THE PATIENT WOULD LIKE TO KNOW IF THE REFERRAL HAS BEEN COMPLETED YET

## 2022-08-25 ENCOUNTER — OFFICE VISIT (OUTPATIENT)
Dept: ORTHOPEDIC SURGERY | Facility: CLINIC | Age: 62
End: 2022-08-25

## 2022-08-25 VITALS
SYSTOLIC BLOOD PRESSURE: 110 MMHG | HEIGHT: 68 IN | BODY MASS INDEX: 29.86 KG/M2 | WEIGHT: 197 LBS | DIASTOLIC BLOOD PRESSURE: 70 MMHG

## 2022-08-25 DIAGNOSIS — M75.42 IMPINGEMENT SYNDROME OF LEFT SHOULDER: ICD-10-CM

## 2022-08-25 DIAGNOSIS — S46.002A ROTATOR CUFF INJURY, LEFT, INITIAL ENCOUNTER: Primary | ICD-10-CM

## 2022-08-25 DIAGNOSIS — M75.52 BURSITIS OF LEFT SHOULDER: ICD-10-CM

## 2022-08-25 DIAGNOSIS — M75.22 BICEPS TENDINITIS OF LEFT UPPER EXTREMITY: ICD-10-CM

## 2022-08-25 PROCEDURE — 99204 OFFICE O/P NEW MOD 45 MIN: CPT | Performed by: ORTHOPAEDIC SURGERY

## 2022-08-25 RX ORDER — METHYLPREDNISOLONE 4 MG/1
TABLET ORAL
Qty: 1 EACH | Refills: 0 | Status: SHIPPED | OUTPATIENT
Start: 2022-08-25 | End: 2022-09-14
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
## 2022-09-02 RX ORDER — BUPROPION HYDROCHLORIDE 300 MG/1
TABLET ORAL
Qty: 90 TABLET | Refills: 1 | Status: SHIPPED | OUTPATIENT
Start: 2022-09-02 | End: 2023-03-01

## 2022-09-06 ENCOUNTER — TELEPHONE (OUTPATIENT)
Dept: ORTHOPEDIC SURGERY | Facility: CLINIC | Age: 62
End: 2022-09-06

## 2022-09-06 DIAGNOSIS — R07.2 RETROSTERNAL CHEST PAIN: ICD-10-CM

## 2022-09-06 DIAGNOSIS — M54.50 ACUTE LOW BACK PAIN WITHOUT SCIATICA, UNSPECIFIED BACK PAIN LATERALITY: ICD-10-CM

## 2022-09-06 DIAGNOSIS — M54.2 NECK PAIN: ICD-10-CM

## 2022-09-06 RX ORDER — MELOXICAM 15 MG/1
15 TABLET ORAL DAILY PRN
Qty: 30 TABLET | Refills: 2 | Status: SHIPPED | OUTPATIENT
Start: 2022-09-06

## 2022-09-06 NOTE — TELEPHONE ENCOUNTER
Anastasiia,    Patient has received Meloxicam previously from Dr. Crisostomo.  Will we provide a prescription or does he need to request through them?    Thank you,    Mireille ROBERTS(R)

## 2022-09-07 NOTE — TELEPHONE ENCOUNTER
Spoke with patient and relayed what Anastasiia said about the Meloxicam.  He verbalized understanding.  He asked about a sooner MRI appointment and I provided Central Scheduling's number.      Mireille ROBERTS(R)

## 2022-09-13 ENCOUNTER — HOSPITAL ENCOUNTER (OUTPATIENT)
Dept: MRI IMAGING | Facility: HOSPITAL | Age: 62
Discharge: HOME | End: 2022-09-13
Admitting: ORTHOPAEDIC SURGERY

## 2022-09-13 ENCOUNTER — TELEPHONE (OUTPATIENT)
Dept: ORTHOPEDIC SURGERY | Facility: CLINIC | Age: 62
End: 2022-09-13

## 2022-09-13 DIAGNOSIS — M75.22 BICEPS TENDINITIS OF LEFT UPPER EXTREMITY: ICD-10-CM

## 2022-09-13 DIAGNOSIS — M75.52 BURSITIS OF LEFT SHOULDER: ICD-10-CM

## 2022-09-13 DIAGNOSIS — M75.42 IMPINGEMENT SYNDROME OF LEFT SHOULDER: ICD-10-CM

## 2022-09-13 DIAGNOSIS — S46.002A ROTATOR CUFF INJURY, LEFT, INITIAL ENCOUNTER: ICD-10-CM

## 2022-09-13 PROCEDURE — 73221 MRI JOINT UPR EXTREM W/O DYE: CPT

## 2022-09-13 NOTE — TELEPHONE ENCOUNTER
----- Message from Nikkie Tejeda sent at 9/13/2022 12:53 PM EDT -----    ----- Message -----  From: Duke Benson MD  Sent: 9/13/2022  12:18 PM EDT  To: Nikkie Tejeda    I am happy to see the patient for MRI review at his convenience.  No appointment is currently in  ----- Message -----  From: Shanelle Rad Results Marvin In  Sent: 9/13/2022   9:56 AM EDT  To: Duke Benson MD

## 2022-09-14 ENCOUNTER — LAB (OUTPATIENT)
Dept: LAB | Facility: HOSPITAL | Age: 62
End: 2022-09-14

## 2022-09-14 ENCOUNTER — OFFICE VISIT (OUTPATIENT)
Dept: INTERNAL MEDICINE | Facility: CLINIC | Age: 62
End: 2022-09-14

## 2022-09-14 VITALS
SYSTOLIC BLOOD PRESSURE: 118 MMHG | HEIGHT: 67 IN | WEIGHT: 191 LBS | RESPIRATION RATE: 16 BRPM | DIASTOLIC BLOOD PRESSURE: 68 MMHG | HEART RATE: 72 BPM | TEMPERATURE: 98.4 F | BODY MASS INDEX: 29.98 KG/M2

## 2022-09-14 DIAGNOSIS — Z00.00 PHYSICAL EXAM: Primary | ICD-10-CM

## 2022-09-14 DIAGNOSIS — Z12.5 PROSTATE CANCER SCREENING: ICD-10-CM

## 2022-09-14 DIAGNOSIS — K21.9 GASTROESOPHAGEAL REFLUX DISEASE WITHOUT ESOPHAGITIS: ICD-10-CM

## 2022-09-14 DIAGNOSIS — E78.5 HYPERLIPIDEMIA, UNSPECIFIED HYPERLIPIDEMIA TYPE: ICD-10-CM

## 2022-09-14 DIAGNOSIS — R82.998 LEUKOCYTES IN URINE: ICD-10-CM

## 2022-09-14 DIAGNOSIS — Z00.00 PHYSICAL EXAM: ICD-10-CM

## 2022-09-14 LAB
ALBUMIN SERPL-MCNC: 4.2 G/DL (ref 3.5–5.2)
ALBUMIN/GLOB SERPL: 1.8 G/DL
ALP SERPL-CCNC: 60 U/L (ref 39–117)
ALT SERPL W P-5'-P-CCNC: 18 U/L (ref 1–41)
ANION GAP SERPL CALCULATED.3IONS-SCNC: 9.9 MMOL/L (ref 5–15)
AST SERPL-CCNC: 29 U/L (ref 1–40)
BILIRUB BLD-MCNC: NEGATIVE MG/DL
BILIRUB SERPL-MCNC: 0.4 MG/DL (ref 0–1.2)
BUN SERPL-MCNC: 17 MG/DL (ref 6–20)
BUN/CREAT SERPL: 14.3 (ref 7–25)
CALCIUM SPEC-SCNC: 9 MG/DL (ref 8.6–10.5)
CHLORIDE SERPL-SCNC: 103 MMOL/L (ref 98–107)
CHOLEST SERPL-MCNC: 188 MG/DL (ref 0–200)
CLARITY, POC: ABNORMAL
CO2 SERPL-SCNC: 24.1 MMOL/L (ref 22–29)
COLOR UR: ABNORMAL
CREAT SERPL-MCNC: 1.19 MG/DL (ref 0.76–1.27)
EGFRCR SERPLBLD CKD-EPI 2021: 70.4 ML/MIN/1.73
EXPIRATION DATE: ABNORMAL
GLOBULIN UR ELPH-MCNC: 2.3 GM/DL
GLUCOSE SERPL-MCNC: 80 MG/DL (ref 65–99)
GLUCOSE UR STRIP-MCNC: NEGATIVE MG/DL
HDLC SERPL-MCNC: 47 MG/DL (ref 40–60)
KETONES UR QL: NEGATIVE
LDLC SERPL CALC-MCNC: 121 MG/DL (ref 0–100)
LDLC/HDLC SERPL: 2.52 {RATIO}
LEUKOCYTE EST, POC: ABNORMAL
Lab: ABNORMAL
NITRITE UR-MCNC: NEGATIVE MG/ML
PH UR: 5 [PH] (ref 5–8)
POTASSIUM SERPL-SCNC: 4 MMOL/L (ref 3.5–5.2)
PROT SERPL-MCNC: 6.5 G/DL (ref 6–8.5)
PROT UR STRIP-MCNC: NEGATIVE MG/DL
PSA SERPL-MCNC: 2.03 NG/ML (ref 0–4)
RBC # UR STRIP: NEGATIVE /UL
SODIUM SERPL-SCNC: 137 MMOL/L (ref 136–145)
SP GR UR: 1.02 (ref 1–1.03)
TRIGL SERPL-MCNC: 112 MG/DL (ref 0–150)
TSH SERPL DL<=0.05 MIU/L-ACNC: 1.18 UIU/ML (ref 0.27–4.2)
UROBILINOGEN UR QL: NORMAL
VLDLC SERPL-MCNC: 20 MG/DL (ref 5–40)

## 2022-09-14 PROCEDURE — 99396 PREV VISIT EST AGE 40-64: CPT | Performed by: INTERNAL MEDICINE

## 2022-09-14 PROCEDURE — G0103 PSA SCREENING: HCPCS

## 2022-09-14 PROCEDURE — 80061 LIPID PANEL: CPT

## 2022-09-14 PROCEDURE — 81003 URINALYSIS AUTO W/O SCOPE: CPT | Performed by: INTERNAL MEDICINE

## 2022-09-14 PROCEDURE — 87086 URINE CULTURE/COLONY COUNT: CPT | Performed by: INTERNAL MEDICINE

## 2022-09-14 PROCEDURE — 80050 GENERAL HEALTH PANEL: CPT

## 2022-09-14 NOTE — PROGRESS NOTES
Chief Complaint   Patient presents with   • Annual Exam       History of Present Illness      The patient presents for an established patient physical examination and health maintenance visit.    The patient presents for a follow-up related to hyperlipidemia. He is following a low fat diet. He reports that he is exercising. He is not taking medication for hyperlipidemia. He denies chest pain, shortness of breath, orthopnea, paroxysmal nocturnal dyspnea, dyspnea on exertion, edema, palpitations or syncope.    The patient presents for a follow-up related to GERD. The patient is not on medication for his gastroesophageal reflux. He reports no abdominal pain, belching, diarrhea, dysphagia, early satiety, heartburn, hoarseness, nausea, odynophagia, rectal bleeding, vomiting or weight loss. The GERD has no known aggravating factors.    Review of Systems    CONSTITUTIONAL- Denies Fever, Chills, Sweats, Fatigue, Weakness or Malaise.    HENT- Denies Nasal Discharge, Sore Throat, Ear Pain, Ear Drainage, Headache, Sinus Pain, Nasal Congestion, Decreased Hearing or Tinnitus.    GASTROINTESTINAL- Denies Constipation.    PULMONARY- Denies Wheezing, Sputum Production, Cough, Hemoptysis or Pleuritic Chest Pain.    CARDIOVASCULAR- Denies Claudication or Irregular Heart Beat.    Medications      Current Outpatient Medications:   •  aspirin 81 MG EC tablet, Take 81 mg by mouth Daily., Disp: , Rfl:   •  buPROPion XL (WELLBUTRIN XL) 300 MG 24 hr tablet, TAKE 1 TABLET DAILY, Disp: 90 tablet, Rfl: 1  •  cyclobenzaprine (FLEXERIL) 10 MG tablet, Take 1 tablet by mouth At Night As Needed for Muscle Spasms., Disp: 30 tablet, Rfl: 1  •  FLUoxetine (PROzac) 40 MG capsule, Take 1 capsule by mouth Daily., Disp: 90 capsule, Rfl: 1  •  meloxicam (MOBIC) 15 MG tablet, Take 1 tablet by mouth Daily As Needed for Moderate Pain., Disp: 30 tablet, Rfl: 2     Allergies    Allergies   Allergen Reactions   • Hydrocodone-Acetaminophen Itching       Problem  "List    Patient Active Problem List   Diagnosis   • Anxiety   • PFO (patent foramen ovale)   • History of CVA (cerebrovascular accident)   • Obesity   • Concentration deficit   • Microangiopathy (HCC)   • Pre-operative clearance   • Rotator cuff injury, left, initial encounter   • Biceps tendinitis of left upper extremity   • Impingement syndrome of left shoulder   • Bursitis of left shoulder       Medications, Allergies, Problems List and Past History were reviewed and updated.    Physical Examination    /68 (BP Location: Left arm, Patient Position: Sitting, Cuff Size: Adult)   Pulse 72   Temp 98.4 °F (36.9 °C) (Infrared)   Resp 16   Ht 170.2 cm (67\")   Wt 86.6 kg (191 lb)   BMI 29.91 kg/m²     HEENT: Head- Normocephalic Atraumatic. Facies- Within normal limits. Pinnas- Normal texture and shape bilaterally. Canals- Normal bilaterally. TMs- Normal bilaterally. Nares- Patent bilaterally. Nasal Septum- is normal. There is no tenderness to palpation over the frontal or maxillary sinuses. Lids- Normal bilaterally. Conjunctiva- Clear bilaterally. Sclera- Anicteric bilaterally. Oropharynx- Moist with no lesions. Tonsils- No enlargement, erythema or exudate.    Neck: Thyroid- non enlarged, symmetric and has no nodules. No bruits are detected. ROM- Normal Range of Motion with no rigidity.    Lungs: Auscultation- Clear to auscultation bilaterally. There are no retractions, clubbing or cyanosis. The Expiratory to Inspiratory ratio is equal.    Lymph Nodes: Cervical- no enlarged lymph nodes noted. Clavicular- no enlarged supraclavicular lymph nodes noted. Axillary- no enlarged axillary lymph nodes noted. Inguinal- no enlarged inguinal lymph nodes noted.    Cardiovascular: There are no carotid bruits. Heart- Normal Rate with Regular rhythm and no murmurs. There are no gallops. There are no rubs. In the lower extremities there is no edema. The upper extremities do not have edema.    Abdomen: Soft, benign, non-tender " with no masses, hernias, organomegaly or scars.    Male Genitourinary: The penis is circumcised with testicles found in the scrotum bilaterally. Testicular Size is normal bilaterally. The penis has no anatomic abnormalities.    Rectal: reveals normal external sphincter tone with no external lesions.    Prostate: The prostate gland is symmetric and smooth with no nodularity.    Dermatologic: The patient has no worrisome or suspicious skin lesions noted.    Impression and Assessment    Normal Physical Examination.    Encounter for Screening for Malignant Neoplasm of the Prostate.    Hyperlipidemia.    Gastroesophageal Reflux Disease.    Plan    Gastroesophageal Reflux Disease Plan: The condition is stable. No change is needed in the current plan.    Hyperlipidemia Plan: The patient was instructed to exercise daily and eat a low fat diet.    Counseling was provided regarding: Adequate Aerobic Exercise, Dental Visits, Flossing Teeth, Heart Healthy Diet and Seat Belt Utilization.    The following was ordered for screening and health maintenance: PSA.       Immunizations Ordered and Administered: None.    Diagnoses and all orders for this visit:    1. Physical exam (Primary)  -     TSH; Future  -     POC Urinalysis Dipstick, Automated; Future    2. Hyperlipidemia, unspecified hyperlipidemia type  -     Comprehensive Metabolic Panel; Future  -     Lipid Panel; Future    3. Gastroesophageal reflux disease without esophagitis  -     CBC & Differential; Future    4. Prostate cancer screening  -     PSA Screen; Future        Return to Office    The patient was instructed to return for follow-up in 6 months. The patient was instructed to return sooner if the condition changes, worsens, or does not resolve.

## 2022-09-15 LAB
BACTERIA SPEC AEROBE CULT: NO GROWTH
BASOPHILS # BLD AUTO: 0.03 10*3/MM3 (ref 0–0.2)
BASOPHILS NFR BLD AUTO: 0.3 % (ref 0–1.5)
DEPRECATED RDW RBC AUTO: 42.8 FL (ref 37–54)
EOSINOPHIL # BLD AUTO: 0.09 10*3/MM3 (ref 0–0.4)
EOSINOPHIL NFR BLD AUTO: 1 % (ref 0.3–6.2)
ERYTHROCYTE [DISTWIDTH] IN BLOOD BY AUTOMATED COUNT: 12.5 % (ref 12.3–15.4)
HCT VFR BLD AUTO: 44.4 % (ref 37.5–51)
HGB BLD-MCNC: 14.6 G/DL (ref 13–17.7)
IMM GRANULOCYTES # BLD AUTO: 0.02 10*3/MM3 (ref 0–0.05)
IMM GRANULOCYTES NFR BLD AUTO: 0.2 % (ref 0–0.5)
LYMPHOCYTES # BLD AUTO: 1.61 10*3/MM3 (ref 0.7–3.1)
LYMPHOCYTES NFR BLD AUTO: 18.3 % (ref 19.6–45.3)
MCH RBC QN AUTO: 30.4 PG (ref 26.6–33)
MCHC RBC AUTO-ENTMCNC: 32.9 G/DL (ref 31.5–35.7)
MCV RBC AUTO: 92.3 FL (ref 79–97)
MONOCYTES # BLD AUTO: 0.62 10*3/MM3 (ref 0.1–0.9)
MONOCYTES NFR BLD AUTO: 7 % (ref 5–12)
NEUTROPHILS NFR BLD AUTO: 6.43 10*3/MM3 (ref 1.7–7)
NEUTROPHILS NFR BLD AUTO: 73.2 % (ref 42.7–76)
NRBC BLD AUTO-RTO: 0 /100 WBC (ref 0–0.2)
PLATELET # BLD AUTO: 202 10*3/MM3 (ref 140–450)
PMV BLD AUTO: 10 FL (ref 6–12)
RBC # BLD AUTO: 4.81 10*6/MM3 (ref 4.14–5.8)
WBC NRBC COR # BLD: 8.8 10*3/MM3 (ref 3.4–10.8)

## 2022-09-20 ENCOUNTER — OFFICE VISIT (OUTPATIENT)
Dept: ORTHOPEDIC SURGERY | Facility: CLINIC | Age: 62
End: 2022-09-20

## 2022-09-20 ENCOUNTER — APPOINTMENT (OUTPATIENT)
Dept: MRI IMAGING | Facility: HOSPITAL | Age: 62
End: 2022-09-20

## 2022-09-20 VITALS
SYSTOLIC BLOOD PRESSURE: 134 MMHG | DIASTOLIC BLOOD PRESSURE: 84 MMHG | HEIGHT: 67 IN | WEIGHT: 191 LBS | BODY MASS INDEX: 29.98 KG/M2

## 2022-09-20 DIAGNOSIS — M75.22 BICEPS TENDINITIS OF LEFT UPPER EXTREMITY: Primary | ICD-10-CM

## 2022-09-20 DIAGNOSIS — S46.012A TRAUMATIC INCOMPLETE TEAR OF LEFT ROTATOR CUFF, INITIAL ENCOUNTER: ICD-10-CM

## 2022-09-20 DIAGNOSIS — S43.003A SUBLUXATION OF TENDON OF LONG HEAD OF BICEPS: ICD-10-CM

## 2022-09-20 DIAGNOSIS — S43.432A SUPERIOR GLENOID LABRUM LESION OF LEFT SHOULDER, INITIAL ENCOUNTER: ICD-10-CM

## 2022-09-20 PROCEDURE — 99214 OFFICE O/P EST MOD 30 MIN: CPT | Performed by: ORTHOPAEDIC SURGERY
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
## 2022-10-17 ENCOUNTER — DOCUMENTATION (OUTPATIENT)
Dept: ORTHOPEDIC SURGERY | Facility: CLINIC | Age: 62
End: 2022-10-17

## 2022-10-17 ENCOUNTER — OUTSIDE FACILITY SERVICE (OUTPATIENT)
Dept: ORTHOPEDIC SURGERY | Facility: CLINIC | Age: 62
End: 2022-10-17

## 2022-10-17 DIAGNOSIS — S46.012A TRAUMATIC INCOMPLETE TEAR OF LEFT ROTATOR CUFF, INITIAL ENCOUNTER: ICD-10-CM

## 2022-10-17 DIAGNOSIS — S43.003A SUBLUXATION OF TENDON OF LONG HEAD OF BICEPS: ICD-10-CM

## 2022-10-17 DIAGNOSIS — S43.432A SUPERIOR GLENOID LABRUM LESION OF LEFT SHOULDER, INITIAL ENCOUNTER: ICD-10-CM

## 2022-10-17 DIAGNOSIS — M75.22 BICEPS TENDINITIS OF LEFT UPPER EXTREMITY: Primary | ICD-10-CM

## 2022-10-17 PROCEDURE — 29828 SHO ARTHRS SRG BICP TENODSIS: CPT

## 2022-10-17 PROCEDURE — 29826 SHO ARTHRS SRG DECOMPRESSION: CPT

## 2022-10-17 PROCEDURE — 29827 SHO ARTHRS SRG RT8TR CUF RPR: CPT

## 2022-10-17 PROCEDURE — 29828 SHO ARTHRS SRG BICP TENODSIS: CPT | Performed by: ORTHOPAEDIC SURGERY

## 2022-10-17 PROCEDURE — 29826 SHO ARTHRS SRG DECOMPRESSION: CPT | Performed by: ORTHOPAEDIC SURGERY

## 2022-10-17 PROCEDURE — 29827 SHO ARTHRS SRG RT8TR CUF RPR: CPT | Performed by: ORTHOPAEDIC SURGERY

## 2022-10-17 RX ORDER — OXYCODONE AND ACETAMINOPHEN 7.5; 325 MG/1; MG/1
1 TABLET ORAL EVERY 4 HOURS PRN
Qty: 42 TABLET | Refills: 0 | Status: SHIPPED | OUTPATIENT
Start: 2022-10-17 | End: 2022-10-24

## 2022-10-17 RX ORDER — ONDANSETRON 4 MG/1
4 TABLET, FILM COATED ORAL EVERY 6 HOURS PRN
Qty: 30 TABLET | Refills: 1 | Status: SHIPPED | OUTPATIENT
Start: 2022-10-17 | End: 2022-10-25

## 2022-10-17 NOTE — PROGRESS NOTES
Operative Report     Side/SHOULDER: LEFT SHOULDER    LOCATION: [facility]  [address] [place] [address]    [facility] OPERATIVE REPORT    Surgeon: Duke Benson MD     Assistant: VENECIA Garcia     The skilled assistance of the above noted first assistant was necessary during this complex surgical procedure.  The surgical assistant assisted with every aspect of the operation including, but not limited to, proper and safe positioning of the patient, obtaining adequate surgical exposure, manipulation of surgical instruments, suture management, surgical knot tying when necessary, the continual process of hemostasis during the procedure itself in addition to surgical wound closure and removal of the patient from the operating table and returning the patient back to the [facility].  The assistance of the surgical assistant allowed me to perform the most sensitive and technical potions of this operation using 2 hands, thus enhancing efficiency and patient safety.  This would not be possible without the help of a skilled assistant familiar with the procedure and capable of safely performing the aforementioned tasks.        Preoperative Diagnosis:  1.     Rotator cuff tear - upper border subscapularis tear treated with arthroscopic rotator cuff repair - CPT 34599  2.     Biceps tendinopathy and biceps tearing intra-articular and extra-articular- treated with arthroscopic biceps tenodesis - CPT 55269  3.     Shoulder impingement syndrome - treated with arthroscopic subacromial decompression with acromioplasty - CPT 29817     Postoperative Diagnosis:  1.     SAME as preoperative diagnoses     Procedure:  1.     Arthroscopic rotator cuff repair (1 anchor upper border subscapularis tear) - CPT 09002  2.     Arthroscopic biceps tenodesis (8-8)- CPT 44386  3.     Arthroscopic subacromial decompression with acromioplasty - CPT 85897        Admission status: elective  outpatient surgery     COVID-19 Statement: The patient understands that the surgery is elective surgery.  Patient is aware of the ongoing COVID-19 pandemic and potential implications.     Complications: None     EBL: 10mL     Specimen: NONE     Anesthesia: general anesthesia     Block: regional interscalene block with single shot per anesthesia     Antibiotics: weight based IV antibiotics infused prior to incision     Time out: Time out was called and the patient, side, site, and intended procedures were confirmed.     Counts: Needle and sponge counts were correct prior to closure.     DVT prophylaxis: The patient will be weight bearing as tolerated on bilateral lower extremities postoperatively with no additional chemical DVT prophylaxis indicated.  He can resume his home aspirin treatment.     Marked: The patient was marked with an indelible marker in the preoperative holding area confirming the correct side and site.     History & Physical:   A history and physical examination was completed and updated in the preoperative holding area.     Consent: The patient signed the consent form for surgery with an understanding of the risks and benefits as outlined in the clinic and in the preoperative holding area.     Risks and Benefits: Specific risks and benefits discussed included - pain, bleeding, infection, injury to nerves or blood vessels, fracture, stiffness, failure to heal, revision surgery, deformity of biceps or asymmetry, complications of the block, anesthetic complications, and medical complications associated with surgery.  COVID-19 awareness.        Indications for surgery:  The patient has history, physical examination, and radiographic findings confirming the diagnoses.  The patient has persistent pain and functional loss unresponsive to non-operative treatment consisting of selective rest/activity modification, medications, and exercises.  MRI documented the status of the rotator cuff - rotator cuff  tearing was noted --subscapularis upper border tearing.     Impingement syndrome - the patient had history and clinical exam findings consistent with shoulder impingement syndrome.  Additionally, the patient had subacromial bursitis which was encountered during the arthroscopic shoulder procedure.  Subacromial decompression with minimal acromioplasty was indicated as part of the treatment of impingement syndrome, and additionally to enhance arthroscopic visualization to enable minimally invasive, arthroscopic rotator cuff repair.    I personally corresponded with 2 of our [facility] radiologist with regards to his upper border tearing of the subscapularis.  This was indeed confirmed at the time of surgery.  He had significant partial tearing of the biceps tendon intra-articularly and extra-articular Willy.  He also had tearing full-thickness of the upper border necessitating single anchor repair of the subscapularis.     He required preauthorization from the insurance coverage team.  I personally spoke with the orthopedic surgeon and explained the significant involvement of the subscapularis and biceps tendon --the subscapularis appeared to have more than just intrasubstance tearing and also had upper border tearing.  Ultimately provisional authorization was provided to enable proper determination at the time of surgery to properly treat the significant pathology that was noted.  He indeed had tearing of the subscapularis which was noted upon my personal MRI review and he also had biceps tearing intraoperatively that was substantial as well.     Operative Findings:  Rotator Cuff Tissue Quality: Intrasubstance tearing of the subscapularis but also upper border tearing off of the anatomical footprint     Status of the Rotator Cuff:  Subscapularis tearing of the upper border     Rotator Cuff Repair Construct:  1 anchor arthroscopic Rotator Cuff Repair -subscapularis     Rotator Cuff Implants:  1 anchor-[employer] 4.5mm  Healicoil PEEK       Bone Quality: Good     Labrum: Degenerative labral tearing including SLAP 2     Biceps: tendinopathy and synovitis and intra-articular and extra-articular partial tearing biceps tendon which was fairly extensive-relatively poor quality tendon following extensive tearing in the joint and outside the joint     Biceps Tenodesis Construct:  Suprapectoral biceps tenodesis in the bicipital groove     Biceps Tendon Implant:  Arthrex SwiveLock Biceps Tenodesis BioComposite screw  Drill Size: 8mm  Screw Size: 8mm     Humeral Head: 1  Glenoid: 1     Contracture: Negative  Pathological laxity: Negative     Procedure in detail:  Regional interscalene block was completed in the preoperative area by the anesthesia team.  The patient was supine on the operative table and the anesthesia team initiated general anesthesia.  The patient was placed in a modified beach chair position with the neck secured in neutral alignment and all bony prominences were well padded.     The shoulder was assessed with an examination under anesthesia.     The operative extremity was cleaned with alcohol and then the extremity was prepped and draped in the standard fashion.  The surgical arm was placed into a well-padded arm prather. A standard posterior portal was created with an incision made 1 cm inferior 1 cm medial to the posterolateral acromial corner.  A blunt metal trocar and cannula were inserted into the glenohumeral joint.  The arthroscopic pump was connected and the above findings and diagnoses were noted as part of the diagnostic shoulder arthroscopy.       A spinal needle was used to localize the anterior portal position in the rotator interval. A 5.5mm plastic cannula and trocar were inserted followed by a 4.5mm shaver/cautery device.  The shaver was used for debridement in the glenohumeral joint.  Arthroscopic scissors were used to perform biceps tenotomy of the pathologic biceps tendon prior to subsequent biceps  tenodesis.  The remaining intra-articular portion of the biceps tendon was debrided using the shaver/cautery device.  Significant biceps tearing was noted within the joint and outside the joint.    Significant upper border tearing of the subscapularis was noted peeling off superiorly and also intrasubstance tearing.    I utilized spinal needle to make an additional anterior portal within the interval.  I cleaned off the anatomical insertion of the subscapularis footprint.  I placed a single 4.5 mm anchor within the anatomical footprint where the significant subscapularis peeling off was noted of the upper border.  I sequentially placed all 4 suture limbs in a mattress fashion utilizing a self passing to the left device.  I subsequently tied the suture limbs sequentially with proper tensioning and excellent restoration down to the anatomical footprint.  The subscapularis was closely inspected and dynamically tested and noted to be stable with excellent footprint coverage.     The arthroscope and metal cannula were then removed in order to transition to the subacromial space.  The blunt metal trocar and cannula were inserted into the subacromial space and the lateral portal site identified with a spinal needle. Bursitis was noted in the subacromial space and impacted visualization of the rotator cuff.  The 4.5mm shaver/cautery device was used to clear the subacromial space until the acromion could be identified and bursal resection was completed in the setting of bursitis and CA ligament tearing.  The shaver was used to remove fibrous tissue from the acromial undersurface until the anterolateral and anterior medial corners of the acromion were clearly identified.  The coracoacromial ligament was not released but the CA ligament tearing was debrided.  The shaver was used to perform a minimal acromioplasty.  The shaver/cautery device was used to perform the subacromial decompression with minimal acromioplasty.    The  rotator cuff was closely inspected and the subacromial space and no tearing necessitating repair of the supra or infraspinatus.     I turned my attention to the arthroscopic biceps tenodesis. The arm was placed in a slightly external rotator position and the elbow flexed and shoulder forward flexed into a biceps tendon repair position.  The biceps tendon repair position in achieved in order to try to maintain proper biceps tendon length-tension relationship during the repair.  The biceps sheath was opened using the shaver/cautery device and the pathologic biceps tendon was visualized.  The appropriately sized drill bit was used to create a drill hole for the tendon above the pectoralis major tendon and within the bicipital groove.  The biceps tendon was placed into the drill hole and the screw inserted and tested.  The arthroscopic biceps tenodesis was completed and the repair was noted to be dynamically stable with a solid repair.    The incisions were closed with nylon suture and steri-strips were placed over the incisions.  A sterile dressing was applied followed by a neutral rotation sling.  The patient tolerated the procedure well and was transported to the recovery room in satisfactory condition.        Postoperative Plan:  Neutral rotation sling  Non-weight bearing  No biceps loading  Pendulums, elbow, wrist, and hand exercises encouraged  Clinic follow-up appointment scheduled for 2 weeks after surgery    Rotator Cuff Repair - POSTOPERATIVE PLAN:  Follow-up in the office in 2 weeks with 1 view of the operative shoulder at that time  Sutures: Nylon sutures to come out in 2 weeks  DVT prophylaxis: Okay to resume his daily aspirin  Weightbearing: Nonweightbearing on operative extremity  Sling for 3 to 4 weeks following the surgery  Physical therapy: Formal outpatient physical therapy will be provided at the 2-week appointment in the office.  Rotator cuff repair protocol    Electronically signed by Duke NIX  MD Marcelino, 10/17/22, 10:26 AM EDT.

## 2022-10-28 ENCOUNTER — TELEPHONE (OUTPATIENT)
Dept: INTERNAL MEDICINE | Facility: CLINIC | Age: 62
End: 2022-10-28

## 2022-10-28 NOTE — TELEPHONE ENCOUNTER
Caller: Koffi Melo    Relationship: Self    Best call back number: [phone]    What form or medical record are you requesting: ALL MEDICAL RECORDS February TO October FROM PCP AND DR. NICOLE POSADA     Who is requesting this form or medical record from you: PERSONAL USE     How would you like to receive the form or medical records (pick-up, mail, fax): PICK-UP   If fax, what is the fax number:   If mail, what is the address:   If pick-up, provide patient with address and location details    Timeframe paperwork needed: 10/31/22    Additional notes: PATIENT STATES IS PCP DOES NOT HAVE ACCESS TO DR. NICOLE POSADA RECORDS THAT IS OKAY. PATIENT STATES IF PCP DOES NOT HAVE ACCESS TO DR. NICOLE POSADA' RECORDS TO PLEASE CALL HIM SO HE CAN REQUEST THE RECORDS FROM HIS OFFICE AT HIS APPOINTMENT ON 10/31/22.

## 2022-10-31 NOTE — TELEPHONE ENCOUNTER
Please call the patient and let him know he has to sign a release before we can release any records.   Problem: Patient Care Overview (Adult)  Goal: Plan of Care Review  Outcome: Ongoing (interventions implemented as appropriate)

## 2022-11-01 ENCOUNTER — OFFICE VISIT (OUTPATIENT)
Dept: ORTHOPEDIC SURGERY | Facility: CLINIC | Age: 62
End: 2022-11-01

## 2022-11-01 VITALS — TEMPERATURE: 96.9 F

## 2022-11-01 DIAGNOSIS — S46.012A TRAUMATIC INCOMPLETE TEAR OF LEFT ROTATOR CUFF, INITIAL ENCOUNTER: ICD-10-CM

## 2022-11-01 DIAGNOSIS — Z98.890 S/P LEFT ROTATOR CUFF REPAIR: Primary | ICD-10-CM

## 2022-11-01 DIAGNOSIS — M75.52 BURSITIS OF LEFT SHOULDER: ICD-10-CM

## 2022-11-01 DIAGNOSIS — M75.22 BICEPS TENDINITIS OF LEFT UPPER EXTREMITY: ICD-10-CM

## 2022-11-01 DIAGNOSIS — M75.42 IMPINGEMENT SYNDROME OF LEFT SHOULDER: ICD-10-CM

## 2022-11-01 PROCEDURE — 99024 POSTOP FOLLOW-UP VISIT: CPT | Performed by: PHYSICIAN ASSISTANT

## 2022-11-01 RX ORDER — FLUOROMETHOLONE 0.1 %
SUSPENSION, DROPS(FINAL DOSAGE FORM)(ML) OPHTHALMIC (EYE)
COMMUNITY
Start: 2022-10-31

## 2022-11-01 RX ORDER — HYDROCODONE BITARTRATE AND ACETAMINOPHEN 5; 325 MG/1; MG/1
1 TABLET ORAL EVERY 6 HOURS PRN
Qty: 30 TABLET | Refills: 0 | Status: SHIPPED | OUTPATIENT
Start: 2022-11-01 | End: 2023-03-31

## 2022-11-01 RX ORDER — HYDROCODONE BITARTRATE AND ACETAMINOPHEN 10; 325 MG/1; MG/1
1 TABLET ORAL EVERY 6 HOURS PRN
COMMUNITY
End: 2022-11-01

## 2022-11-01 RX ORDER — IBUPROFEN 200 MG
200 TABLET ORAL EVERY 6 HOURS PRN
COMMUNITY

## 2022-11-01 NOTE — PROGRESS NOTES
[facility] Orthopaedic Surgery Clinic Note        Subjective     Post-op (2 week s/p Left RCR Repair w Biceps Tenodesis 10/17/22)       HPI    Koffi Melo is a 61 y.o. male.  Patient presents for their initial postop visit following left shoulder arthroscopy with arthroscopic rotator cuff repair, bicep tenodesis, SAD with acromioplasty performed on the above date by Dr. Benson.    Pain scale: 6/10.  No reported numbness or tingling.  Patient wearing postoperative sling as directed.    Patient denies any fever, chills, night sweats or other constitutional symptoms.          Objective      Physical Exam  Temp 96.9 °F (36.1 °C)     There is no height or weight on file to calculate BMI.        Ortho Exam  Peripheral Vascular   Left Upper Extremity    No cyanotic nail beds    Pink nail beds and rapid capillary refill   Palpation    Radial Pulse - Bilaterally normal    Musculoskeletal   Upper Extremity   Left Shoulder    Inspection and palpation: Mild ecchymosis noted anterior shoulder and upper arm.    Tenderness -moderate    Swelling -mild    Sensation - normal    Motor: Grossly intact axillary, MSC, radial, ulnar, median, AIN, PIN.    Sensory: Grossly intact to light touch axillary, MSC, radial, ulnar, median nerve distributions.    Vascular: 2+ radial pulse with brisk capillary refill noted and each digit.    Incision--healing appropriately with sutures in place.  No redness, warmth, drainage or evidence of infection    ROJM:   Left:   Elevation through flexion: PROM - 35 degrees    Left elbow  No evidence of Prabhjot deformity.      Imaging Reviewed:  Ordered left shoulder plain films.  Imaging read/interpreted by Dr. Benson.    Imaging: shoulder x-rays 1 view - AP x-ray views     Side: LEFT SHOULDER     Indication for shoulder x-ray 1 view: shoulder pain, postop evaluation following surgery     Comparison: the current xray was compared to preoperative imaging and indicates expected postoperative  changes.     Findings: No acute bony pathology. Well appearing and well positioned compared to preoperative imaging.  Located and no fracture noted.     I personally reviewed the above x-rays.      Assessment:  1. S/P left rotator cuff repair    2. Traumatic incomplete tear of left rotator cuff, initial encounter    3. Biceps tendinitis of left upper extremity        Plan:  Status post left shoulder arthroscopy with arthroscopic rotator cuff repair, bicep tenodesis, SAD with acromioplasty--stable.  Sutures were removed today.  Reviewed arthroscopic images with the patient.  Ordered formal PT--patient given copy of Dr. Benson's rehab protocol.  He will continue wearing postoperative sling as directed, this does include sleeping in it. May wean out when he is 4 weeks out from surgery.  Dr. Benson refilled patient's pain medication.  Follow up with Dr. Benson in 2 months.  Questions and concerns answered.    Patient was also examined by Dr. Benson and he agrees with the above assessment and plan.      Anastasiia Hammond PA-C  11/01/22  15:37 EDT      Dictated Utilizing Dragon Dictation.

## 2022-11-17 ENCOUNTER — TELEPHONE (OUTPATIENT)
Dept: ORTHOPEDIC SURGERY | Facility: CLINIC | Age: 62
End: 2022-11-17

## 2022-11-17 ENCOUNTER — OFFICE VISIT (OUTPATIENT)
Dept: ORTHOPEDIC SURGERY | Facility: CLINIC | Age: 62
End: 2022-11-17

## 2022-11-17 DIAGNOSIS — S46.012D TRAUMATIC INCOMPLETE TEAR OF LEFT ROTATOR CUFF, SUBSEQUENT ENCOUNTER: ICD-10-CM

## 2022-11-17 DIAGNOSIS — M75.22 BICEPS TENDINITIS OF LEFT UPPER EXTREMITY: ICD-10-CM

## 2022-11-17 DIAGNOSIS — Z98.890 S/P LEFT ROTATOR CUFF REPAIR: Primary | ICD-10-CM

## 2022-11-17 PROCEDURE — 99024 POSTOP FOLLOW-UP VISIT: CPT | Performed by: PHYSICIAN ASSISTANT

## 2022-11-17 NOTE — PROGRESS NOTES
[facility] Orthopaedic Surgery Clinic Note        Subjective     Post-op (2 week follow up -- 4 week s/p Left RCR Repair w Biceps Tenodesis 10/17/22)       HPI    Koffi Melo is a 61 y.o. male.  Patient presents today after falling 4-5 days ago on the lateral aspect of his left shoulder.  He was concerned he might have done something to his rotator cuff repair and wanted to have his shoulder evaluated.    Patient is status post left shoulder arthroscopy with arthroscopic rotator cuff repair, biceps tenodesis, SAD with acromioplasty by Dr. Benson on the above-stated date.    Pain scale: 3-4/10.  He states that since surgery his pain has improved.  He has been working with formal PT.        Objective      Physical Exam  There were no vitals taken for this visit.    There is no height or weight on file to calculate BMI.        Ortho Exam  Peripheral Vascular              Left Upper Extremity                          No cyanotic nail beds                          Pink nail beds and rapid capillary refill              Palpation                          Radial Pulse - Bilaterally normal     Musculoskeletal              Upper Extremity              Left Shoulder                          Inspection and palpation                          Tenderness -mild                          Swelling -none                          Sensation - normal                          Motor: Grossly intact axillary, MSC, radial, ulnar, median, AIN, PIN.                          Sensory: Grossly intact to light touch axillary, MSC, radial, ulnar, median nerve distributions.                          Vascular: 2+ radial pulse with brisk capillary refill noted and each digit.                          Incision: Portals are well-healed without redness, warmth, drainage or evidence of infection.        Left elbow    No evidence of Prabhjot deformity.       Imaging Reviewed:  Ordered left shoulder plain films.  Imaging read/interpreted by   Marcelino.    Imaging Results (Last 24 Hours)     Procedure Component Value Units Date/Time    XR Shoulder 1 View Left [[id number]] Resulted: 11/17/22 1335     Updated: 11/17/22 1335    Narrative:      Imaging: shoulder x-rays 1 view - AP x-ray views    Side: LEFT    Indication for shoulder x-ray 1 view: shoulder pain, postop evaluation   following surgery and subsequent fall    Comparison: the current xray was compared to preoperative and   postoperative imaging and indicates expected postoperative changes.    Findings: Patient suffered a fall after surgery fortunately no obvious   bony changes noted today.  Baseline degenerative AC joint changes but no   acute fracture noted.    I personally reviewed the above x-rays.            Assessment:  1. S/P left rotator cuff repair    2. Traumatic incomplete tear of left rotator cuff, subsequent encounter    3. Biceps tendinitis of left upper extremity        Plan:  Status post left shoulder arthroscopy with arthroscopic rotator cuff repair, bicep tenodesis, SAD with acromioplasty with recent history of fall--shoulder remains stable.  Continue with formal PT per Dr. Benson's rehab protocol.  Patient may begin weaning out of postoperative sling.  Continue with postoperative pain medication; however, recommend transition to OTC when/as able.  Keep follow-up appointment Dr. Benson 1/3/2023.  Questions and concerns answered.     Case was discussed with Dr. Benson who agrees with the above assessment and plan.      Anastasiia Hammond PA-C  11/17/22  15:38 EST      Dictated Utilizing Dragon Dictation.

## 2022-11-17 NOTE — TELEPHONE ENCOUNTER
Called patient and made him an appointment to come in today to have shoulder checked.     Dannielle
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
## 2022-12-28 ENCOUNTER — TELEPHONE (OUTPATIENT)
Dept: INTERNAL MEDICINE | Facility: CLINIC | Age: 62
End: 2022-12-28

## 2022-12-28 NOTE — TELEPHONE ENCOUNTER
Spoke with patient, states he started with scratchy throat day before yesterday and has worsened every since.  States now he has ST, congestion, cough, body aches, chills, fever and vomiting. States he has not home tested for Covid. Explained that he does need to be seen and evaluated but that we do not have any appointments for today.  States his wife will take him to [facility] this afternoon. Explained that would be best and to call if anything further.  Agreed.

## 2022-12-28 NOTE — TELEPHONE ENCOUNTER
Caller: Koffi Melo    Relationship to patient: Self    Best call back number [phone]    Patient is needing: PATIENT IS VERY ILL, REQUESTING IF THERE ARE ANY CANCELLATIONS TODAY 12/28 THAT HE BE CALLED IN TO GET SEEN.

## 2023-01-03 ENCOUNTER — OFFICE VISIT (OUTPATIENT)
Dept: ORTHOPEDIC SURGERY | Facility: CLINIC | Age: 63
End: 2023-01-03
Payer: COMMERCIAL

## 2023-01-03 VITALS — TEMPERATURE: 96.6 F

## 2023-01-03 DIAGNOSIS — S46.012D TRAUMATIC INCOMPLETE TEAR OF LEFT ROTATOR CUFF, SUBSEQUENT ENCOUNTER: ICD-10-CM

## 2023-01-03 DIAGNOSIS — Z98.890 S/P LEFT ROTATOR CUFF REPAIR: Primary | ICD-10-CM

## 2023-01-03 DIAGNOSIS — M75.22 BICEPS TENDINITIS OF LEFT UPPER EXTREMITY: ICD-10-CM

## 2023-01-03 PROCEDURE — 99024 POSTOP FOLLOW-UP VISIT: CPT | Performed by: ORTHOPAEDIC SURGERY

## 2023-01-03 PROCEDURE — 1159F MED LIST DOCD IN RCRD: CPT | Performed by: ORTHOPAEDIC SURGERY

## 2023-01-03 PROCEDURE — 1160F RVW MEDS BY RX/DR IN RCRD: CPT | Performed by: ORTHOPAEDIC SURGERY

## 2023-01-03 NOTE — PROGRESS NOTES
[facility] Office Follow Up       Office Follow Up Visit       Patient Name: Koffi Melo    Chief Complaint:   Chief Complaint   Patient presents with   • Post-op     2 month follow up; s/p Left RCR Repair w Biceps Tenodesis 10/17/22       Referring Physician: No ref. provider found    History of Present Illness:   It has been 2  month(s) since Koffi Melo's last visit. Koffi Melo returns to clinic today for F/U: follow-up of leftBody Part: shoulderReason: arthroscopy. The issue has been ongoing for 11 month(s). Koffi Melo rates HIS/HER: hispain at 5/10 on the pain scale. Previous/current treatments: physical therapy. Current symptoms:Symptoms: pain. The pain is worse with lying on affected side; ice improves the pain. Overall, he/she: heis doing better.  I have reviewed the patient's history of present illness as noted/entered above.    I have reviewed the patient's past medical history, surgical history, social history, family history, medications, and allergies as noted in the electronic medical record and as noted/entered.  I have reviewed the patient's review of systems as noted/enter and updated as noted in the patient's HPI.    Left shoulder  Date of surgery 10/17/2022  Preoperative Diagnosis:  1.     Rotator cuff tear - upper border subscapularis tear treated with arthroscopic rotator cuff repair - CPT 34419  2.     Biceps tendinopathy and biceps tearing intra-articular and extra-articular- treated with arthroscopic biceps tenodesis - CPT 61685  3.     Shoulder impingement syndrome - treated with arthroscopic subacromial decompression with acromioplasty - CPT 91913     Postoperative Diagnosis:  1.     SAME as preoperative diagnoses     Procedure:  1.     Arthroscopic rotator cuff repair (1 anchor upper border subscapularis tear) - CPT 84750  2.     Arthroscopic biceps  tenodesis (8-8)- CPT 61542  3.     Arthroscopic subacromial decompression with acromioplasty - CPT 75017        Admission status: elective outpatient surgery    1/3/2023:  Left shoulder rotator cuff repair and biceps tenodesis 10/17/2022-1 anchor upper border subscapularis repair    Doing better improved/better  Physical therapy at [facility]  Currently off work while recovering from surgery  Works for [employer]    Subjective   Subjective      Review of Systems   Constitutional: Negative.    HENT: Negative.    Eyes: Negative.    Respiratory: Negative.    Cardiovascular: Negative.    Gastrointestinal: Negative.    Endocrine: Negative.    Genitourinary: Negative.    Musculoskeletal: Positive for arthralgias.   Skin: Negative.    Allergic/Immunologic: Negative.    Neurological: Negative.    Hematological: Negative.    Psychiatric/Behavioral: Negative.         Past Medical History:   Past Medical History:   Diagnosis Date   • Anxiety    • CVA (cerebral vascular accident) (HCC) 01/31/2013   • Depression     \   • Hyperlipidemia    • PFO (patent foramen ovale)    • Sleep apnea        Past Surgical History:   Past Surgical History:   Procedure Laterality Date   • ELBOW FUSION     • KNEE SURGERY Bilateral    • PATENT FORAMEN OVALE CLOSURE  2013   • SHOULDER SURGERY Left 10/17/2022    Left Rotator Cuff Repair w Biceps Tenodesis[facility]       Family History:   Family History   Problem Relation Age of Onset   • COPD Mother    • Cancer Father    • Alcohol abuse Father    • Cancer Maternal Grandmother    • Stroke Maternal Grandmother    • Hypertension Maternal Grandfather        Social History:   Social History     Socioeconomic History   • Marital status:    Tobacco Use   • Smoking status: Never   • Smokeless tobacco: Never   Substance and Sexual Activity   • Alcohol use: No   • Drug use: No   • Sexual activity: Yes     Partners: Female     Comment:          Medications:   Current Outpatient Medications:   •  aspirin 81 MG EC tablet, Take 81 mg by mouth Daily., Disp: , Rfl:   •  buPROPion XL (WELLBUTRIN XL) 300 MG 24 hr tablet, TAKE 1 TABLET DAILY, Disp: 90 tablet, Rfl: 1  •  cyclobenzaprine (FLEXERIL) 10 MG tablet, Take 1 tablet by mouth At Night As Needed for Muscle Spasms., Disp: 30 tablet, Rfl: 1  •  fluorometholone (FML) 0.1 % ophthalmic suspension, , Disp: , Rfl:   •  FLUoxetine (PROzac) 40 MG capsule, Take 1 capsule by mouth Daily., Disp: 90 capsule, Rfl: 1  •  HYDROcodone-acetaminophen (NORCO) 5-325 MG per tablet, Take 1 tablet by mouth Every 6 (Six) Hours As Needed for Moderate Pain., Disp: 30 tablet, Rfl: 0  •  ibuprofen (ADVIL,MOTRIN) 200 MG tablet, Take 1 tablet by mouth Every 6 (Six) Hours As Needed for Mild Pain., Disp: , Rfl:   •  meloxicam (MOBIC) 15 MG tablet, Take 1 tablet by mouth Daily As Needed for Moderate Pain., Disp: 30 tablet, Rfl: 2    Allergies:   Allergies   Allergen Reactions   • Hydrocodone-Acetaminophen Itching       The following portions of the patient's history were reviewed and updated as appropriate: allergies, current medications, past family history, past medical history, past social history, past surgical history and problem list.        Objective    Objective      Vital Signs:   Vitals:    01/03/23 1327   Temp: 96.6 °F (35.9 °C)   TempSrc: Temporal       Ortho Exam:  LEFT SHOULDER  Well appearing  Smooth arc of motion  Good Ssc strength     Results Review:  Imaging Results (Last 24 Hours)     ** No results found for the last 24 hours. **          MRI Shoulder Left Without Contrast    Addendum Date: 9/13/2022    Upon further review there is increased signal within the superior labrum extending from anterior to posterior which is suspicious for a nondisplaced SLAP tear.  This report was finalized on 9/13/2022 10:14 AM by Сергей Patel.      Result Date: 9/13/2022  Rotator cuff tendinopathy without full-thickness tear.   Small glenohumeral joint effusion.  Mild biceps tenosynovitis.  Moderate degenerative changes of the acromioclavicular joint.  This report was finalized on 9/13/2022 9:53 AM by Сергей Patel.      XR Knee Left 4+ Views    Result Date: 9/20/2022  Persistent severe medial compartment osteoarthritis with increased subchondral sclerosis. CRITICAL RESULT:   No. COMMUNICATION: Per this written report. Dictated by Mundo Sevilla MD on 9/20/2022 3:15 PM Signed by Mundo Sevilla MD on 9/20/2022 3:18 PM          Procedures            Assessment / Plan      Assessment/Plan:   Problem List Items Addressed This Visit        Musculoskeletal and Injuries    Biceps tendinitis of left upper extremity    Relevant Orders    Ambulatory Referral to Physical Therapy Evaluate and treat, Ortho, POST OP (Completed)    Traumatic incomplete tear of left rotator cuff    Relevant Orders    Ambulatory Referral to Physical Therapy Evaluate and treat, Ortho, POST OP (Completed)   Other Visit Diagnoses     S/P left rotator cuff repair    -  Primary    Relevant Orders    Ambulatory Referral to Physical Therapy Evaluate and treat, Ortho, POST OP (Completed)        Left shoulder doing well postoperatively.  He will continue with physical therapy new PT Rx provided and okay for gradual strengthening at this point.  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 [address]    01/03/23  14:13 EST

## 2023-01-23 ENCOUNTER — TELEPHONE (OUTPATIENT)
Dept: INTERNAL MEDICINE | Facility: CLINIC | Age: 63
End: 2023-01-23
Payer: COMMERCIAL

## 2023-01-23 DIAGNOSIS — H91.93 BILATERAL HEARING LOSS, UNSPECIFIED HEARING LOSS TYPE: Primary | ICD-10-CM

## 2023-01-23 NOTE — TELEPHONE ENCOUNTER
Tried to contact patient, was unable to leave voicemail  HUB okay to read message from Dr. Salazar: I have put in an order for an audiology evaluation
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
## 2023-02-08 DIAGNOSIS — F41.9 ANXIETY: ICD-10-CM

## 2023-02-08 RX ORDER — FLUOXETINE HYDROCHLORIDE 40 MG/1
CAPSULE ORAL
Qty: 90 CAPSULE | Refills: 3 | Status: SHIPPED | OUTPATIENT
Start: 2023-02-08

## 2023-03-01 RX ORDER — BUPROPION HYDROCHLORIDE 300 MG/1
TABLET ORAL
Qty: 90 TABLET | Refills: 1 | Status: SHIPPED | OUTPATIENT
Start: 2023-03-01

## 2023-03-03 ENCOUNTER — OFFICE VISIT (OUTPATIENT)
Dept: ORTHOPEDIC SURGERY | Facility: CLINIC | Age: 63
End: 2023-03-03
Payer: COMMERCIAL

## 2023-03-03 VITALS
SYSTOLIC BLOOD PRESSURE: 128 MMHG | WEIGHT: 199.2 LBS | DIASTOLIC BLOOD PRESSURE: 82 MMHG | BODY MASS INDEX: 31.27 KG/M2 | HEIGHT: 67 IN

## 2023-03-03 DIAGNOSIS — Z98.890 S/P LEFT ROTATOR CUFF REPAIR: Primary | ICD-10-CM

## 2023-03-03 DIAGNOSIS — S43.432A SUPERIOR GLENOID LABRUM LESION OF LEFT SHOULDER, INITIAL ENCOUNTER: ICD-10-CM

## 2023-03-03 DIAGNOSIS — S43.003A SUBLUXATION OF TENDON OF LONG HEAD OF BICEPS: ICD-10-CM

## 2023-03-03 DIAGNOSIS — S46.012A TRAUMATIC INCOMPLETE TEAR OF LEFT ROTATOR CUFF, INITIAL ENCOUNTER: ICD-10-CM

## 2023-03-03 DIAGNOSIS — S46.012D TRAUMATIC INCOMPLETE TEAR OF LEFT ROTATOR CUFF, SUBSEQUENT ENCOUNTER: ICD-10-CM

## 2023-03-03 DIAGNOSIS — M75.52 BURSITIS OF LEFT SHOULDER: ICD-10-CM

## 2023-03-03 DIAGNOSIS — M75.42 IMPINGEMENT SYNDROME OF LEFT SHOULDER: ICD-10-CM

## 2023-03-03 DIAGNOSIS — M75.22 BICEPS TENDINITIS OF LEFT UPPER EXTREMITY: ICD-10-CM

## 2023-03-03 PROCEDURE — 99213 OFFICE O/P EST LOW 20 MIN: CPT | Performed by: ORTHOPAEDIC SURGERY

## 2023-03-03 RX ORDER — OMEGA-3 FATTY ACIDS/FISH OIL 300-1000MG
CAPSULE ORAL
COMMUNITY
End: 2023-03-28

## 2023-03-03 RX ORDER — OXYCODONE HYDROCHLORIDE AND ACETAMINOPHEN 5; 325 MG/1; MG/1
1 TABLET ORAL EVERY 6 HOURS PRN
COMMUNITY
Start: 2023-02-14 | End: 2023-03-28

## 2023-03-03 RX ORDER — PREDNISOLONE ACETATE 10 MG/ML
SUSPENSION/ DROPS OPHTHALMIC
COMMUNITY
Start: 2023-02-06

## 2023-03-03 NOTE — PROGRESS NOTES
[facility] [place] [facility] [place]       Office Follow Up Visit       Patient Name: Koffi Melo    Chief Complaint:   Chief Complaint   Patient presents with   • Follow-up     2 month follow up; 4 months s/p Left RCR Repair w Biceps Tenodesis 10/17/22       Referring Physician: No ref. provider found    History of Present Illness:   It has been 2  month(s) since Koffi Melo's last visit. Koffi Melo returns to clinic today for F/U: postoperative follow-up of leftBody Part: shoulderReason: RCR. The issue has been ongoing for 1 year(s). Koffi Melo rates HIS/HER: hispain at 1/10 on the pain scale. Previous/current treatments: NSAIDS and physical therapy. Current symptoms:Symptoms: same as prior visit. The pain is worse with working and lying on affected side; medication improves the pain. Overall, he/she: heis doing better. I have reviewed the patient's history of present illness as noted/entered above.    I have reviewed the patient's past medical history, surgical history, social history, family history, medications, and allergies as noted in the electronic medical record and as noted/entered.  I have reviewed the patient's review of systems as noted/enter and updated as noted in the patient's HPI.      Left shoulder  Date of surgery 10/17/2022  Preoperative Diagnosis:  1.     Rotator cuff tear - upper border subscapularis tear treated with arthroscopic rotator cuff repair - CPT 30543  2.     Biceps tendinopathy and biceps tearing intra-articular and extra-articular- treated with arthroscopic biceps tenodesis - CPT 52224  3.     Shoulder impingement syndrome - treated with arthroscopic subacromial decompression with acromioplasty - CPT 14445     Postoperative Diagnosis:  1.     SAME as preoperative diagnoses     Procedure:  1.     Arthroscopic rotator cuff repair (1 anchor upper border  subscapularis tear) - CPT 93480  2.     Arthroscopic biceps tenodesis (8-8)- CPT 71523  3.     Arthroscopic subacromial decompression with acromioplasty - CPT 69240        Admission status: elective outpatient surgery     1/3/2023:  Left shoulder rotator cuff repair and biceps tenodesis 10/17/2022-1 anchor upper border subscapularis repair     Doing better improved/better  Physical therapy at [facility]  Currently off work while recovering from surgery  Works for [employer]    3/3/2023:  LEFT SHOULDER  Left shoulder rotator cuff repair and biceps tenodesis 10/17/2022-1 anchor upper border subscapularis repair    PT [facility]    Improved/better  Lost their dog after 16 years  4.5 months    PT has 5 more appointments    He is doing very well    Work -- requires 50lbs lifting, so going to work with PT in advance        Subjective   Subjective      Review of Systems   Musculoskeletal: Positive for arthralgias.   All other systems reviewed and are negative.       Past Medical History:   Past Medical History:   Diagnosis Date   • Anxiety    • CVA (cerebral vascular accident) (HCC) 01/31/2013   • Depression     \   • Hyperlipidemia    • PFO (patent foramen ovale)    • Sleep apnea        Past Surgical History:   Past Surgical History:   Procedure Laterality Date   • ELBOW FUSION     • KNEE SURGERY Bilateral    • PATENT FORAMEN OVALE CLOSURE  2013   • SHOULDER SURGERY Left 10/17/2022    Left Rotator Cuff Repair w Biceps Tenodesis, Dr Duke Benson, [facility], [facility]       Family History:   Family History   Problem Relation Age of Onset   • COPD Mother    • Cancer Father    • Alcohol abuse Father    • Cancer Maternal Grandmother    • Stroke Maternal Grandmother    • Hypertension Maternal Grandfather        Social History:   Social History     Socioeconomic History   • Marital status:    Tobacco Use   • Smoking status: Never   • Smokeless tobacco: Never   Substance and Sexual Activity   • Alcohol  "use: No   • Drug use: No   • Sexual activity: Yes     Partners: Female     Comment:         Medications:   Current Outpatient Medications:   •  aspirin 81 MG EC tablet, Take 1 tablet by mouth Daily., Disp: , Rfl:   •  buPROPion XL (WELLBUTRIN XL) 300 MG 24 hr tablet, TAKE 1 TABLET DAILY, Disp: 90 tablet, Rfl: 1  •  cyclobenzaprine (FLEXERIL) 10 MG tablet, Take 1 tablet by mouth At Night As Needed for Muscle Spasms., Disp: 30 tablet, Rfl: 1  •  fluorometholone (FML) 0.1 % ophthalmic suspension, , Disp: , Rfl:   •  FLUoxetine (PROzac) 40 MG capsule, TAKE 1 CAPSULE DAILY, Disp: 90 capsule, Rfl: 3  •  HYDROcodone-acetaminophen (NORCO) 5-325 MG per tablet, Take 1 tablet by mouth Every 6 (Six) Hours As Needed for Moderate Pain., Disp: 30 tablet, Rfl: 0  •  ibuprofen (ADVIL,MOTRIN) 200 MG tablet, Take 1 tablet by mouth Every 6 (Six) Hours As Needed for Mild Pain., Disp: , Rfl:   •  Ibuprofen 200 MG capsule, Advil, Disp: , Rfl:   •  meloxicam (MOBIC) 15 MG tablet, Take 1 tablet by mouth Daily As Needed for Moderate Pain., Disp: 30 tablet, Rfl: 2  •  oxyCODONE-acetaminophen (PERCOCET) 5-325 MG per tablet, Take 1 tablet by mouth Every 6 (Six) Hours As Needed. for pain, Disp: , Rfl:   •  prednisoLONE acetate (PRED FORTE) 1 % ophthalmic suspension, INSTILL 1 DROP IN LEFT EYE THREE TIMES DAILY, Disp: , Rfl:     Allergies:   Allergies   Allergen Reactions   • Hydrocodone-Acetaminophen Itching       The following portions of the patient's history were reviewed and updated as appropriate: allergies, current medications, past family history, past medical history, past social history, past surgical history and problem list.        Objective    Objective      Vital Signs:   Vitals:    03/03/23 1058   BP: 128/82   Weight: 90.4 kg (199 lb 3.2 oz)   Height: 170.2 cm (67.01\")       Ortho Exam:  LEFT SHOULDER  Excellent 5/5 strength  Stoic  Excellent arc of motion    Results Review:  Imaging Results (Last 24 Hours)     ** No results " found for the last 24 hours. **            Procedures            Assessment / Plan      Assessment/Plan:   Problem List Items Addressed This Visit        Musculoskeletal and Injuries    Biceps tendinitis of left upper extremity    Relevant Orders    Ambulatory Referral to Physical Therapy Evaluate and treat, Ortho, POST OP (Completed)    Impingement syndrome of left shoulder    Relevant Orders    Ambulatory Referral to Physical Therapy Evaluate and treat, Ortho, POST OP (Completed)    Bursitis of left shoulder    Relevant Orders    Ambulatory Referral to Physical Therapy Evaluate and treat, Ortho, POST OP (Completed)    Traumatic incomplete tear of left rotator cuff    Relevant Orders    Ambulatory Referral to Physical Therapy Evaluate and treat, Ortho, POST OP (Completed)    Ambulatory Referral to Physical Therapy Evaluate and treat, Ortho, POST OP (Completed)    Subluxation of tendon of long head of biceps    Relevant Orders    Ambulatory Referral to Physical Therapy Evaluate and treat, Ortho, POST OP (Completed)    Superior glenoid labrum lesion of left shoulder    Relevant Orders    Ambulatory Referral to Physical Therapy Evaluate and treat, Ortho, POST OP (Completed)   Other Visit Diagnoses     S/P left rotator cuff repair    -  Primary    Relevant Orders    Ambulatory Referral to Physical Therapy Evaluate and treat, Ortho, POST OP (Completed)          LEFT SHOULDER    PT Rx -- okay to gradually progress to WB as tolerated    WORK: We will target in April 3, 2023 full duty return.  I would like to see him back in advance of the full duty return to ensure that he is able to proceed with his full duty responsibilities.  He notes his work may be able to offer a light duty but that is unclear at this time.  Would like to get him back to be out of return to full duty and I think that timing will be great.  We will see how he does with therapy      Follow Up: I would love to see him back prior to his full duty return  on April 3, 2023 to ensure that he is ready      Duke Benson MD, FAAOS  Orthopedic Surgeon  Fellowship Trained Shoulder and Elbow Surgeon  [facility]  Orthopedics and Sports Medicine  [address] [place] [address], [address]  [place] [address]    03/03/23  11:37 EST

## 2023-03-28 ENCOUNTER — OFFICE VISIT (OUTPATIENT)
Dept: INTERNAL MEDICINE | Facility: CLINIC | Age: 63
End: 2023-03-28
Payer: COMMERCIAL

## 2023-03-28 VITALS
SYSTOLIC BLOOD PRESSURE: 124 MMHG | WEIGHT: 203 LBS | BODY MASS INDEX: 31.79 KG/M2 | DIASTOLIC BLOOD PRESSURE: 84 MMHG | HEART RATE: 76 BPM | TEMPERATURE: 98.4 F | RESPIRATION RATE: 18 BRPM

## 2023-03-28 DIAGNOSIS — F41.9 ANXIETY: Primary | ICD-10-CM

## 2023-03-28 PROCEDURE — 99213 OFFICE O/P EST LOW 20 MIN: CPT | Performed by: INTERNAL MEDICINE

## 2023-03-28 RX ORDER — ARIPIPRAZOLE 2 MG/1
2 TABLET ORAL DAILY
Qty: 30 TABLET | Refills: 2 | Status: SHIPPED | OUTPATIENT
Start: 2023-03-28

## 2023-03-28 NOTE — PROGRESS NOTES
Chief Complaint   Patient presents with   • Follow-up     Follow up stress        History of Present Illness    The patient presents for a follow-up related to anxiety. He reports currently having anxiety symptoms. He is not having panic attacks. His energy level is good. He denies agorophobia. He sleeps well. He is currently taking a medication. He states that his current anxiety symptoms are worsened. The current medication regimen consists of fluoxetine and Wellbutrin XL. The patient denies having medication side effects including nausea, headaches, anxiety, increased depression, anorgasmia or fatigue.    Review of Systems    PSYCHIATRIC- Reports: Anxiety. Denies: Depression, Loneliness, Tearfulness, Hopelessness or Suicidal Ideation.    Medications      Current Outpatient Medications:   •  buPROPion XL (WELLBUTRIN XL) 300 MG 24 hr tablet, TAKE 1 TABLET DAILY, Disp: 90 tablet, Rfl: 1  •  fluorometholone (FML) 0.1 % ophthalmic suspension, , Disp: , Rfl:   •  FLUoxetine (PROzac) 40 MG capsule, TAKE 1 CAPSULE DAILY, Disp: 90 capsule, Rfl: 3  •  HYDROcodone-acetaminophen (NORCO) 5-325 MG per tablet, Take 1 tablet by mouth Every 6 (Six) Hours As Needed for Moderate Pain., Disp: 30 tablet, Rfl: 0  •  ibuprofen (ADVIL,MOTRIN) 200 MG tablet, Take 1 tablet by mouth Every 6 (Six) Hours As Needed for Mild Pain., Disp: , Rfl:   •  meloxicam (MOBIC) 15 MG tablet, Take 1 tablet by mouth Daily As Needed for Moderate Pain., Disp: 30 tablet, Rfl: 2  •  prednisoLONE acetate (PRED FORTE) 1 % ophthalmic suspension, INSTILL 1 DROP IN LEFT EYE THREE TIMES DAILY, Disp: , Rfl:   •  ARIPiprazole (Abilify) 2 MG tablet, Take 1 tablet by mouth Daily., Disp: 30 tablet, Rfl: 2     Allergies    Allergies   Allergen Reactions   • Hydrocodone-Acetaminophen Itching   • Oxycodone-Acetaminophen Itching       Problem List    Patient Active Problem List   Diagnosis   • Anxiety   • PFO (patent foramen ovale)   • History of CVA (cerebrovascular  accident)   • Obesity   • Concentration deficit   • Microangiopathy (HCC)   • Pre-operative clearance   • Rotator cuff injury, left, initial encounter   • Biceps tendinitis of left upper extremity   • Impingement syndrome of left shoulder   • Bursitis of left shoulder   • Traumatic incomplete tear of left rotator cuff   • Subluxation of tendon of long head of biceps   • Superior glenoid labrum lesion of left shoulder       Medications, Allergies, Problems List and Past History were reviewed and updated.    Physical Examination    /84 (BP Location: Left arm, Patient Position: Sitting, Cuff Size: Adult)   Pulse 76   Temp 98.4 °F (36.9 °C) (Infrared)   Resp 18   Wt 92.1 kg (203 lb)   BMI 31.79 kg/m²     Psychiatric Examination: The patient appears appropriate, clean and tidy with a level of consciousness that is appropriate and alert. The facial expression is appropriate and he makes good eye contact. The patient is talkative and the speech volume is appropriate. The words are articulated clearly with a normal flow. There are no circumlocutions and the patient does not paraphrase.       The mood is appropriate. There are no abnormal thought processes and the thought content is normal. There are no delusions or hallucinations noted.    Impression and Assessment    Anxiety Disorder Unspecified.    Plan    Anxiety Disorder Unspecified Plan: The patient was instructed to continue the current medications. Medication will be added as noted.    Diagnoses and all orders for this visit:    1. Anxiety (Primary)  -     ARIPiprazole (Abilify) 2 MG tablet; Take 1 tablet by mouth Daily.  Dispense: 30 tablet; Refill: 2        Return to Office    The patient was instructed to return for follow-up in 1 month. The patient was instructed to return sooner if the condition changes, worsens, or does not resolve.

## 2023-03-31 ENCOUNTER — OFFICE VISIT (OUTPATIENT)
Dept: ORTHOPEDIC SURGERY | Facility: CLINIC | Age: 63
End: 2023-03-31
Payer: COMMERCIAL

## 2023-03-31 VITALS
BODY MASS INDEX: 31.87 KG/M2 | SYSTOLIC BLOOD PRESSURE: 128 MMHG | DIASTOLIC BLOOD PRESSURE: 82 MMHG | HEIGHT: 67 IN | WEIGHT: 203.04 LBS

## 2023-03-31 DIAGNOSIS — M75.22 BICEPS TENDINITIS OF LEFT UPPER EXTREMITY: ICD-10-CM

## 2023-03-31 DIAGNOSIS — M75.42 IMPINGEMENT SYNDROME OF LEFT SHOULDER: ICD-10-CM

## 2023-03-31 DIAGNOSIS — S46.012A TRAUMATIC INCOMPLETE TEAR OF LEFT ROTATOR CUFF, INITIAL ENCOUNTER: ICD-10-CM

## 2023-03-31 DIAGNOSIS — S46.012D TRAUMATIC INCOMPLETE TEAR OF LEFT ROTATOR CUFF, SUBSEQUENT ENCOUNTER: ICD-10-CM

## 2023-03-31 DIAGNOSIS — M75.52 BURSITIS OF LEFT SHOULDER: ICD-10-CM

## 2023-03-31 DIAGNOSIS — Z98.890 S/P LEFT ROTATOR CUFF REPAIR: Primary | ICD-10-CM

## 2023-03-31 DIAGNOSIS — S43.003A SUBLUXATION OF TENDON OF LONG HEAD OF BICEPS: ICD-10-CM

## 2023-03-31 DIAGNOSIS — S43.432A SUPERIOR GLENOID LABRUM LESION OF LEFT SHOULDER, INITIAL ENCOUNTER: ICD-10-CM

## 2023-03-31 RX ORDER — ASPIRIN 81 MG/1
81 TABLET, CHEWABLE ORAL DAILY
COMMUNITY

## 2023-03-31 NOTE — PROGRESS NOTES
[facility] [place] [facility] Follow Up       Office Follow Up Visit       Patient Name: Koffi Melo    Chief Complaint:   Chief Complaint   Patient presents with   • Follow-up     4 week f/u -5 months s/p Left RCR Repair w Biceps Tenodesis 10/17/22       Referring Physician: No ref. provider found    History of Present Illness:   It has been 1  month(s) since Koffi Melo's last visit. Koffi Melo returns to clinic today for F/U: follow-up of leftBody Part: shoulderReason: arthroscopy. The issue has been ongoing for 5 month(s). Koffi Melo rates HIS/HER: hispain at 0/10 on the pain scale. Previous/current treatments: NSAIDS and physical therapy. Current symptoms:Symptoms: pain. The pain is worse with sleeping, working and lying on affected side; resting and pain medication and/or NSAID improves the pain. Overall, he/she: heis doing better. I have reviewed the patient's history of present illness as noted/entered above.    I have reviewed the patient's past medical history, surgical history, social history, family history, medications, and allergies as noted in the electronic medical record and as noted/entered.  I have reviewed the patient's review of systems as noted/enter and updated as noted in the patient's HPI.      Left shoulder  Date of surgery 10/17/2022  Preoperative Diagnosis:  1.     Rotator cuff tear - upper border subscapularis tear treated with arthroscopic rotator cuff repair - CPT 99948  2.     Biceps tendinopathy and biceps tearing intra-articular and extra-articular- treated with arthroscopic biceps tenodesis - CPT 27650  3.     Shoulder impingement syndrome - treated with arthroscopic subacromial decompression with acromioplasty - CPT 69984     Postoperative Diagnosis:  1.     SAME as preoperative diagnoses     Procedure:  1.     Arthroscopic rotator cuff repair (1 anchor upper  border subscapularis tear) - CPT 50859  2.     Arthroscopic biceps tenodesis (8-8)- CPT 23136  3.     Arthroscopic subacromial decompression with acromioplasty - CPT 09147        Admission status: elective outpatient surgery     1/3/2023:  Left shoulder rotator cuff repair and biceps tenodesis 10/17/2022-1 anchor upper border subscapularis repair     Doing better improved/better  Physical therapy at [facility]  Currently off work while recovering from surgery  Works for [employer]     3/3/2023:  LEFT SHOULDER  Left shoulder rotator cuff repair and biceps tenodesis 10/17/2022-1 anchor upper border subscapularis repair     PT [facility]     Improved/better  Lost their dog after 16 years  4.5 months     PT has 5 more appointments     He is doing very well    Work -- requires 50lbs lifting, so going to work with PT in advance       3/31/2023:  Excellent recovery and ready for full duty return April 3, 2023  He has 1 final PT visit left    We reviewed his intraoperative images showing notable intra-articular and extra-articular long head of the biceps tearing near complete/full-thickness tearing      Subjective   Subjective      Review of Systems   Constitutional: Negative.  Negative for chills, fatigue and fever.   HENT: Negative.  Negative for congestion and dental problem.    Eyes: Negative.  Negative for blurred vision.   Respiratory: Negative.  Negative for shortness of breath.    Cardiovascular: Negative.  Negative for leg swelling.   Gastrointestinal: Negative.  Negative for abdominal pain.   Endocrine: Negative.  Negative for polyuria.   Genitourinary: Negative.  Negative for difficulty urinating.   Musculoskeletal: Positive for arthralgias.   Skin: Negative.    Allergic/Immunologic: Negative.    Neurological: Negative.    Hematological: Negative.  Negative for adenopathy.   Psychiatric/Behavioral: Negative.  Negative for behavioral problems.        Past Medical History:   Past Medical History:   Diagnosis Date    • Anxiety    • CVA (cerebral vascular accident) ([facility]) 01/31/2013   • Depression     \   • Hyperlipidemia    • PFO (patent foramen ovale)    • Sleep apnea        Past Surgical History:   Past Surgical History:   Procedure Laterality Date   • ELBOW FUSION     • KNEE SURGERY Bilateral    • PATENT FORAMEN OVALE CLOSURE  2013   • SHOULDER SURGERY Left 10/17/2022    Left Rotator Cuff Repair w Biceps Tenodesis, Dr Duke Benson, [facility], [facility]       Family History:   Family History   Problem Relation Age of Onset   • COPD Mother    • Cancer Father    • Alcohol abuse Father    • Cancer Maternal Grandmother    • Stroke Maternal Grandmother    • Hypertension Maternal Grandfather        Social History:   Social History     Socioeconomic History   • Marital status:    Tobacco Use   • Smoking status: Never   • Smokeless tobacco: Never   Substance and Sexual Activity   • Alcohol use: No   • Drug use: No   • Sexual activity: Yes     Partners: Female     Comment:         Medications:   Current Outpatient Medications:   •  ARIPiprazole (Abilify) 2 MG tablet, Take 1 tablet by mouth Daily., Disp: 30 tablet, Rfl: 2  •  aspirin 81 MG chewable tablet, Chew 1 tablet Daily., Disp: , Rfl:   •  buPROPion XL (WELLBUTRIN XL) 300 MG 24 hr tablet, TAKE 1 TABLET DAILY, Disp: 90 tablet, Rfl: 1  •  fluorometholone (FML) 0.1 % ophthalmic suspension, , Disp: , Rfl:   •  FLUoxetine (PROzac) 40 MG capsule, TAKE 1 CAPSULE DAILY, Disp: 90 capsule, Rfl: 3  •  ibuprofen (ADVIL,MOTRIN) 200 MG tablet, Take 1 tablet by mouth Every 6 (Six) Hours As Needed for Mild Pain., Disp: , Rfl:   •  prednisoLONE acetate (PRED FORTE) 1 % ophthalmic suspension, INSTILL 1 DROP IN LEFT EYE THREE TIMES DAILY, Disp: , Rfl:   •  meloxicam (MOBIC) 15 MG tablet, Take 1 tablet by mouth Daily As Needed for Moderate Pain. (Patient not taking: Reported on 3/31/2023), Disp: 30 tablet, Rfl: 2    Allergies:   Allergies   Allergen Reactions   •  "Hydrocodone-Acetaminophen Itching   • Oxycodone-Acetaminophen Itching       The following portions of the patient's history were reviewed and updated as appropriate: allergies, current medications, past family history, past medical history, past social history, past surgical history and problem list.        Objective    Objective      Vital Signs:   Vitals:    03/31/23 1031   BP: 128/82   Weight: 92.1 kg (203 lb 0.7 oz)   Height: 170.2 cm (67.01\")       Ortho Exam:  Left shoulder excellent recovery healed incisions excellent subscapularis and bicep strength.  Smooth arc of motion    Results Review:  Imaging Results (Last 24 Hours)     ** No results found for the last 24 hours. **                Procedures            Assessment / Plan      Assessment/Plan:   Problem List Items Addressed This Visit        Musculoskeletal and Injuries    Biceps tendinitis of left upper extremity    Impingement syndrome of left shoulder    Bursitis of left shoulder    Traumatic incomplete tear of left rotator cuff    Subluxation of tendon of long head of biceps    Superior glenoid labrum lesion of left shoulder    S/P left rotator cuff repair - Primary       Left shoulder excellent recovery okay for full duty release 4/3/2023    Follow Up: As needed      Duke Benson MD, FAAOS  Orthopedic Surgeon  Fellowship Trained Shoulder and Elbow Surgeon  [facility]  Orthopedics and Sports Medicine  [address] [place] [address], [address]  [place] [address]    03/31/23  12:09 EDT  "

## 2023-04-16 DIAGNOSIS — R07.2 RETROSTERNAL CHEST PAIN: ICD-10-CM

## 2023-04-16 DIAGNOSIS — M54.2 NECK PAIN: ICD-10-CM

## 2023-04-16 DIAGNOSIS — M54.50 ACUTE LOW BACK PAIN WITHOUT SCIATICA, UNSPECIFIED BACK PAIN LATERALITY: ICD-10-CM

## 2023-04-17 RX ORDER — MELOXICAM 15 MG/1
TABLET ORAL
Qty: 30 TABLET | Refills: 2 | OUTPATIENT
Start: 2023-04-17

## 2023-04-26 RX ORDER — BUPROPION HYDROCHLORIDE 300 MG/1
300 TABLET ORAL DAILY
Qty: 90 TABLET | Refills: 1 | Status: SHIPPED | OUTPATIENT
Start: 2023-04-26

## 2023-04-26 NOTE — TELEPHONE ENCOUNTER
Patient is requesting Rx refill for anxiety, depression medication to send to [facility] pharmacy (on file) please.

## 2023-06-08 ENCOUNTER — TELEPHONE (OUTPATIENT)
Dept: INTERNAL MEDICINE | Facility: CLINIC | Age: 63
End: 2023-06-08
Payer: COMMERCIAL

## 2023-06-08 NOTE — TELEPHONE ENCOUNTER
"    Caller: Koffi Melo \"Yovani\"    Relationship: Self    Best call back number: [phone]     What form or medical record are you requesting: OFFICE NOTES FROM 3/28/23; PLEASE PROVIDE TOW COPIES    Who is requesting this form or medical record from you: PERSONAL USE AND HIS      How would you like to receive the form or medical records (pick-up, mail, fax): PICK-UP  If fax, what is the fax number:   If mail, what is the address:   If pick-up, provide patient with address and location details    Timeframe paperwork needed: 6/9/23    Additional notes: PATIENT STATES HIMSELF OR HIS DAUGHTER, SHON VALADEZ, WILL PICK-UP.         "

## 2023-08-28 RX ORDER — BUPROPION HYDROCHLORIDE 300 MG/1
TABLET ORAL
Qty: 90 TABLET | Refills: 3 | Status: SHIPPED | OUTPATIENT
Start: 2023-08-28

## 2023-12-08 DIAGNOSIS — F41.9 ANXIETY: ICD-10-CM

## 2023-12-08 NOTE — TELEPHONE ENCOUNTER
LOV 03/28/2023  NOV   Left message on machine for patient to call    Relay the following information:    Patient is due for a Follow up.  Please advise patient he needs to schedule and keep his appointment to continue to receive refills in the future.  If he is unable to keep his appointment we will not be able to provider further refills.  Once appointment has been scheduled please update message with date and time so we can process the request.  We will forward the message to the provider to review the refill request.

## 2023-12-12 RX ORDER — ARIPIPRAZOLE 2 MG/1
2 TABLET ORAL DAILY
Qty: 30 TABLET | Refills: 2 | Status: SHIPPED | OUTPATIENT
Start: 2023-12-12

## 2024-01-02 ENCOUNTER — OFFICE VISIT (OUTPATIENT)
Dept: INTERNAL MEDICINE | Facility: CLINIC | Age: 64
End: 2024-01-02
Payer: COMMERCIAL

## 2024-01-02 VITALS
HEART RATE: 84 BPM | RESPIRATION RATE: 18 BRPM | WEIGHT: 207 LBS | DIASTOLIC BLOOD PRESSURE: 82 MMHG | SYSTOLIC BLOOD PRESSURE: 132 MMHG | BODY MASS INDEX: 32.41 KG/M2 | TEMPERATURE: 97.7 F

## 2024-01-02 DIAGNOSIS — R05.1 ACUTE COUGH: ICD-10-CM

## 2024-01-02 DIAGNOSIS — J06.9 UPPER RESPIRATORY TRACT INFECTION, UNSPECIFIED TYPE: Primary | ICD-10-CM

## 2024-01-02 LAB
EXPIRATION DATE: NORMAL
FLUAV AG UPPER RESP QL IA.RAPID: NOT DETECTED
FLUBV AG UPPER RESP QL IA.RAPID: NOT DETECTED
INTERNAL CONTROL: NORMAL
Lab: NORMAL
SARS-COV-2 AG UPPER RESP QL IA.RAPID: NOT DETECTED

## 2024-01-02 PROCEDURE — 87428 SARSCOV & INF VIR A&B AG IA: CPT | Performed by: INTERNAL MEDICINE

## 2024-01-02 PROCEDURE — 99214 OFFICE O/P EST MOD 30 MIN: CPT | Performed by: INTERNAL MEDICINE

## 2024-01-02 RX ORDER — BROMPHENIRAMINE MALEATE, PSEUDOEPHEDRINE HYDROCHLORIDE, AND DEXTROMETHORPHAN HYDROBROMIDE 2; 30; 10 MG/5ML; MG/5ML; MG/5ML
5 SYRUP ORAL EVERY 6 HOURS PRN
Qty: 473 ML | Refills: 0 | Status: SHIPPED | OUTPATIENT
Start: 2024-01-02

## 2024-01-02 NOTE — PATIENT INSTRUCTIONS
Continue current over the counter medication, and plenty of fluids.    I recommend notifying your surgeon that you have an upper respiratory infection and were negative for COVID 19 and Influenza.  Also double check to make sure you are okay to continue taking Meloxicam prior to the surgery, as we discussed.

## 2024-01-02 NOTE — PROGRESS NOTES
Subjective       Koffi Melo is a 63 y.o. male.     Chief Complaint   Patient presents with    Nasal Congestion     X2 days     Cough     X3 days        History obtained from the patient.      URI   This is a new problem. Episode onset: 3 days ago. The problem has been gradually improving. There has been no fever. Associated symptoms include congestion, coughing (dry and wet, occasionally productive of light brown sputum), rhinorrhea (clear), sneezing and a sore throat (mild yesaterday). Pertinent negatives include no abdominal pain, chest pain, diarrhea, ear pain, headaches, joint pain, joint swelling, nausea, neck pain, plugged ear sensation, rash, sinus pain, swollen glands, vomiting or wheezing. Treatments tried: Mucinex and Tylenol. The treatment provided moderate relief.      There is no known exposure to influenza, COVID-19, RSV, or strep.  Of note, the patient has a knee replacement surgery scheduled for tomorrow.    The following portions of the patient's history were reviewed and updated as appropriate: allergies, current medications, past family history, past medical history, past social history, past surgical history, and problem list.      Review of Systems   Constitutional:  Positive for fatigue (mild). Negative for appetite change, chills and fever.   HENT:  Positive for congestion, postnasal drip, rhinorrhea (clear), sneezing and sore throat (mild yesaterday). Negative for ear pain, sinus pressure and sinus pain.    Respiratory:  Positive for cough (dry and wet, occasionally productive of light brown sputum). Negative for chest tightness, shortness of breath and wheezing.    Cardiovascular:  Negative for chest pain.   Gastrointestinal:  Negative for abdominal pain, diarrhea, nausea and vomiting.   Musculoskeletal:  Negative for arthralgias, joint pain, myalgias, neck pain and neck stiffness.   Skin:  Negative for rash.   Neurological:  Negative for headaches.   Hematological:  Negative  for adenopathy.           Objective     Blood pressure 132/82, pulse 84, temperature 97.7 °F (36.5 °C), temperature source Infrared, resp. rate 18, weight 93.9 kg (207 lb).    Physical Exam  Vitals and nursing note reviewed.   Constitutional:       Appearance: He is well-developed.      Comments: BMI greater than 30   HENT:      Head: Normocephalic and atraumatic.      Comments: No maxillary or frontal sinus tenderness to palpation.     Right Ear: Tympanic membrane, ear canal and external ear normal.      Left Ear: Tympanic membrane, ear canal and external ear normal.      Mouth/Throat:      Mouth: Mucous membranes are moist. No oral lesions.      Pharynx: Oropharynx is clear.      Comments: Tonsils normal.  Eyes:      Conjunctiva/sclera: Conjunctivae normal.   Cardiovascular:      Rate and Rhythm: Normal rate and regular rhythm.      Heart sounds: Normal heart sounds. No murmur heard.  Pulmonary:      Effort: Pulmonary effort is normal.      Breath sounds: Normal breath sounds.   Musculoskeletal:      Cervical back: Normal range of motion and neck supple.   Lymphadenopathy:      Cervical: No cervical adenopathy.   Skin:     Findings: No rash.   Neurological:      Mental Status: He is alert.   Psychiatric:         Mood and Affect: Mood normal.         Results for orders placed or performed in visit on 01/02/24   POCT SARS-CoV-2 Antigen ADAN + Flu    Specimen: Swab   Result Value Ref Range    SARS Antigen Not Detected Not Detected, Presumptive Negative    Influenza A Antigen ADAN Not Detected Not Detected    Influenza B Antigen ADAN Not Detected Not Detected    Internal Control Passed Passed    Lot Number [id number]     Expiration Date 11/10/24        Assessment & Plan   Diagnoses and all orders for this visit:    1. Upper respiratory tract infection, unspecified type (Primary)  -     brompheniramine-pseudoephedrine-DM 30-2-10 MG/5ML syrup; Take 5 mL by mouth Every 6 (Six) Hours As Needed for Congestion or Cough.   Dispense: 473 mL; Refill: 0   Continue current over the counter medication, and plenty of fluids.  I recommend he notifyi his surgeon that he has an upper respiratory infection and was negative for COVID 19 and Influenza.      2. Acute cough  -     POCT SARS-CoV-2 Antigen ADAN + Flu    Return if symptoms worsen or fail to improve.

## 2024-01-29 ENCOUNTER — TELEPHONE (OUTPATIENT)
Dept: INTERNAL MEDICINE | Facility: CLINIC | Age: 64
End: 2024-01-29
Payer: COMMERCIAL

## 2024-02-05 DIAGNOSIS — F41.9 ANXIETY: ICD-10-CM

## 2024-02-05 RX ORDER — FLUOXETINE HYDROCHLORIDE 40 MG/1
CAPSULE ORAL
Qty: 90 CAPSULE | Refills: 3 | Status: SHIPPED | OUTPATIENT
Start: 2024-02-05

## 2024-03-05 ENCOUNTER — OFFICE VISIT (OUTPATIENT)
Dept: INTERNAL MEDICINE | Facility: CLINIC | Age: 64
End: 2024-03-05
Payer: COMMERCIAL

## 2024-03-05 VITALS
RESPIRATION RATE: 18 BRPM | SYSTOLIC BLOOD PRESSURE: 120 MMHG | HEIGHT: 67 IN | TEMPERATURE: 98 F | DIASTOLIC BLOOD PRESSURE: 78 MMHG | HEART RATE: 80 BPM | WEIGHT: 204 LBS | BODY MASS INDEX: 32.02 KG/M2

## 2024-03-05 DIAGNOSIS — J10.1 INFLUENZA A: Primary | ICD-10-CM

## 2024-03-05 DIAGNOSIS — J06.9 UPPER RESPIRATORY TRACT INFECTION, UNSPECIFIED TYPE: ICD-10-CM

## 2024-03-05 LAB
EXPIRATION DATE: ABNORMAL
EXPIRATION DATE: NORMAL
FLUAV AG UPPER RESP QL IA.RAPID: DETECTED
FLUBV AG UPPER RESP QL IA.RAPID: NOT DETECTED
INTERNAL CONTROL: ABNORMAL
INTERNAL CONTROL: NORMAL
Lab: ABNORMAL
Lab: NORMAL
S PYO AG THROAT QL: NEGATIVE
SARS-COV-2 AG UPPER RESP QL IA.RAPID: NOT DETECTED

## 2024-03-05 PROCEDURE — 87880 STREP A ASSAY W/OPTIC: CPT | Performed by: NURSE PRACTITIONER

## 2024-03-05 PROCEDURE — 99213 OFFICE O/P EST LOW 20 MIN: CPT | Performed by: NURSE PRACTITIONER

## 2024-03-05 RX ORDER — OSELTAMIVIR PHOSPHATE 75 MG/1
75 CAPSULE ORAL 2 TIMES DAILY
Qty: 10 CAPSULE | Refills: 0 | Status: SHIPPED | OUTPATIENT
Start: 2024-03-05

## 2024-03-05 RX ORDER — ONDANSETRON 8 MG/1
8 TABLET, ORALLY DISINTEGRATING ORAL EVERY 8 HOURS PRN
Qty: 20 TABLET | Refills: 0 | Status: SHIPPED | OUTPATIENT
Start: 2024-03-05

## 2024-03-05 RX ORDER — BROMPHENIRAMINE MALEATE, PSEUDOEPHEDRINE HYDROCHLORIDE, AND DEXTROMETHORPHAN HYDROBROMIDE 2; 30; 10 MG/5ML; MG/5ML; MG/5ML
5 SYRUP ORAL EVERY 6 HOURS PRN
Qty: 475 ML | Refills: 0 | Status: SHIPPED | OUTPATIENT
Start: 2024-03-05

## 2024-03-05 NOTE — PROGRESS NOTES
"tamifluyChief Complaint  Cough, URI, Sore Throat, and bodyaches (X3 days.)    Subjective          Koffi Melo presents to [facility]  The patient is a 63-year-old male who presents for evaluation of upper respiratory symptoms.    He has been having upper respiratory symptoms since Tuesday. He denies any sick contacts. He has cough, wheezing, and sore throat. He denies any body aches or fever. He has taken Mucinex. He denies any nausea. He received his influenza vaccine on 11/08/2023.He is recovering from knee surgery.  Cough  Associated symptoms include a sore throat.   URI   Associated symptoms include coughing and a sore throat.   Sore Throat   Associated symptoms include coughing.           Current Outpatient Medications:     ARIPiprazole (ABILIFY) 2 MG tablet, TAKE 1 TABLET BY MOUTH DAILY, Disp: 30 tablet, Rfl: 2    brompheniramine-pseudoephedrine-DM 30-2-10 MG/5ML syrup, Take 5 mL by mouth Every 6 (Six) Hours As Needed for Congestion or Cough., Disp: 475 mL, Rfl: 0    buPROPion XL (WELLBUTRIN XL) 300 MG 24 hr tablet, TAKE 1 TABLET DAILY, Disp: 90 tablet, Rfl: 3    FLUoxetine (PROzac) 40 MG capsule, TAKE 1 CAPSULE DAILY, Disp: 90 capsule, Rfl: 3    ondansetron ODT (ZOFRAN-ODT) 8 MG disintegrating tablet, Place 1 tablet on the tongue Every 8 (Eight) Hours As Needed for Nausea or Vomiting., Disp: 20 tablet, Rfl: 0    oseltamivir (Tamiflu) 75 MG capsule, Take 1 capsule by mouth 2 (Two) Times a Day., Disp: 10 capsule, Rfl: 0         Objective   Vital Signs:   /78 (BP Location: Right arm, Patient Position: Sitting, Cuff Size: Adult)   Pulse 80   Temp 98 °F (36.7 °C) (Infrared)   Resp 18   Ht 170.2 cm (67\")   Wt 92.5 kg (204 lb)   BMI 31.95 kg/m²     Physical Exam  Vitals and nursing note reviewed.   Constitutional:       General: He is not in acute distress.     Appearance: Normal appearance. He is well-developed. He is not ill-appearing. "   HENT:      Head: Normocephalic and atraumatic.      Right Ear: Tympanic membrane normal.      Left Ear: Tympanic membrane normal.      Nose: Congestion present.      Mouth/Throat:      Mouth: Mucous membranes are moist.      Pharynx: Posterior oropharyngeal erythema present.   Eyes:      General:         Right eye: No discharge.         Left eye: No discharge.      Conjunctiva/sclera: Conjunctivae normal.   Neck:      Thyroid: No thyromegaly.   Cardiovascular:      Rate and Rhythm: Normal rate and regular rhythm.   Pulmonary:      Effort: Pulmonary effort is normal.      Breath sounds: Normal breath sounds.   Lymphadenopathy:      Cervical: No cervical adenopathy.   Skin:     Capillary Refill: Capillary refill takes 2 to 3 seconds.      Coloration: Skin is not pale.   Neurological:      Mental Status: He is alert and oriented to person, place, and time.   Psychiatric:         Mood and Affect: Mood normal.         Behavior: Behavior normal.      Right ear is clear. Throat is clear.  Result Review :                 Assessment and Plan    1. Influenza A.  He is positive for influenza A. I will treat him for influenza A. He was advised to stay away from people for the next 3 to 4 days. I will prescribe Bromfed DM. He will take Tamiflu 1 pill twice a day for 5 days to treat the influenza. He was advised to drink fluids and rest. He can take Tylenol and Motrin alternating. If his symptoms are not getting better or at any point getting worse, he will consider secondary infections like sinus infections and bronchitis.  Diagnoses and all orders for this visit:    1. Influenza A (Primary)  -     POCT SARS-CoV-2 + Flu Antigen ADAN  -     POC Rapid Strep A    2. Upper respiratory tract infection, unspecified type  -     brompheniramine-pseudoephedrine-DM 30-2-10 MG/5ML syrup; Take 5 mL by mouth Every 6 (Six) Hours As Needed for Congestion or Cough.  Dispense: 475 mL; Refill: 0    Other orders  -     oseltamivir (Tamiflu) 75 MG  capsule; Take 1 capsule by mouth 2 (Two) Times a Day.  Dispense: 10 capsule; Refill: 0  -     ondansetron ODT (ZOFRAN-ODT) 8 MG disintegrating tablet; Place 1 tablet on the tongue Every 8 (Eight) Hours As Needed for Nausea or Vomiting.  Dispense: 20 tablet; Refill: 0          Follow Up   Return if symptoms worsen or fail to improve.  Patient was given instructions and counseling regarding his condition or for health maintenance advice. Please see specific information pulled into the AVS if appropriate.     RTC/call  If symptoms worsen  Meds MOA and SE's reviewed and pt v/u    JENNIFER Meyers [facility]  [address] [place] [phone]  Fax [phone]  Phone [phone]    Transcribed from ambient dictation for JENNIFER Meyers by Luba Canseco.  03/05/24   17:21 EST    Patient or patient representative verbalized consent to the visit recording.  I have personally performed the services described in this document as transcribed by the above individual, and it is both accurate and complete.

## 2024-03-29 DIAGNOSIS — F41.9 ANXIETY: ICD-10-CM

## 2024-03-29 RX ORDER — ARIPIPRAZOLE 2 MG/1
2 TABLET ORAL DAILY
Qty: 30 TABLET | Refills: 2 | Status: SHIPPED | OUTPATIENT
Start: 2024-03-29

## 2024-05-01 ENCOUNTER — OFFICE VISIT (OUTPATIENT)
Dept: INTERNAL MEDICINE | Facility: CLINIC | Age: 64
End: 2024-05-01
Payer: COMMERCIAL

## 2024-05-01 VITALS
DIASTOLIC BLOOD PRESSURE: 74 MMHG | TEMPERATURE: 98 F | RESPIRATION RATE: 16 BRPM | BODY MASS INDEX: 33.2 KG/M2 | WEIGHT: 212 LBS | SYSTOLIC BLOOD PRESSURE: 132 MMHG | HEART RATE: 68 BPM

## 2024-05-01 DIAGNOSIS — G47.30 SLEEP APNEA, UNSPECIFIED TYPE: Primary | ICD-10-CM

## 2024-05-01 PROCEDURE — 99213 OFFICE O/P EST LOW 20 MIN: CPT | Performed by: INTERNAL MEDICINE

## 2024-05-01 NOTE — PROGRESS NOTES
Chief Complaint   Patient presents with    Sleep apnea       History of Present Illness    He presents for a sleep apnea follow-up. He states that he is not sleeping well. He has symptoms of daytime sleepiness, drowsiness while driving, snoring, waking himself gasping, choking while sleeping and witnessed apnea but denies falling asleep while he is driving, fatigue or problems with concentration. He is not using CPAP or BiPAP. He has been diagnosed with sleep apnea more than 5 years ago and used CPAP briefly. The machine was recalled > 4 years ago and he hasn't used treatment since then. He does not remember his settings.    Review of Systems    CONSTITUTIONAL- Denies Unexplained Weight Loss, Fever, Chills, Sweats, Weakness or Malaise.    NECK- Denies Decreased Range of Motion, Stiffness, Thyroid Nodules, Enlarged Lymph Nodes or Goiter.    HENT- Denies Nasal Discharge, Sore Throat, Ear Pain, Ear Drainage, Headache, Sinus Pain, Nasal Congestion, Decreased Hearing or Tinnitus.    PULMONARY- Denies Wheezing, Dyspnea, Sputum Production, Cough, Hemoptysis or Pleuritic Chest Pain.    CARDIOVASCULAR- Denies Chest Pain, Claudication, Edema, Syncope, Palpitations or Irregular Heart Beat.    Medications      Current Outpatient Medications:     ARIPiprazole (ABILIFY) 2 MG tablet, TAKE 1 TABLET BY MOUTH DAILY, Disp: 30 tablet, Rfl: 2    brompheniramine-pseudoephedrine-DM 30-2-10 MG/5ML syrup, Take 5 mL by mouth Every 6 (Six) Hours As Needed for Congestion or Cough., Disp: 475 mL, Rfl: 0    buPROPion XL (WELLBUTRIN XL) 300 MG 24 hr tablet, TAKE 1 TABLET DAILY, Disp: 90 tablet, Rfl: 3    FLUoxetine (PROzac) 40 MG capsule, TAKE 1 CAPSULE DAILY, Disp: 90 capsule, Rfl: 3    ondansetron ODT (ZOFRAN-ODT) 8 MG disintegrating tablet, Place 1 tablet on the tongue Every 8 (Eight) Hours As Needed for Nausea or Vomiting., Disp: 20 tablet, Rfl: 0     Allergies    Allergies   Allergen Reactions    Hydrocodone-Acetaminophen Itching     Oxycodone-Acetaminophen Itching       Problem List    Patient Active Problem List   Diagnosis    Anxiety    PFO (patent foramen ovale)    History of CVA (cerebrovascular accident)    Obesity    Concentration deficit    Microangiopathy    Pre-operative clearance    Rotator cuff injury, left, initial encounter    Biceps tendinitis of left upper extremity    Impingement syndrome of left shoulder    Bursitis of left shoulder    Traumatic incomplete tear of left rotator cuff    Subluxation of tendon of long head of biceps    Superior glenoid labrum lesion of left shoulder    S/P left rotator cuff repair       Medications, Allergies, Problems List and Past History were reviewed and updated.    Physical Examination    /74 (BP Location: Right arm, Patient Position: Sitting, Cuff Size: Adult)   Pulse 68   Temp 98 °F (36.7 °C) (Infrared)   Resp 16   Wt 96.2 kg (212 lb)   BMI 33.20 kg/m²       HEENT: Head- Normocephalic Atraumatic. Facies- Within normal limits. Pinnas- Normal texture and shape bilaterally. Canals- Normal bilaterally. TMs- Normal bilaterally. Nares- Patent bilaterally. Nasal Septum- is normal. There is no tenderness to palpation over the frontal or maxillary sinuses. Lids- Normal bilaterally. Conjunctiva- Clear bilaterally. Sclera- Anicteric bilaterally. Oropharynx- Moist with no lesions. Tonsils- No enlargement, erythema or exudate.    Neck: Thyroid- non enlarged, symmetric and has no nodules. No bruits are detected. ROM- Normal Range of Motion with no rigidity.    Lungs: Auscultation- Clear to auscultation bilaterally. There are no retractions, clubbing or cyanosis. The Expiratory to Inspiratory ratio is equal.    Cardiovascular: There are no carotid bruits. Heart- Normal Rate with Regular rhythm and no murmurs. There are no gallops. There are no rubs. In the lower extremities there is no edema. The upper extremities do not have edema.    Impression and Assessment    Obstructive Sleep  Apnea.    Plan    Obstructive Sleep Apnea Plan: A referral was made to sleep medicine.    Diagnoses and all orders for this visit:    1. Sleep apnea, unspecified type (Primary)  -     Ambulatory Referral to Sleep Medicine        Return to Office    The patient was instructed to return for follow-up in 4 months. The patient was instructed to return sooner if the condition changes, worsens, or does not resolve.

## 2024-07-03 DIAGNOSIS — F41.9 ANXIETY: ICD-10-CM

## 2024-07-03 RX ORDER — ARIPIPRAZOLE 2 MG/1
2 TABLET ORAL DAILY
Qty: 90 TABLET | Refills: 1 | Status: SHIPPED | OUTPATIENT
Start: 2024-07-03

## 2024-08-22 RX ORDER — BUPROPION HYDROCHLORIDE 300 MG/1
TABLET ORAL
Qty: 90 TABLET | Refills: 1 | Status: SHIPPED | OUTPATIENT
Start: 2024-08-22

## 2024-09-03 ENCOUNTER — OFFICE VISIT (OUTPATIENT)
Dept: INTERNAL MEDICINE | Facility: CLINIC | Age: 64
End: 2024-09-03
Payer: COMMERCIAL

## 2024-09-03 VITALS
BODY MASS INDEX: 32.58 KG/M2 | HEART RATE: 76 BPM | DIASTOLIC BLOOD PRESSURE: 84 MMHG | SYSTOLIC BLOOD PRESSURE: 132 MMHG | WEIGHT: 208 LBS | RESPIRATION RATE: 16 BRPM | TEMPERATURE: 97.9 F

## 2024-09-03 DIAGNOSIS — K64.9 HEMORRHOIDS, UNSPECIFIED HEMORRHOID TYPE: Primary | ICD-10-CM

## 2024-09-03 PROCEDURE — 99213 OFFICE O/P EST LOW 20 MIN: CPT | Performed by: INTERNAL MEDICINE

## 2024-09-03 NOTE — PROGRESS NOTES
Chief Complaint   Patient presents with    bump inside of rectum       History of Present Illness    A few weeks ago was showering and felt a bump on the left rectal area.  No bleeding.  Mild pain with manipulation.  Gets larger and smaller.   No constipation.     Medications      Current Outpatient Medications:     ARIPiprazole (ABILIFY) 2 MG tablet, TAKE 1 TABLET BY MOUTH DAILY, Disp: 90 tablet, Rfl: 1    buPROPion XL (WELLBUTRIN XL) 300 MG 24 hr tablet, TAKE 1 TABLET DAILY, Disp: 90 tablet, Rfl: 1    FLUoxetine (PROzac) 40 MG capsule, TAKE 1 CAPSULE DAILY, Disp: 90 capsule, Rfl: 3    ondansetron ODT (ZOFRAN-ODT) 8 MG disintegrating tablet, Place 1 tablet on the tongue Every 8 (Eight) Hours As Needed for Nausea or Vomiting., Disp: 20 tablet, Rfl: 0    brompheniramine-pseudoephedrine-DM 30-2-10 MG/5ML syrup, Take 5 mL by mouth Every 6 (Six) Hours As Needed for Congestion or Cough. (Patient not taking: Reported on 9/3/2024), Disp: 475 mL, Rfl: 0     Allergies    Allergies   Allergen Reactions    Hydrocodone-Acetaminophen Itching    Oxycodone-Acetaminophen Itching       Problem List    Patient Active Problem List   Diagnosis    Anxiety    PFO (patent foramen ovale)    History of CVA (cerebrovascular accident)    Obesity    Concentration deficit    Microangiopathy    Pre-operative clearance    Rotator cuff injury, left, initial encounter    Biceps tendinitis of left upper extremity    Impingement syndrome of left shoulder    Bursitis of left shoulder    Traumatic incomplete tear of left rotator cuff    Subluxation of tendon of long head of biceps    Superior glenoid labrum lesion of left shoulder    S/P left rotator cuff repair       Medications, Allergies, Problems List and Past History were reviewed and updated.    Physical Examination    /84 (BP Location: Left arm, Patient Position: Sitting, Cuff Size: Adult)   Pulse 76   Temp 97.9 °F (36.6 °C) (Infrared)   Resp 16   Wt 94.3 kg (208 lb)   BMI 32.58 kg/m²      Abdomen: Soft, benign, non-tender with no masses, hernias, organomegaly or scars.    Rectal: reveals normal external sphincter tone with non-thrombosed external hemorrhoids.    Impression and Assessment    Hemorrhoids.    Plan    Hemorrhoids Plan: He was instructed to use stool softeners daily. He was referred to surgery. Warm soaks were encouraged.    Diagnoses and all orders for this visit:    1. Hemorrhoids, unspecified hemorrhoid type (Primary)  -     Ambulatory Referral to Colorectal Surgery        Return to Office    The patient was instructed to return for follow-up in 1 month. The patient was instructed to return sooner if the condition changes, worsens, or does not resolve.

## 2024-09-11 ENCOUNTER — OFFICE VISIT (OUTPATIENT)
Dept: INTERNAL MEDICINE | Facility: CLINIC | Age: 64
End: 2024-09-11
Payer: COMMERCIAL

## 2024-09-11 VITALS
HEART RATE: 80 BPM | WEIGHT: 210.6 LBS | TEMPERATURE: 96.8 F | DIASTOLIC BLOOD PRESSURE: 76 MMHG | BODY MASS INDEX: 31.92 KG/M2 | OXYGEN SATURATION: 98 % | SYSTOLIC BLOOD PRESSURE: 138 MMHG | RESPIRATION RATE: 20 BRPM | HEIGHT: 68 IN

## 2024-09-11 DIAGNOSIS — F41.9 ANXIETY: ICD-10-CM

## 2024-09-11 DIAGNOSIS — Z00.00 PHYSICAL EXAM: Primary | ICD-10-CM

## 2024-09-11 DIAGNOSIS — Z13.6 SCREENING FOR CARDIOVASCULAR CONDITION: ICD-10-CM

## 2024-09-11 DIAGNOSIS — Z12.5 SCREENING FOR PROSTATE CANCER: ICD-10-CM

## 2024-09-11 LAB
ALBUMIN SERPL-MCNC: 4.2 G/DL (ref 3.5–5.2)
ALBUMIN/GLOB SERPL: 1.6 G/DL
ALP SERPL-CCNC: 81 U/L (ref 39–117)
ALT SERPL W P-5'-P-CCNC: 19 U/L (ref 1–41)
ANION GAP SERPL CALCULATED.3IONS-SCNC: 10.1 MMOL/L (ref 5–15)
AST SERPL-CCNC: 21 U/L (ref 1–40)
BASOPHILS # BLD AUTO: 0.06 10*3/MM3 (ref 0–0.2)
BASOPHILS NFR BLD AUTO: 0.9 % (ref 0–1.5)
BILIRUB BLD-MCNC: NEGATIVE MG/DL
BILIRUB SERPL-MCNC: 0.3 MG/DL (ref 0–1.2)
BUN SERPL-MCNC: 14 MG/DL (ref 8–23)
BUN/CREAT SERPL: 10.7 (ref 7–25)
CALCIUM SPEC-SCNC: 9.4 MG/DL (ref 8.6–10.5)
CHLORIDE SERPL-SCNC: 107 MMOL/L (ref 98–107)
CHOLEST SERPL-MCNC: 218 MG/DL (ref 0–200)
CLARITY, POC: CLEAR
CO2 SERPL-SCNC: 23.9 MMOL/L (ref 22–29)
COLOR UR: YELLOW
CREAT SERPL-MCNC: 1.31 MG/DL (ref 0.76–1.27)
DEPRECATED RDW RBC AUTO: 41.1 FL (ref 37–54)
EGFRCR SERPLBLD CKD-EPI 2021: 61.9 ML/MIN/1.73
EOSINOPHIL # BLD AUTO: 0.18 10*3/MM3 (ref 0–0.4)
EOSINOPHIL NFR BLD AUTO: 2.7 % (ref 0.3–6.2)
ERYTHROCYTE [DISTWIDTH] IN BLOOD BY AUTOMATED COUNT: 13 % (ref 12.3–15.4)
EXPIRATION DATE: NORMAL
GLOBULIN UR ELPH-MCNC: 2.6 GM/DL
GLUCOSE SERPL-MCNC: 90 MG/DL (ref 65–99)
GLUCOSE UR STRIP-MCNC: NEGATIVE MG/DL
HCT VFR BLD AUTO: 48.1 % (ref 37.5–51)
HDLC SERPL-MCNC: 42 MG/DL (ref 40–60)
HGB BLD-MCNC: 15.8 G/DL (ref 13–17.7)
IMM GRANULOCYTES # BLD AUTO: 0.03 10*3/MM3 (ref 0–0.05)
IMM GRANULOCYTES NFR BLD AUTO: 0.4 % (ref 0–0.5)
KETONES UR QL: NEGATIVE
LDLC SERPL CALC-MCNC: 154 MG/DL (ref 0–100)
LDLC/HDLC SERPL: 3.61 {RATIO}
LEUKOCYTE EST, POC: NEGATIVE
LYMPHOCYTES # BLD AUTO: 0.98 10*3/MM3 (ref 0.7–3.1)
LYMPHOCYTES NFR BLD AUTO: 14.5 % (ref 19.6–45.3)
Lab: NORMAL
MCH RBC QN AUTO: 29.1 PG (ref 26.6–33)
MCHC RBC AUTO-ENTMCNC: 32.8 G/DL (ref 31.5–35.7)
MCV RBC AUTO: 88.6 FL (ref 79–97)
MONOCYTES # BLD AUTO: 0.56 10*3/MM3 (ref 0.1–0.9)
MONOCYTES NFR BLD AUTO: 8.3 % (ref 5–12)
NEUTROPHILS NFR BLD AUTO: 4.95 10*3/MM3 (ref 1.7–7)
NEUTROPHILS NFR BLD AUTO: 73.2 % (ref 42.7–76)
NITRITE UR-MCNC: NEGATIVE MG/ML
NRBC BLD AUTO-RTO: 0 /100 WBC (ref 0–0.2)
PH UR: 5 [PH] (ref 5–8)
PLATELET # BLD AUTO: 230 10*3/MM3 (ref 140–450)
PMV BLD AUTO: 10.1 FL (ref 6–12)
POTASSIUM SERPL-SCNC: 4.1 MMOL/L (ref 3.5–5.2)
PROT SERPL-MCNC: 6.8 G/DL (ref 6–8.5)
PROT UR STRIP-MCNC: NEGATIVE MG/DL
PSA SERPL-MCNC: 1.6 NG/ML (ref 0–4)
RBC # BLD AUTO: 5.43 10*6/MM3 (ref 4.14–5.8)
RBC # UR STRIP: NEGATIVE /UL
SODIUM SERPL-SCNC: 141 MMOL/L (ref 136–145)
SP GR UR: 1.01 (ref 1–1.03)
TRIGL SERPL-MCNC: 121 MG/DL (ref 0–150)
TSH SERPL DL<=0.05 MIU/L-ACNC: 1.78 UIU/ML (ref 0.27–4.2)
UROBILINOGEN UR QL: NORMAL
VLDLC SERPL-MCNC: 22 MG/DL (ref 5–40)
WBC NRBC COR # BLD AUTO: 6.76 10*3/MM3 (ref 3.4–10.8)

## 2024-09-11 PROCEDURE — G0103 PSA SCREENING: HCPCS | Performed by: INTERNAL MEDICINE

## 2024-09-11 PROCEDURE — 80050 GENERAL HEALTH PANEL: CPT | Performed by: INTERNAL MEDICINE

## 2024-09-11 PROCEDURE — 81003 URINALYSIS AUTO W/O SCOPE: CPT | Performed by: INTERNAL MEDICINE

## 2024-09-11 PROCEDURE — 99396 PREV VISIT EST AGE 40-64: CPT | Performed by: INTERNAL MEDICINE

## 2024-09-11 PROCEDURE — 80061 LIPID PANEL: CPT | Performed by: INTERNAL MEDICINE

## 2024-09-11 NOTE — PROGRESS NOTES
Chief Complaint   Patient presents with    Annual Exam       History of Present Illness      The patient presents for an established patient physical examination and health maintenance visit.    The patient presents for a follow-up related to anxiety. He denies currently having anxiety symptoms. He is not having panic attacks. His energy level is good. He denies agorophobia. He sleeps well. He is currently taking a medication. He states that his current anxiety symptoms are stable. The current medication regimen consists of fluoxetine and Wellbutrin XL. The patient denies having medication side effects including nausea, headaches, anxiety, increased depression or fatigue.    Medications      Current Outpatient Medications:     ARIPiprazole (ABILIFY) 2 MG tablet, TAKE 1 TABLET BY MOUTH DAILY, Disp: 90 tablet, Rfl: 1    brompheniramine-pseudoephedrine-DM 30-2-10 MG/5ML syrup, Take 5 mL by mouth Every 6 (Six) Hours As Needed for Congestion or Cough., Disp: 475 mL, Rfl: 0    buPROPion XL (WELLBUTRIN XL) 300 MG 24 hr tablet, TAKE 1 TABLET DAILY, Disp: 90 tablet, Rfl: 1    FLUoxetine (PROzac) 40 MG capsule, TAKE 1 CAPSULE DAILY, Disp: 90 capsule, Rfl: 3    ondansetron ODT (ZOFRAN-ODT) 8 MG disintegrating tablet, Place 1 tablet on the tongue Every 8 (Eight) Hours As Needed for Nausea or Vomiting., Disp: 20 tablet, Rfl: 0     Allergies    Allergies   Allergen Reactions    Hydrocodone-Acetaminophen Itching    Oxycodone-Acetaminophen Itching       Problem List    Patient Active Problem List   Diagnosis    Anxiety    PFO (patent foramen ovale)    History of CVA (cerebrovascular accident)    Obesity    Concentration deficit    Microangiopathy    Pre-operative clearance    Rotator cuff injury, left, initial encounter    Biceps tendinitis of left upper extremity    Impingement syndrome of left shoulder    Bursitis of left shoulder    Traumatic incomplete tear of left rotator cuff    Subluxation of tendon of long head of biceps     "Superior glenoid labrum lesion of left shoulder    S/P left rotator cuff repair       Medications, Allergies, Problems List and Past History were reviewed and updated.    Physical Examination    /76 (BP Location: Right arm, Patient Position: Sitting, Cuff Size: Adult)   Pulse 80   Temp 96.8 °F (36 °C) (Infrared)   Resp 20   Ht 171.5 cm (67.5\")   Wt 95.5 kg (210 lb 9.6 oz)   SpO2 98%   BMI 32.50 kg/m²       HEENT: Head- Normocephalic Atraumatic. Facies- Within normal limits. Pinnas- Normal texture and shape bilaterally. Canals- Normal bilaterally. TMs- Normal bilaterally. Nares- Patent bilaterally. Nasal Septum- is normal. There is no tenderness to palpation over the frontal or maxillary sinuses. Lids- Normal bilaterally. Conjunctiva- Clear bilaterally. Sclera- Anicteric bilaterally. Oropharynx- Moist with no lesions. Tonsils- No enlargement, erythema or exudate.    Neck: Thyroid- non enlarged, symmetric and has no nodules. No bruits are detected. ROM- Normal Range of Motion with no rigidity.    Lungs: Auscultation- Clear to auscultation bilaterally. There are no retractions, clubbing or cyanosis. The Expiratory to Inspiratory ratio is equal.    Lymph Nodes: Cervical- no enlarged lymph nodes noted. Clavicular- no enlarged supraclavicular lymph nodes noted. Axillary- no enlarged axillary lymph nodes noted. Inguinal- no enlarged inguinal lymph nodes noted.    Cardiovascular: There are no carotid bruits. Heart- Normal Rate with Regular rhythm and no murmurs. There are no gallops. There are no rubs. In the lower extremities there is no edema. The upper extremities do not have edema.    Abdomen: Soft, benign, non-tender with no masses, hernias, organomegaly or scars.    Male Genitourinary: The penis is circumcised with testicles found in the scrotum bilaterally. Testicular Size is normal bilaterally. The penis has no anatomic abnormalities.    Rectal: reveals normal external sphincter tone with no external " lesions.    Prostate: The prostate gland is symmetric and smooth with no nodularity.    Dermatologic: The patient has no worrisome or suspicious skin lesions noted.    Impression and Assessment    Normal Physical Examination.    Encounter for Screening for Malignant Neoplasm of the Prostate.    Anxiety Disorder Unspecified.    Plan    Anxiety Disorder Unspecified Plan: The patient was instructed to continue the current medications.    Counseling was provided regarding: Adequate Aerobic Exercise, Dental Visits, Flossing Teeth, Heart Healthy Diet, Seat Belt Utilization and Weight Lose.    The following was ordered for screening and health maintenance: CBC w Automated Diff, CMP, Lipid Profile, PSA, TSH and U/A.       Immunizations Ordered and Administered: None.    Diagnoses and all orders for this visit:    1. Physical exam (Primary)  -     CBC & Differential; Future  -     Comprehensive Metabolic Panel; Future  -     TSH; Future  -     POC Urinalysis Dipstick, Automated; Future    2. Anxiety    3. Screening for prostate cancer  -     PSA Screen; Future    4. Screening for cardiovascular condition  -     Lipid Panel; Future      BMI is >= 30 and <35. (Class 1 Obesity). The following options were offered after discussion;: weight loss educational material (shared in after visit summary), exercise counseling/recommendations, nutrition counseling/recommendations, and Information on healthy weight added to patient's after visit summary.    Return to Office    The patient was instructed to return for follow-up in 1 year. The patient was instructed to return sooner if the condition changes, worsens, or does not resolve.      BMI is >= 30 and <35. (Class 1 Obesity). The following options were offered after discussion;: weight loss educational material (shared in after visit summary), exercise counseling/recommendations, nutrition counseling/recommendations, and Information on healthy weight added to patient's after visit  summary.

## 2024-11-15 NOTE — TELEPHONE ENCOUNTER
Patient states this med was supposed to be sent to [facility] as it is much cheaper for him.  Patient can be reached at [phone] to figure out what he can do as he is out of meds.     normal for race

## 2024-11-27 ENCOUNTER — OFFICE VISIT (OUTPATIENT)
Dept: INTERNAL MEDICINE | Facility: CLINIC | Age: 64
End: 2024-11-27
Payer: COMMERCIAL

## 2024-11-27 VITALS
RESPIRATION RATE: 16 BRPM | TEMPERATURE: 97.1 F | HEART RATE: 64 BPM | WEIGHT: 223.8 LBS | SYSTOLIC BLOOD PRESSURE: 134 MMHG | BODY MASS INDEX: 33.92 KG/M2 | DIASTOLIC BLOOD PRESSURE: 80 MMHG | HEIGHT: 68 IN

## 2024-11-27 DIAGNOSIS — J06.9 UPPER RESPIRATORY TRACT INFECTION, UNSPECIFIED TYPE: Primary | ICD-10-CM

## 2024-11-27 DIAGNOSIS — R05.9 COUGH, UNSPECIFIED TYPE: ICD-10-CM

## 2024-11-27 PROCEDURE — 87428 SARSCOV & INF VIR A&B AG IA: CPT | Performed by: NURSE PRACTITIONER

## 2024-11-27 PROCEDURE — 99213 OFFICE O/P EST LOW 20 MIN: CPT | Performed by: NURSE PRACTITIONER

## 2024-11-27 RX ORDER — DOXYCYCLINE 100 MG/1
100 CAPSULE ORAL 2 TIMES DAILY
Qty: 20 CAPSULE | Refills: 0 | Status: SHIPPED | OUTPATIENT
Start: 2024-11-27

## 2024-11-27 RX ORDER — DEXTROMETHORPHAN HYDROBROMIDE AND PROMETHAZINE HYDROCHLORIDE 15; 6.25 MG/5ML; MG/5ML
5 SYRUP ORAL 3 TIMES DAILY
Qty: 60 ML | Refills: 0 | Status: SHIPPED | OUTPATIENT
Start: 2024-11-27

## 2024-11-27 NOTE — PROGRESS NOTES
Patient Name: Koffi Melo  : 1960   MRN: [id number]     Chief Complaint:    Chief Complaint   Patient presents with    Cough     X 2 days    Generalized Body Aches    Nasal Congestion       History of Present Illness: Koffi Melo is a 63 y.o. male.  History of Present Illness  The patient is a 63-year-old male who presents for evaluation of a cough that developed 4 days ago.    He reports no significant congestion last week. He is not experiencing shortness of breath or wheezing but describes a general feeling of malaise, fatigue, and body aches. He has not had a fever, nausea, vomiting, or diarrhea. He also reports no recent illnesses in his close contacts. He expresses concern about potential COVID-19 infection due to an upcoming visit from his 94-year-old father-in-law for Thanksgiving. He reports a scratchy throat but no ear pain or abdominal discomfort.    MEDICATIONS  Current: Wellbutrin         Subjective     Review of System: Review of Systems     Medications:     Current Outpatient Medications:     ARIPiprazole (ABILIFY) 2 MG tablet, TAKE 1 TABLET BY MOUTH DAILY, Disp: 90 tablet, Rfl: 1    buPROPion XL (WELLBUTRIN XL) 300 MG 24 hr tablet, TAKE 1 TABLET DAILY, Disp: 90 tablet, Rfl: 1    FLUoxetine (PROzac) 40 MG capsule, TAKE 1 CAPSULE DAILY, Disp: 90 capsule, Rfl: 3    doxycycline (VIBRAMYCIN) 100 MG capsule, Take 1 capsule by mouth 2 (Two) Times a Day., Disp: 20 capsule, Rfl: 0    promethazine-dextromethorphan (PROMETHAZINE-DM) 6.25-15 MG/5ML syrup, Take 5 mL by mouth 3 (Three) Times a Day., Disp: 60 mL, Rfl: 0    Allergies:   Allergies   Allergen Reactions    Hydrocodone-Acetaminophen Itching    Oxycodone-Acetaminophen Itching       Objective     Physical Exam:   Vital Signs:   Vitals:    24 0814   BP: 134/80   BP Location: Right arm   Patient Position: Sitting   Cuff Size: Adult   Pulse: 64   Resp: 16   Temp: 97.1 °F (36.2 °C)   TempSrc: Infrared   Weight: 102  "kg (223 lb 12.8 oz)   Height: 171.5 cm (67.5\")   PainSc: 0-No pain           Physical Exam  Physical Exam  The patient appears ill.  Tympanic membranes are normal. Pharynx is clear.  No cervical adenopathy in the neck.  Lungs are clear.  Heart rate is regular.  Abdomen is soft and nontender.       Results       Assessment / Plan      Assessment/Plan:   Diagnoses and all orders for this visit:    1. Upper respiratory tract infection, unspecified type (Primary)  -     doxycycline (VIBRAMYCIN) 100 MG capsule; Take 1 capsule by mouth 2 (Two) Times a Day.  Dispense: 20 capsule; Refill: 0    2. Cough, unspecified type  -     POCT SARS-CoV-2 + Flu Antigen ADAN  -     promethazine-dextromethorphan (PROMETHAZINE-DM) 6.25-15 MG/5ML syrup; Take 5 mL by mouth 3 (Three) Times a Day.  Dispense: 60 mL; Refill: 0       Assessment & Plan  1. Upper respiratory infection.  A prescription for doxycycline has been provided to be used if symptoms worsen. He is advised to take the medication with food and a probiotic, avoid lying down for 30 minutes post-administration, separate it from vitamins, and complete the full course of antibiotics. A potential interaction with Wellbutrin was discussed, and he will monitor for any side effects and contact the office with any concerns or questions. All queries were addressed during this appointment. If symptoms do not improve or worsen, he will follow up.         Patient or patient representative verbalized consent for the use of Ambient Listening during the visit with  JENNIFER Holguin for chart documentation. 12/1/2024  13:59 EST    JENNIFER Holguin  [facility]  "

## 2024-12-02 ENCOUNTER — TELEPHONE (OUTPATIENT)
Dept: INTERNAL MEDICINE | Facility: CLINIC | Age: 64
End: 2024-12-02
Payer: COMMERCIAL

## 2024-12-02 RX ORDER — AZITHROMYCIN 250 MG/1
TABLET, FILM COATED ORAL
Qty: 6 TABLET | Refills: 0 | Status: SHIPPED | OUTPATIENT
Start: 2024-12-02

## 2024-12-02 RX ORDER — METHYLPREDNISOLONE 4 MG/1
TABLET ORAL
Qty: 21 TABLET | Refills: 0 | Status: SHIPPED | OUTPATIENT
Start: 2024-12-02

## 2024-12-02 NOTE — TELEPHONE ENCOUNTER
"  Caller: Koffi Melo \"Yovani\"    Relationship: Self    Best call back number: [phone]       Who are you requesting to speak with (clinical staff, provider,  specific staff member): CLINICAL      What was the call regarding: DOXUCYCLINE MAKES HIM NAUSEATED, PLEASE ADVISE, REQUESTING STEROIDS OR SOMETHING ELSE    Is it okay if the provider responds through MyChart:   "

## 2024-12-02 NOTE — TELEPHONE ENCOUNTER
I have sent in a z-nancy and a steroid pack.  Patient notified via Lastline message.   Daniel Salazar MD  17:48 EST  12/02/24

## 2025-01-28 DIAGNOSIS — F41.9 ANXIETY: ICD-10-CM

## 2025-01-28 RX ORDER — FLUOXETINE 40 MG/1
CAPSULE ORAL
Qty: 90 CAPSULE | Refills: 1 | Status: SHIPPED | OUTPATIENT
Start: 2025-01-28

## 2025-02-05 DIAGNOSIS — F41.9 ANXIETY: ICD-10-CM

## 2025-02-05 RX ORDER — ARIPIPRAZOLE 2 MG/1
2 TABLET ORAL DAILY
Qty: 90 TABLET | Refills: 1 | Status: SHIPPED | OUTPATIENT
Start: 2025-02-05

## 2025-02-18 RX ORDER — BUPROPION HYDROCHLORIDE 300 MG/1
TABLET ORAL
Qty: 90 TABLET | Refills: 3 | Status: SHIPPED | OUTPATIENT
Start: 2025-02-18

## 2025-05-09 ENCOUNTER — OFFICE VISIT (OUTPATIENT)
Dept: INTERNAL MEDICINE | Facility: CLINIC | Age: 65
End: 2025-05-09
Payer: COMMERCIAL

## 2025-05-09 VITALS
HEART RATE: 80 BPM | WEIGHT: 216.38 LBS | BODY MASS INDEX: 33.39 KG/M2 | DIASTOLIC BLOOD PRESSURE: 64 MMHG | SYSTOLIC BLOOD PRESSURE: 118 MMHG | RESPIRATION RATE: 18 BRPM | TEMPERATURE: 98 F

## 2025-05-09 DIAGNOSIS — U07.1 COVID-19 VIRUS INFECTION: Primary | ICD-10-CM

## 2025-05-09 DIAGNOSIS — R05.1 ACUTE COUGH: ICD-10-CM

## 2025-05-09 LAB
EXPIRATION DATE: ABNORMAL
EXPIRATION DATE: NORMAL
FLUAV AG UPPER RESP QL IA.RAPID: NOT DETECTED
FLUBV AG UPPER RESP QL IA.RAPID: NOT DETECTED
INTERNAL CONTROL: ABNORMAL
INTERNAL CONTROL: NORMAL
Lab: ABNORMAL
Lab: NORMAL
S PYO AG THROAT QL: NEGATIVE
SARS-COV-2 AG UPPER RESP QL IA.RAPID: DETECTED

## 2025-05-09 PROCEDURE — 99214 OFFICE O/P EST MOD 30 MIN: CPT | Performed by: INTERNAL MEDICINE

## 2025-05-09 PROCEDURE — 87880 STREP A ASSAY W/OPTIC: CPT | Performed by: INTERNAL MEDICINE

## 2025-05-09 PROCEDURE — 87428 SARSCOV & INF VIR A&B AG IA: CPT | Performed by: INTERNAL MEDICINE

## 2025-05-09 RX ORDER — METHYLPREDNISOLONE 4 MG/1
TABLET ORAL
Qty: 1 EACH | Refills: 0 | Status: SHIPPED | OUTPATIENT
Start: 2025-05-09

## 2025-05-30 ENCOUNTER — OFFICE VISIT (OUTPATIENT)
Dept: INTERNAL MEDICINE | Facility: CLINIC | Age: 65
End: 2025-05-30
Payer: COMMERCIAL

## 2025-05-30 ENCOUNTER — TELEPHONE (OUTPATIENT)
Dept: INTERNAL MEDICINE | Facility: CLINIC | Age: 65
End: 2025-05-30
Payer: COMMERCIAL

## 2025-05-30 VITALS
HEIGHT: 68 IN | TEMPERATURE: 98.4 F | BODY MASS INDEX: 32.25 KG/M2 | SYSTOLIC BLOOD PRESSURE: 134 MMHG | RESPIRATION RATE: 16 BRPM | WEIGHT: 212.8 LBS | DIASTOLIC BLOOD PRESSURE: 86 MMHG | HEART RATE: 86 BPM

## 2025-05-30 DIAGNOSIS — F32.A ANXIETY AND DEPRESSION: Primary | ICD-10-CM

## 2025-05-30 DIAGNOSIS — F41.9 ANXIETY AND DEPRESSION: Primary | ICD-10-CM

## 2025-05-30 DIAGNOSIS — R39.9 LOWER URINARY TRACT SYMPTOMS: ICD-10-CM

## 2025-05-30 DIAGNOSIS — N52.9 ERECTILE DYSFUNCTION, UNSPECIFIED ERECTILE DYSFUNCTION TYPE: ICD-10-CM

## 2025-05-30 DIAGNOSIS — G47.9 SLEEPING DIFFICULTY: ICD-10-CM

## 2025-05-30 LAB
BILIRUB BLD-MCNC: NEGATIVE MG/DL
CLARITY, POC: CLEAR
COLOR UR: YELLOW
EXPIRATION DATE: ABNORMAL
GLUCOSE UR STRIP-MCNC: NEGATIVE MG/DL
KETONES UR QL: NEGATIVE
LEUKOCYTE EST, POC: NEGATIVE
Lab: ABNORMAL
NITRITE UR-MCNC: NEGATIVE MG/ML
PH UR: 5 [PH] (ref 5–8)
PROT UR STRIP-MCNC: ABNORMAL MG/DL
RBC # UR STRIP: NEGATIVE /UL
SP GR UR: 1.02 (ref 1–1.03)
UROBILINOGEN UR QL: NORMAL

## 2025-05-30 PROCEDURE — 81003 URINALYSIS AUTO W/O SCOPE: CPT | Performed by: NURSE PRACTITIONER

## 2025-05-30 PROCEDURE — 99214 OFFICE O/P EST MOD 30 MIN: CPT | Performed by: NURSE PRACTITIONER

## 2025-05-30 RX ORDER — BUPROPION HYDROCHLORIDE 150 MG/1
TABLET ORAL
Qty: 90 TABLET | Refills: 0 | Status: SHIPPED | OUTPATIENT
Start: 2025-05-30

## 2025-05-30 RX ORDER — TADALAFIL 20 MG/1
TABLET ORAL
Qty: 30 TABLET | Refills: 0 | Status: SHIPPED | OUTPATIENT
Start: 2025-05-30

## 2025-05-30 NOTE — TELEPHONE ENCOUNTER
"    Caller: Koffi Melo \"Yovani\"    Relationship: Self    Best call back number: [phone]     What medication are you requesting: SOMETHING TO HELP SLEEP        Have you had these symptoms before:    [] Yes  [x] No    Have you been treated for these symptoms before:   [] Yes  [x] No    If a prescription is needed, what is your preferred pharmacy and phone number: [id number] - [place], [place] - [address] AT [facility] [address] [facility] [phone] [facility] [phone]      Additional notes:PATIENT SAW HAZEL YOUNG ON 5/30/25          "

## 2025-05-30 NOTE — PROGRESS NOTES
Patient Name: Koffi Melo  : 1960   MRN: [id number]     Chief Complaint:    Chief Complaint   Patient presents with    Depression    Anxiety       History of Present Illness: Koffi Melo is a 64 y.o. male.  History of Present Illness  The patient presents for evaluation of anxiety, depression, erectile dysfunction, and lower urinary tract symptoms.    Anxiety and Depression  - Significant decline in mental state following discontinuation of medications approximately 1.5 to 2 months ago  - Struggling with impulsivity, poor attention to detail, and memory issues  - No suicidal ideation or bipolar disorder  - Symptoms primarily anxiety-related with no specific triggers identified  - Difficulty sleeping and concentrating  - Feelings of depression and loss of interest in previously enjoyable activities  - Symptoms impacting daily life  - Describes job as stressful and feels symptoms are exacerbated by everyday life  - Previously on Wellbutrin and fluoxetine for depression and anxiety    Erectile Dysfunction  - Attributes erectile dysfunction to anxiety  - Previously tried Viagra but discontinued due to lack of sexual activity with wife  - Interested in trying Cialis    Lower Urinary Tract Symptoms  - Reports weak stream, frequent urination, and urgency  - Often leads to accidents if he does not immediately use the restroom  - Has not seen a urologist for these symptoms  - Consumes a large amount of soft drinks and minimal water intake    Supplemental information: He had been on these medications for over a year, prescribed by Dr. Zapata, and had experienced improvement but decided to stop them.       Subjective     Review of System: Review of Systems     Medications:     Current Outpatient Medications:     buPROPion XL (WELLBUTRIN XL) 150 MG 24 hr tablet, Take one tablet daily for 3 days; then increase to 2 tablets daily, Disp: 90 tablet, Rfl: 0    hydrOXYzine (ATARAX) 25 MG tablet,  "Take 1 tablet by mouth 3 (Three) Times a Day As Needed (sleep)., Disp: 30 tablet, Rfl: 0    tadalafil (Cialis) 20 MG tablet, Take one tablet daily as needed for ED, Disp: 30 tablet, Rfl: 0    Allergies:   Allergies   Allergen Reactions    Hydrocodone-Acetaminophen Itching    Oxycodone-Acetaminophen Itching       Objective     Physical Exam:   Vital Signs:   Vitals:    05/30/25 1328   BP: 134/86   BP Location: Right arm   Patient Position: Sitting   Cuff Size: Adult   Pulse: 86   Resp: 16   Temp: 98.4 °F (36.9 °C)   TempSrc: Infrared   Weight: 96.5 kg (212 lb 12.8 oz)   Height: 171.5 cm (67.5\")   PainSc: 0-No pain           Physical Exam  Physical Exam  General: Patient appears well, slightly anxious.  Respiratory: Clear to auscultation, no wheezing, rales or rhonchi  Cardiovascular: Regular rate and rhythm, no murmurs, rubs, or gallops       Results  Labs   - PSA: 2024, Normal     Assessment / Plan      Assessment/Plan:   Diagnoses and all orders for this visit:    1. Anxiety and depression (Primary)  -     buPROPion XL (WELLBUTRIN XL) 150 MG 24 hr tablet; Take one tablet daily for 3 days; then increase to 2 tablets daily  Dispense: 90 tablet; Refill: 0    2. Erectile dysfunction, unspecified erectile dysfunction type  -     tadalafil (Cialis) 20 MG tablet; Take one tablet daily as needed for ED  Dispense: 30 tablet; Refill: 0  -     Ambulatory Referral to Urology    3. Lower urinary tract symptoms  -     Ambulatory Referral to Urology  -     POC Urinalysis Dipstick, Automated; Future  -     POC Urinalysis Dipstick, Automated    4. Sleeping difficulty  -     hydrOXYzine (ATARAX) 25 MG tablet; Take 1 tablet by mouth 3 (Three) Times a Day As Needed (sleep).  Dispense: 30 tablet; Refill: 0       Assessment & Plan  1. Anxiety  - Reports increased depression and anxiety after discontinuing medications  - Start bupropion 150 mg once daily for 3 days, then increase to 300 mg daily if tolerated  - Medication to be taken in " the morning to avoid sleep interference  - Follow-up in 2 weeks via telehealth to assess response to treatment  - Notify clinic immediately if adverse effects such as headaches, mood worsening, or suicidal thoughts occur  - Additional medications may be considered if no improvement or symptoms worsen    2. Erectile dysfunction  - Reports difficulty maintaining an erection  - Previous use of Viagra noted  - Prescribe Cialis 20 mg to be taken as needed before sexual activity  - Advised to consult a urologist for further evaluation and management  - Follow-up in 2 weeks via telehealth to assess response to treatment    3. Lower urinary tract symptoms  - Reports weak urinary stream and frequent urination  - Physical exam reveals no acute distress  - Urinalysis to be conducted today to rule out infection  - Advised to increase water intake and reduce consumption of soft drinks  - Referral to a urologist for further evaluation  - Follow-up in 2 weeks via telehealth to assess response to treatment       Explained and discussed patient's condition and plan of care including labs and radiology that may be ordered.  Discussed when to follow-up.  Discussed possible red flags and how to follow-up with those.  Viewed patient's medications and discussed common side effects and symptoms to report. Patient to continue current medications as advised.  Be compliant with medications. Patient to call clinic if they have any worsening, no improvement, does not tolerate medication, or any future concerns about treatment.  Questions and concerns addressed at this appointment.  Patient to report to ER for emergencies.  Patient verbalized an understanding and agreement with plan of care.      Follow Up:   Return in about 2 weeks (around 6/13/2025) for  recheck telehealth  .  Patient or patient representative verbalized consent for the use of Ambient Listening during the visit with  JENNIFER Holguin for chart documentation. 6/1/2025   19:00 INDER Lentz, JENNIFER  [facility]

## 2025-06-01 ENCOUNTER — RESULTS FOLLOW-UP (OUTPATIENT)
Dept: INTERNAL MEDICINE | Facility: CLINIC | Age: 65
End: 2025-06-01
Payer: COMMERCIAL

## 2025-06-01 RX ORDER — HYDROXYZINE HYDROCHLORIDE 25 MG/1
25 TABLET, FILM COATED ORAL 3 TIMES DAILY PRN
Qty: 30 TABLET | Refills: 0 | Status: SHIPPED | OUTPATIENT
Start: 2025-06-01

## 2025-06-09 ENCOUNTER — OFFICE VISIT (OUTPATIENT)
Dept: INTERNAL MEDICINE | Facility: CLINIC | Age: 65
End: 2025-06-09
Payer: COMMERCIAL

## 2025-06-09 VITALS
RESPIRATION RATE: 16 BRPM | BODY MASS INDEX: 32.25 KG/M2 | WEIGHT: 209 LBS | DIASTOLIC BLOOD PRESSURE: 74 MMHG | SYSTOLIC BLOOD PRESSURE: 112 MMHG | HEART RATE: 68 BPM | TEMPERATURE: 96.9 F

## 2025-06-09 DIAGNOSIS — R39.9 LOWER URINARY TRACT SYMPTOMS: ICD-10-CM

## 2025-06-09 DIAGNOSIS — F32.A ANXIETY AND DEPRESSION: Primary | ICD-10-CM

## 2025-06-09 DIAGNOSIS — N52.9 ERECTILE DYSFUNCTION, UNSPECIFIED ERECTILE DYSFUNCTION TYPE: ICD-10-CM

## 2025-06-09 DIAGNOSIS — G47.9 SLEEPING DIFFICULTY: ICD-10-CM

## 2025-06-09 DIAGNOSIS — F41.9 ANXIETY AND DEPRESSION: Primary | ICD-10-CM

## 2025-06-09 PROCEDURE — 99214 OFFICE O/P EST MOD 30 MIN: CPT | Performed by: INTERNAL MEDICINE

## 2025-06-09 RX ORDER — BUPROPION HYDROCHLORIDE 300 MG/1
300 TABLET ORAL DAILY
Qty: 90 TABLET | Refills: 2 | Status: SHIPPED | OUTPATIENT
Start: 2025-06-09
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
## 2025-06-20 ENCOUNTER — OFFICE VISIT (OUTPATIENT)
Dept: UROLOGY | Facility: CLINIC | Age: 65
End: 2025-06-20
Payer: COMMERCIAL

## 2025-06-20 ENCOUNTER — LAB (OUTPATIENT)
Dept: LAB | Facility: HOSPITAL | Age: 65
End: 2025-06-20
Payer: COMMERCIAL

## 2025-06-20 VITALS — HEIGHT: 68 IN | WEIGHT: 209 LBS | BODY MASS INDEX: 31.67 KG/M2

## 2025-06-20 DIAGNOSIS — N40.1 BPH WITH LOWER URINARY TRACT SYMPTOMS WITHOUT URINARY OBSTRUCTION: Primary | ICD-10-CM

## 2025-06-20 DIAGNOSIS — N52.1 ERECTILE DYSFUNCTION DUE TO DISEASES CLASSIFIED ELSEWHERE: ICD-10-CM

## 2025-06-20 LAB
ALBUMIN SERPL-MCNC: 4.3 G/DL (ref 3.5–5.2)
ALBUMIN/GLOB SERPL: 1.7 G/DL
ALP SERPL-CCNC: 79 U/L (ref 39–117)
ALT SERPL W P-5'-P-CCNC: 18 U/L (ref 1–41)
ANION GAP SERPL CALCULATED.3IONS-SCNC: 9.2 MMOL/L (ref 5–15)
AST SERPL-CCNC: 24 U/L (ref 1–40)
BILIRUB SERPL-MCNC: 0.3 MG/DL (ref 0–1.2)
BUN SERPL-MCNC: 13 MG/DL (ref 8–23)
BUN/CREAT SERPL: 9.3 (ref 7–25)
CALCIUM SPEC-SCNC: 9.3 MG/DL (ref 8.6–10.5)
CHLORIDE SERPL-SCNC: 104 MMOL/L (ref 98–107)
CO2 SERPL-SCNC: 26.8 MMOL/L (ref 22–29)
CREAT SERPL-MCNC: 1.4 MG/DL (ref 0.76–1.27)
EGFRCR SERPLBLD CKD-EPI 2021: 56.8 ML/MIN/1.73
ESTRADIOL SERPL HS-MCNC: 32.7 PG/ML
GLOBULIN UR ELPH-MCNC: 2.6 GM/DL
GLUCOSE SERPL-MCNC: 95 MG/DL (ref 65–99)
POTASSIUM SERPL-SCNC: 4.5 MMOL/L (ref 3.5–5.2)
PROT SERPL-MCNC: 6.9 G/DL (ref 6–8.5)
SODIUM SERPL-SCNC: 140 MMOL/L (ref 136–145)
TESTOST SERPL-MCNC: 505 NG/DL (ref 193–740)

## 2025-06-20 PROCEDURE — 84154 ASSAY OF PSA FREE: CPT | Performed by: NURSE PRACTITIONER

## 2025-06-20 PROCEDURE — 36415 COLL VENOUS BLD VENIPUNCTURE: CPT | Performed by: NURSE PRACTITIONER

## 2025-06-20 PROCEDURE — 82670 ASSAY OF TOTAL ESTRADIOL: CPT | Performed by: NURSE PRACTITIONER

## 2025-06-20 PROCEDURE — 84153 ASSAY OF PSA TOTAL: CPT | Performed by: NURSE PRACTITIONER

## 2025-06-20 PROCEDURE — 84403 ASSAY OF TOTAL TESTOSTERONE: CPT | Performed by: NURSE PRACTITIONER

## 2025-06-20 PROCEDURE — 80053 COMPREHEN METABOLIC PANEL: CPT | Performed by: NURSE PRACTITIONER

## 2025-06-20 RX ORDER — TAMSULOSIN HYDROCHLORIDE 0.4 MG/1
1 CAPSULE ORAL DAILY
Qty: 90 CAPSULE | Refills: 3 | Status: SHIPPED | OUTPATIENT
Start: 2025-06-20 | End: 2025-09-18

## 2025-06-20 NOTE — LETTER
June 27, 2025     JENNIFER Holguin  [address]  [address]  [facility] [address]    Patient: Koffi Melo   YOB: 1960   Date of Visit: 6/20/2025       Dear JENNIFER Holguin,    Thank you for referring Koffi Melo to me for evaluation. Below is a copy of my consult note.    If you have questions, please do not hesitate to call me. I look forward to following Koffi along with you.         Sincerely,        Griselda Cheng-Akwa, APRN        CC: No Recipients    Chief Complaint  Erectile Dysfunction and BPH WITH LUTS    Subjective         History of Present Illness:  Koffi Melo is a 64 y.o. male with a past medical history of anxiety, CVA, PFO, microangiopathy, bursitis, who presents for evaluation for Erectile dysfunction, BPH and lower urinary tract symptoms.His most recent PSA is 1.6 on 09/11/2024, His PSA prior was 2.030  two years prior.    HIS LUTS symptoms include :Urinary frequency every 1-2 hours, nocturia 2-3 times, and urgency.  He does report a weak urinary stream, with intermittency, hesitancy, occasional straining to urinate.  He reports constant feeling of incomplete bladder emptying, with intermittent postvoid dribbling. He does not have recurrent UTIs, denies dysuria or any burning on urination.  Denies pelvic pressure or supra pubic discomfort.  Denies back pain, abdominal pain, flank pain and also denies CVA tenderness. No N/V/D. Denies chills or fevers.  His urine dipstick today is completely negative for any infection, it is negative for gross/microscopic hematuria. His IPSS score is 18, his PVR>0cc.     He also Presents to clinic today with concerns of Erectile dysfunction.  Patient reports recent inability to achieve and maintain an erection, and even when he is able to get an erection he reports early detumescence.He was prescribed Cialis 20 mg by his PCP to use as needed for ED but has not tried medications yet.  He  "reports decreased interest and sexual activity.  Also report nonsexual symptomatology such fatigue, difficulty completing a job, tiredness.  He is unsure about his testosterone levels and would like to get it checked.    Objective  Vital Signs:   Ht 171.5 cm (67.5\") Comment: PT STATED  Wt 94.8 kg (209 lb)   BMI 32.25 kg/m²       ROS:   Review of Systems   Constitutional:  Positive for activity change and fatigue. Negative for appetite change, chills and fever.   HENT:  Negative for congestion and sinus pressure.    Eyes:  Negative for blurred vision and double vision.   Respiratory:  Negative for shortness of breath and wheezing.    Gastrointestinal:  Negative for abdominal pain, constipation, diarrhea, nausea and vomiting.   Genitourinary:  Positive for frequency, nocturia, erectile dysfunction and urgency. Negative for difficulty urinating, discharge, dysuria, flank pain, genital sores, hematuria, penile pain, penile swelling, scrotal swelling and testicular pain.   Musculoskeletal:  Negative for back pain.   Neurological:  Negative for dizziness, weakness and confusion.   Psychiatric/Behavioral:  Negative for agitation, behavioral problems and decreased concentration.         Physical Exam  Constitutional:       General: He is not in acute distress.     Appearance: He is well-developed.   HENT:      Head: Normocephalic and atraumatic.      Right Ear: External ear normal.      Left Ear: External ear normal.   Eyes:      General:         Right eye: No discharge.         Left eye: No discharge.      Conjunctiva/sclera: Conjunctivae normal.      Pupils: Pupils are equal, round, and reactive to light.   Neck:      Thyroid: No thyromegaly.      Trachea: No tracheal deviation.   Cardiovascular:      Rate and Rhythm: Normal rate and regular rhythm.      Heart sounds: No murmur heard.     No friction rub.   Pulmonary:      Effort: Pulmonary effort is normal. No respiratory distress.      Breath sounds: Normal breath " sounds. No stridor.   Abdominal:      General: Bowel sounds are normal. There is no distension.      Palpations: Abdomen is soft.      Tenderness: There is no abdominal tenderness. There is no guarding.   Genitourinary:     Penis: Normal and uncircumcised. No tenderness or discharge.       Testes: Normal.      Rectum: Normal. Guaiac result negative.      Comments: Deferred JULIANA.  Reports frequency, urgency, nocturia, hesitancy, intermittency, weak stream, with incomplete bladder emptying  Musculoskeletal:         General: No tenderness or deformity. Normal range of motion.      Cervical back: Normal range of motion and neck supple.   Skin:     General: Skin is warm and dry.      Capillary Refill: Capillary refill takes less than 2 seconds.      Coloration: Skin is not pale.   Neurological:      Mental Status: He is alert and oriented to person, place, and time.      Cranial Nerves: No cranial nerve deficit.      Coordination: Coordination normal.   Psychiatric:         Behavior: Behavior normal.         Thought Content: Thought content normal.         Judgment: Judgment normal.          Result Review:     Urinalysis, CBC, CMP, imaging.    RADIOLOGY (CT AND/OR KUB):    CT Abdomen and Pelvis: Results for orders placed during the hospital encounter of 09/15/17    CT Abdomen Pelvis With & Without Contrast    Narrative  EXAMINATION: CT ABDOMEN PELVIS W WO CONTRAST- 09/15/2017    INDICATION: K57.32-Diverticulitis of large intestine without perforation  or abscess without bleeding    TECHNIQUE: CT scan of the abdomen and pelvis was performed with and  without intravenous contrast.    The radiation dose reduction device was turned on for each scan per the  ALARA (As Low as Reasonably Achievable) protocol.    COMPARISON: 04/10/2017    FINDINGS: The most superior images demonstrate no basilar pulmonary  inflammatory process or pleural effusion. The liver and spleen are  normal. There is no adrenal or pancreatic mass. There are  bilateral  parapelvic renal cysts. There is no renal mass, stone or obstruction.  There is no ascites, aneurysm or retroperitoneal lymphadenopathy. There  are sigmoid diverticuli without diverticulitis. There is no perforation  or abscess. There is no pelvic or inguinal lymphadenopathy. The appendix  is normal.    Impression  Sigmoid diverticulosis without diverticulitis, perforation  or abscess.    D:  09/15/2017  E:  09/15/2017      This report was finalized on 9/15/2017 3:16 PM by Dr. Ismael Rhodes MD.     CT Stone Protocol: Results for orders placed during the hospital encounter of 03/10/17    CT Abdomen Pelvis Stone Protocol    Narrative  EXAMINATION: CT ABDOMEN PELVIS STONE PROTOCOL- 03/10/2017    INDICATION: N20.0-Calculus of kidney; R10.9-Unspecified abdominal pain ,  left flank pain for 5 days    TECHNIQUE:    CT data set of the abdomen and pelvis was performed without intravenous  contrast.    The radiation dose reduction device was turned on for each scan per the  ALARA (As Low as Reasonably Achievable) protocol.    COMPARISON: Compared to distant examinations, CT datasets of the abdomen  and pelvis, 07/21/2013.    FINDINGS:  1. There is a suggestion of bilateral hydronephrosis with  normal-appearing ureters which suggest ureteropelvic junction  obstructions. These may not be high-grade but may become symptomatic  with diuresis.    There is a small nonobstructing 2 mm stone in the lower pole of the  right kidney but there are no definite detectable stones in the left  kidney. Ureteral stones or ureteral obstruction is not identified and  the bladder is negative for abnormal calcifications.    Although parapelvic cysts can mask hydronephrosis and ureteropelvic  junction obstruction, patient appears to have dilated renal collecting  systems with pyelocaliectasis, worse on the left than right.  Contrast-enhanced datasets would better delineate the nature of the  collecting systems. Also, for diagnostic  testing, a radionuclide  examination of the kidneys with a Lasix challenge can grade the degree  and significance of ureteropelvic junction obstruction. Some patients  only have symptomatology during diuretic phases.    2. Renal parenchymal lesion is not identified and there is no evidence  of parenchymal atrophy of the kidneys.  3. The liver, spleen, pancreas, and adrenal glands are within normal  limits. There is no focal hepatic lesion and there are no abnormal  radiodensities seen within the gallbladder.  4. Retrocrural space, retroperitoneum, mid abdomen and lower abdomen are  unremarkable and the pelvis is negative. The patient has diverticulosis  without evidence of acute diverticulitis or inflammatory bowel disease.  Appendix is normal.    Impression  1. Evidence suggesting pyelocaliectasis of both renal collecting systems  with hydronephrotic distention of the renal collecting systems, slightly  worse on the left than right in the presence of normal size and normal  appearance to the ureters which suggests possible ureteropelvic junction  obstruction. The degree of obstruction is certainly not high-grade. The  patient has normal parenchymal complement and there is no evidence of  back pressure atrophy. On the other hand, however, compared to previous  studies of 2013, the dilated collecting systems appear to be slightly  more prominent bilaterally. The left renal collecting system is more  dilated than that seen on the right. Otherwise, obstructing stone is not  appreciated either at the ureteropelvic junction or in the ureters on  either side.    See above comment regarding further investigative study and diagnostic  testing.    2. No other acute focal abnormality is seen in the abdomen or pelvis.    D:  03/10/2017  E:  03/10/2017      This report was finalized on 3/10/2017 2:00 PM by Dr. Martinez Shaffer MD.     KUB: Results for orders placed during the hospital encounter of 11/01/16    XR Abdomen  KUB    Narrative  EXAMINATION: XR ABDOMEN KUB- 11/01/2016    INDICATION: R10.30-Lower abdominal pain, unspecified    COMPARISON: NONE    FINDINGS:  1. Heterotopic calcification is seen superior and leftward of the left  hip which could be traumatic or degenerative.  2. The bone structures of the pelvis and hips are otherwise intact.  There is mild narrowing of the medial joint compartments of the femoral  acetabular joints.  3. Retained feces is seen diffusely in the colon indicating  constipation, worse in the transverse and  ascending colon.  4. Otherwise, mass, transition zone or other acute abnormality is not  identified.    Impression  1. Marked retained feces in the colon.  2. Heterotopic calcification lateral to the left femoral acetabular  joint.  3. No other acute focal abnormality identified in the abdomen.    D:  11/01/2016  E:  11/01/2016    This report was finalized on 11/1/2016 4:38 PM by Dr. Martinez Shaffer MD.       LABS (3 MONTHS):    Office Visit on 06/20/2025   Component Date Value Ref Range Status   • Testosterone, Total 06/20/2025 505.00  193.00 - 740.00 ng/dL Final   • Estradiol 06/20/2025 32.7  pg/mL Final   • Glucose 06/20/2025 95  65 - 99 mg/dL Final   • BUN 06/20/2025 13.0  8.0 - 23.0 mg/dL Final   • Creatinine 06/20/2025 1.40 (H)  0.76 - 1.27 mg/dL Final   • Sodium 06/20/2025 140  136 - 145 mmol/L Final   • Potassium 06/20/2025 4.5  3.5 - 5.2 mmol/L Final   • Chloride 06/20/2025 104  98 - 107 mmol/L Final   • CO2 06/20/2025 26.8  22.0 - 29.0 mmol/L Final   • Calcium 06/20/2025 9.3  8.6 - 10.5 mg/dL Final   • Total Protein 06/20/2025 6.9  6.0 - 8.5 g/dL Final   • Albumin 06/20/2025 4.3  3.5 - 5.2 g/dL Final   • ALT (SGPT) 06/20/2025 18  1 - 41 U/L Final   • AST (SGOT) 06/20/2025 24  1 - 40 U/L Final   • Alkaline Phosphatase 06/20/2025 79  39 - 117 U/L Final   • Total Bilirubin 06/20/2025 0.3  0.0 - 1.2 mg/dL Final   • Globulin 06/20/2025 2.6  gm/dL Final   • A/G Ratio 06/20/2025 1.7  g/dL  Final   • BUN/Creatinine Ratio 06/20/2025 9.3  7.0 - 25.0 Final   • Anion Gap 06/20/2025 9.2  5.0 - 15.0 mmol/L Final   • eGFR 06/20/2025 56.8 (L)  >60.0 mL/min/1.73 Final   • PSA 06/20/2025 3.6  0.0 - 4.0 ng/mL Final    Roche ECLIA methodology.  According to the American Urological Association, Serum PSA should  decrease and remain at undetectable levels after radical  prostatectomy. The AUA defines biochemical recurrence as an initial  PSA value 0.2 ng/mL or greater followed by a subsequent confirmatory  PSA value 0.2 ng/mL or greater.  Values obtained with different assay methods or kits cannot be used  interchangeably. Results cannot be interpreted as absolute evidence  of the presence or absence of malignant disease.   • PSA, Free 06/20/2025 0.58  N/A ng/mL Final    Roche ECLIA methodology.   • PSA, Free % 06/20/2025 16.1  % Final    The table below lists the probability of prostate cancer for  men with non-suspicious JULIANA results and total PSA between  4 and 10 ng/mL, by patient age (Nolvia et al, AMAIRANI 1998,  279:1542).                    % Free PSA       50-64 yr        65-75 yr                    0.00-10.00%        56%             55%                   10.01-15.00%        24%             35%                   15.01-20.00%        17%             23%                   20.01-25.00%        10%             20%                        >25.00%         5%              9%  Please note:  Nolvia et al did not make specific                recommendations regarding the use of                percent free PSA for any other population                of men.   Office Visit on 05/30/2025   Component Date Value Ref Range Status   • Color 05/30/2025 Yellow  Yellow, Straw, Dark Yellow, Diana Final   • Clarity, UA 05/30/2025 Clear  Clear Final   • Specific Gravity  05/30/2025 1.020  1.005 - 1.030 Final   • pH, Urine 05/30/2025 5.0  5.0 - 8.0 Final   • Leukocytes 05/30/2025 Negative  Negative Final   • Nitrite, UA 05/30/2025  Negative  Negative Final   • Protein, POC 05/30/2025 Trace (A)  Negative mg/dL Final   • Glucose, UA 05/30/2025 Negative  Negative mg/dL Final   • Ketones, UA 05/30/2025 Negative  Negative Final   • Urobilinogen, UA 05/30/2025 Normal  Normal, 0.2 E.U./dL Final   • Bilirubin 05/30/2025 Negative  Negative Final   • Blood, UA 05/30/2025 Negative  Negative Final   • Lot Number 05/30/2025 [id number]   Final   • Expiration Date 05/30/2025 01/31/2026   Final   Office Visit on 05/09/2025   Component Date Value Ref Range Status   • SARS Antigen 05/09/2025 Detected (A)  Not Detected, Presumptive Negative Final   • Influenza A Antigen ADAN 05/09/2025 Not Detected  Not Detected Final   • Influenza B Antigen ADNA 05/09/2025 Not Detected  Not Detected Final   • Internal Control 05/09/2025 Passed  Passed Final   • Lot Number 05/09/2025 [id number]   Final   • Expiration Date 05/09/2025 02/07/2026   Final   • Rapid Strep A Screen 05/09/2025 Negative  Negative, VALID, INVALID, Not Performed Final   • Internal Control 05/09/2025 Passed  Passed Final   • Lot Number 05/09/2025 [id number]   Final   • Expiration Date 05/09/2025 04/24/2026   Final        IPSS Questionnaire (AUA-7):  Over the past month…    1)  How often have you had a sensation of not emptying your bladder completely after you finish urinating?  0 - Not at all   2)  How often have you had to urinate again less than two hours after you finished urinating? 3 - About half the time   3)  How often have you found you stopped and started again several times when you urinated?  3 - About half the time   4) How difficult have you found it to postpone urination?  5 - Almost always   5) How often have you had a weak urinary stream?  3 - About half the time   6) How often have you had to push or strain to begin urination?  2 - Less than half the time   7) How many times did you most typically get up to urinate from the time you went to bed until the time you got up in the morning?  2 - 2  times   Total Score:  18       Assessment and Plan    Assessment & Plan  BPH with lower urinary tract symptoms without urinary obstruction  Mr Koffi Melo is a 64 y.o. male who presents for evaluation for Erectile dysfunction, BPH and lower urinary tract symptoms.He denies any groin trauma,   His urine dipstick today is completely negative for any infection, it is negative for gross/microscopic hematuria. His IPSS score is 18, his PVR>0cc. PSA is 1.6    EDUCATION:BPH: FIRST, WE Discussed the pathophysiology of BPH and obstruction. We discussed the static and dynamic effects of BPH as well as using 5 alpha reductase inhibitors versus alpha blockade.  We discussed the indications for transurethral surgery as well.  And/ or other therapeutic options available including all of the newer techniques.  He has no real symptomatology referable to his prostate other than moderate enlargement.  Rather than proceed with an expensive workup he doesn't need, at this time I am recommending an alpha blocker tamsulosin 0.4 mg capsule daily, VS CIALIS and alpha reductase inhibitor-finasteride 5 mg daily If indicated later.WE Discussed maximal medical therapy with finasteride and tamsulosin and how each medication works I explained that finasteride would reduce the size of the prostate by 20-30% reduce his PSA by 50% reduce the risks of either surgery or urinary retention by 50% and may reduce the risk of prostate cancer well-differentiated by 20-30% however he may increase the risk of poorly differentiated prostate cancer by half to 1% and that I also explained how on Flomax relaxes the prostate but can often cause retrograde ejaculation .    Erectile Dysfunction: Next, We discussed the anatomy and physiology of the penis and the endothelium.  We discussed the various forms of erectile dysfunction including peripheral vascular occlusive disease, Postoperative, secondary to radiation treatments of the prostate, and  arterial inflow. We discussed the various treatment options available including oral medication and its various forms.  We discussed the use of both generic and non-generic Viagra.  We discussed Cialis and a longer half-life of 17 hours as well as the other 2 medications.  We discussed cost involved with this including the fact that the generic is much cheaper but is taken has multiple pills because they are 20 mg dosages.  We did discuss the other alternatives including Penile injections, vacuum erection devices and surgical intervention reserved for only the most severe cases.  We discussed the need for testosterone in about 20% of cases of erectile dysfunction.        Plan   We Checked his Labs: Testosterone,Estradial, PSA, CBC    Will start Tamsulosin 0.4 mg daily    Encouraged to start Cialis 20 mg PRN as Prescribed    Follow up in Clinic in 4 Weeks for Re-evaluation    May return sooner if symptoms worsen    Patient is Agreeable with Plan of care    Orders:  •  tamsulosin (FLOMAX) 0.4 MG capsule 24 hr capsule; Take 1 capsule by mouth Daily for 90 days.  •  Testosterone  •  Estradiol  •  Comprehensive Metabolic Panel  •  PSA, Total & Free    Erectile dysfunction due to diseases classified elsewhere    Orders:  •  tamsulosin (FLOMAX) 0.4 MG capsule 24 hr capsule; Take 1 capsule by mouth Daily for 90 days.  •  Testosterone  •  Estradiol  •  Comprehensive Metabolic Panel  •  PSA, Total & Free      Patient reports that he is not currently experiencing any symptoms of urinary incontinence.    Smoking Cessation Counseling:  Never a smoker.  Patient does not currently use any tobacco products.       Follow Up   Return in about 1 month (around 7/20/2025) for Next scheduled follow up, FOR  BPH W LUTS/ED/PSA/MED REFILLS/LABS REVIEW/EVAL MEDS.    Patient was given instructions and counseling regarding his condition or for health maintenance advice. Please see specific information pulled into the AVS if appropriate.           This document has been electronically signed by Griselda Cheng-Akwa, APRN   June 27, 2025 20:22 EDT

## 2025-06-20 NOTE — PROGRESS NOTES
"Chief Complaint  Erectile Dysfunction and BPH WITH LUTS    Subjective          History of Present Illness:  Koffi Melo is a 64 y.o. male with a past medical history of anxiety, CVA, PFO, microangiopathy, bursitis, who presents for evaluation for Erectile dysfunction, BPH and lower urinary tract symptoms.His most recent PSA is 1.6 on 09/11/2024, His PSA prior was 2.030  two years prior.    HIS LUTS symptoms include :Urinary frequency every 1-2 hours, nocturia 2-3 times, and urgency.  He does report a weak urinary stream, with intermittency, hesitancy, occasional straining to urinate.  He reports constant feeling of incomplete bladder emptying, with intermittent postvoid dribbling. He does not have recurrent UTIs, denies dysuria or any burning on urination.  Denies pelvic pressure or supra pubic discomfort.  Denies back pain, abdominal pain, flank pain and also denies CVA tenderness. No N/V/D. Denies chills or fevers.  His urine dipstick today is completely negative for any infection, it is negative for gross/microscopic hematuria. His IPSS score is 18, his PVR>0cc.     He also Presents to clinic today with concerns of Erectile dysfunction.  Patient reports recent inability to achieve and maintain an erection, and even when he is able to get an erection he reports early detumescence.He was prescribed Cialis 20 mg by his PCP to use as needed for ED but has not tried medications yet.  He reports decreased interest and sexual activity.  Also report nonsexual symptomatology such fatigue, difficulty completing a job, tiredness.  He is unsure about his testosterone levels and would like to get it checked.    Objective   Vital Signs:   Ht 171.5 cm (67.5\") Comment: PT STATED  Wt 94.8 kg (209 lb)   BMI 32.25 kg/m²       ROS:   Review of Systems   Constitutional:  Positive for activity change and fatigue. Negative for appetite change, chills and fever.   HENT:  Negative for congestion and sinus pressure.  "   Eyes:  Negative for blurred vision and double vision.   Respiratory:  Negative for shortness of breath and wheezing.    Gastrointestinal:  Negative for abdominal pain, constipation, diarrhea, nausea and vomiting.   Genitourinary:  Positive for frequency, nocturia, erectile dysfunction and urgency. Negative for difficulty urinating, discharge, dysuria, flank pain, genital sores, hematuria, penile pain, penile swelling, scrotal swelling and testicular pain.   Musculoskeletal:  Negative for back pain.   Neurological:  Negative for dizziness, weakness and confusion.   Psychiatric/Behavioral:  Negative for agitation, behavioral problems and decreased concentration.         Physical Exam  Constitutional:       General: He is not in acute distress.     Appearance: He is well-developed.   HENT:      Head: Normocephalic and atraumatic.      Right Ear: External ear normal.      Left Ear: External ear normal.   Eyes:      General:         Right eye: No discharge.         Left eye: No discharge.      Conjunctiva/sclera: Conjunctivae normal.      Pupils: Pupils are equal, round, and reactive to light.   Neck:      Thyroid: No thyromegaly.      Trachea: No tracheal deviation.   Cardiovascular:      Rate and Rhythm: Normal rate and regular rhythm.      Heart sounds: No murmur heard.     No friction rub.   Pulmonary:      Effort: Pulmonary effort is normal. No respiratory distress.      Breath sounds: Normal breath sounds. No stridor.   Abdominal:      General: Bowel sounds are normal. There is no distension.      Palpations: Abdomen is soft.      Tenderness: There is no abdominal tenderness. There is no guarding.   Genitourinary:     Penis: Normal and uncircumcised. No tenderness or discharge.       Testes: Normal.      Rectum: Normal. Guaiac result negative.      Comments: Deferred JULIANA.  Reports frequency, urgency, nocturia, hesitancy, intermittency, weak stream, with incomplete bladder emptying  Musculoskeletal:          General: No tenderness or deformity. Normal range of motion.      Cervical back: Normal range of motion and neck supple.   Skin:     General: Skin is warm and dry.      Capillary Refill: Capillary refill takes less than 2 seconds.      Coloration: Skin is not pale.   Neurological:      Mental Status: He is alert and oriented to person, place, and time.      Cranial Nerves: No cranial nerve deficit.      Coordination: Coordination normal.   Psychiatric:         Behavior: Behavior normal.         Thought Content: Thought content normal.         Judgment: Judgment normal.          Result Review :     Urinalysis, CBC, CMP, imaging.    RADIOLOGY (CT AND/OR KUB):    CT Abdomen and Pelvis: Results for orders placed during the hospital encounter of 09/15/17    CT Abdomen Pelvis With & Without Contrast    Narrative  EXAMINATION: CT ABDOMEN PELVIS W WO CONTRAST- 09/15/2017    INDICATION: K57.32-Diverticulitis of large intestine without perforation  or abscess without bleeding    TECHNIQUE: CT scan of the abdomen and pelvis was performed with and  without intravenous contrast.    The radiation dose reduction device was turned on for each scan per the  ALARA (As Low as Reasonably Achievable) protocol.    COMPARISON: 04/10/2017    FINDINGS: The most superior images demonstrate no basilar pulmonary  inflammatory process or pleural effusion. The liver and spleen are  normal. There is no adrenal or pancreatic mass. There are bilateral  parapelvic renal cysts. There is no renal mass, stone or obstruction.  There is no ascites, aneurysm or retroperitoneal lymphadenopathy. There  are sigmoid diverticuli without diverticulitis. There is no perforation  or abscess. There is no pelvic or inguinal lymphadenopathy. The appendix  is normal.    Impression  Sigmoid diverticulosis without diverticulitis, perforation  or abscess.    D:  09/15/2017  E:  09/15/2017      This report was finalized on 9/15/2017 3:16 PM by Dr. Ismael Rhodes MD.     CT  Stone Protocol: Results for orders placed during the hospital encounter of 03/10/17    CT Abdomen Pelvis Stone Protocol    Narrative  EXAMINATION: CT ABDOMEN PELVIS STONE PROTOCOL- 03/10/2017    INDICATION: N20.0-Calculus of kidney; R10.9-Unspecified abdominal pain ,  left flank pain for 5 days    TECHNIQUE:    CT data set of the abdomen and pelvis was performed without intravenous  contrast.    The radiation dose reduction device was turned on for each scan per the  ALARA (As Low as Reasonably Achievable) protocol.    COMPARISON: Compared to distant examinations, CT datasets of the abdomen  and pelvis, 07/21/2013.    FINDINGS:  1. There is a suggestion of bilateral hydronephrosis with  normal-appearing ureters which suggest ureteropelvic junction  obstructions. These may not be high-grade but may become symptomatic  with diuresis.    There is a small nonobstructing 2 mm stone in the lower pole of the  right kidney but there are no definite detectable stones in the left  kidney. Ureteral stones or ureteral obstruction is not identified and  the bladder is negative for abnormal calcifications.    Although parapelvic cysts can mask hydronephrosis and ureteropelvic  junction obstruction, patient appears to have dilated renal collecting  systems with pyelocaliectasis, worse on the left than right.  Contrast-enhanced datasets would better delineate the nature of the  collecting systems. Also, for diagnostic testing, a radionuclide  examination of the kidneys with a Lasix challenge can grade the degree  and significance of ureteropelvic junction obstruction. Some patients  only have symptomatology during diuretic phases.    2. Renal parenchymal lesion is not identified and there is no evidence  of parenchymal atrophy of the kidneys.  3. The liver, spleen, pancreas, and adrenal glands are within normal  limits. There is no focal hepatic lesion and there are no abnormal  radiodensities seen within the gallbladder.  4.  Retrocrural space, retroperitoneum, mid abdomen and lower abdomen are  unremarkable and the pelvis is negative. The patient has diverticulosis  without evidence of acute diverticulitis or inflammatory bowel disease.  Appendix is normal.    Impression  1. Evidence suggesting pyelocaliectasis of both renal collecting systems  with hydronephrotic distention of the renal collecting systems, slightly  worse on the left than right in the presence of normal size and normal  appearance to the ureters which suggests possible ureteropelvic junction  obstruction. The degree of obstruction is certainly not high-grade. The  patient has normal parenchymal complement and there is no evidence of  back pressure atrophy. On the other hand, however, compared to previous  studies of 2013, the dilated collecting systems appear to be slightly  more prominent bilaterally. The left renal collecting system is more  dilated than that seen on the right. Otherwise, obstructing stone is not  appreciated either at the ureteropelvic junction or in the ureters on  either side.    See above comment regarding further investigative study and diagnostic  testing.    2. No other acute focal abnormality is seen in the abdomen or pelvis.    D:  03/10/2017  E:  03/10/2017      This report was finalized on 3/10/2017 2:00 PM by Dr. Martinez Shaffer MD.     KUB: Results for orders placed during the hospital encounter of 11/01/16    XR Abdomen KUB    Narrative  EXAMINATION: XR ABDOMEN KUB- 11/01/2016    INDICATION: R10.30-Lower abdominal pain, unspecified    COMPARISON: NONE    FINDINGS:  1. Heterotopic calcification is seen superior and leftward of the left  hip which could be traumatic or degenerative.  2. The bone structures of the pelvis and hips are otherwise intact.  There is mild narrowing of the medial joint compartments of the femoral  acetabular joints.  3. Retained feces is seen diffusely in the colon indicating  constipation, worse in the transverse  and  ascending colon.  4. Otherwise, mass, transition zone or other acute abnormality is not  identified.    Impression  1. Marked retained feces in the colon.  2. Heterotopic calcification lateral to the left femoral acetabular  joint.  3. No other acute focal abnormality identified in the abdomen.    D:  11/01/2016  E:  11/01/2016    This report was finalized on 11/1/2016 4:38 PM by Dr. Martinez Shaffer MD.       LABS (3 MONTHS):    Office Visit on 06/20/2025   Component Date Value Ref Range Status    Testosterone, Total 06/20/2025 505.00  193.00 - 740.00 ng/dL Final    Estradiol 06/20/2025 32.7  pg/mL Final    Glucose 06/20/2025 95  65 - 99 mg/dL Final    BUN 06/20/2025 13.0  8.0 - 23.0 mg/dL Final    Creatinine 06/20/2025 1.40 (H)  0.76 - 1.27 mg/dL Final    Sodium 06/20/2025 140  136 - 145 mmol/L Final    Potassium 06/20/2025 4.5  3.5 - 5.2 mmol/L Final    Chloride 06/20/2025 104  98 - 107 mmol/L Final    CO2 06/20/2025 26.8  22.0 - 29.0 mmol/L Final    Calcium 06/20/2025 9.3  8.6 - 10.5 mg/dL Final    Total Protein 06/20/2025 6.9  6.0 - 8.5 g/dL Final    Albumin 06/20/2025 4.3  3.5 - 5.2 g/dL Final    ALT (SGPT) 06/20/2025 18  1 - 41 U/L Final    AST (SGOT) 06/20/2025 24  1 - 40 U/L Final    Alkaline Phosphatase 06/20/2025 79  39 - 117 U/L Final    Total Bilirubin 06/20/2025 0.3  0.0 - 1.2 mg/dL Final    Globulin 06/20/2025 2.6  gm/dL Final    A/G Ratio 06/20/2025 1.7  g/dL Final    BUN/Creatinine Ratio 06/20/2025 9.3  7.0 - 25.0 Final    Anion Gap 06/20/2025 9.2  5.0 - 15.0 mmol/L Final    eGFR 06/20/2025 56.8 (L)  >60.0 mL/min/1.73 Final    PSA 06/20/2025 3.6  0.0 - 4.0 ng/mL Final    Roche ECLIA methodology.  According to the American Urological Association, Serum PSA should  decrease and remain at undetectable levels after radical  prostatectomy. The AUA defines biochemical recurrence as an initial  PSA value 0.2 ng/mL or greater followed by a subsequent confirmatory  PSA value 0.2 ng/mL or greater.  Values  obtained with different assay methods or kits cannot be used  interchangeably. Results cannot be interpreted as absolute evidence  of the presence or absence of malignant disease.    PSA, Free 06/20/2025 0.58  N/A ng/mL Final    Roche ECLIA methodology.    PSA, Free % 06/20/2025 16.1  % Final    The table below lists the probability of prostate cancer for  men with non-suspicious JULIANA results and total PSA between  4 and 10 ng/mL, by patient age (Nolvia et al, AMAIRANI 1998,  279:1542).                    % Free PSA       50-64 yr        65-75 yr                    0.00-10.00%        56%             55%                   10.01-15.00%        24%             35%                   15.01-20.00%        17%             23%                   20.01-25.00%        10%             20%                        >25.00%         5%              9%  Please note:  Nolvia et al did not make specific                recommendations regarding the use of                percent free PSA for any other population                of men.   Office Visit on 05/30/2025   Component Date Value Ref Range Status    Color 05/30/2025 Yellow  Yellow, Straw, Dark Yellow, Diana Final    Clarity, UA 05/30/2025 Clear  Clear Final    Specific Gravity  05/30/2025 1.020  1.005 - 1.030 Final    pH, Urine 05/30/2025 5.0  5.0 - 8.0 Final    Leukocytes 05/30/2025 Negative  Negative Final    Nitrite, UA 05/30/2025 Negative  Negative Final    Protein, POC 05/30/2025 Trace (A)  Negative mg/dL Final    Glucose, UA 05/30/2025 Negative  Negative mg/dL Final    Ketones, UA 05/30/2025 Negative  Negative Final    Urobilinogen, UA 05/30/2025 Normal  Normal, 0.2 E.U./dL Final    Bilirubin 05/30/2025 Negative  Negative Final    Blood, UA 05/30/2025 Negative  Negative Final    Lot Number 05/30/2025 [id number]   Final    Expiration Date 05/30/2025 01/31/2026   Final   Office Visit on 05/09/2025   Component Date Value Ref Range Status    SARS Antigen 05/09/2025 Detected (A)  Not  Detected, Presumptive Negative Final    Influenza A Antigen ADAN 05/09/2025 Not Detected  Not Detected Final    Influenza B Antigen ADAN 05/09/2025 Not Detected  Not Detected Final    Internal Control 05/09/2025 Passed  Passed Final    Lot Number 05/09/2025 [id number]   Final    Expiration Date 05/09/2025 02/07/2026   Final    Rapid Strep A Screen 05/09/2025 Negative  Negative, VALID, INVALID, Not Performed Final    Internal Control 05/09/2025 Passed  Passed Final    Lot Number 05/09/2025 [id number]   Final    Expiration Date 05/09/2025 04/24/2026   Final        IPSS Questionnaire (AUA-7):  Over the past month…    1)  How often have you had a sensation of not emptying your bladder completely after you finish urinating?  0 - Not at all   2)  How often have you had to urinate again less than two hours after you finished urinating? 3 - About half the time   3)  How often have you found you stopped and started again several times when you urinated?  3 - About half the time   4) How difficult have you found it to postpone urination?  5 - Almost always   5) How often have you had a weak urinary stream?  3 - About half the time   6) How often have you had to push or strain to begin urination?  2 - Less than half the time   7) How many times did you most typically get up to urinate from the time you went to bed until the time you got up in the morning?  2 - 2 times   Total Score:  18       Assessment and Plan    Assessment & Plan  BPH with lower urinary tract symptoms without urinary obstruction  Mr Koffi Melo is a 64 y.o. male who presents for evaluation for Erectile dysfunction, BPH and lower urinary tract symptoms.He denies any groin trauma,   His urine dipstick today is completely negative for any infection, it is negative for gross/microscopic hematuria. His IPSS score is 18, his PVR>0cc. PSA is 1.6    EDUCATION:BPH: FIRST, WE Discussed the pathophysiology of BPH and obstruction. We discussed the static and  dynamic effects of BPH as well as using 5 alpha reductase inhibitors versus alpha blockade.  We discussed the indications for transurethral surgery as well.  And/ or other therapeutic options available including all of the newer techniques.  He has no real symptomatology referable to his prostate other than moderate enlargement.  Rather than proceed with an expensive workup he doesn't need, at this time I am recommending an alpha blocker tamsulosin 0.4 mg capsule daily, VS CIALIS and alpha reductase inhibitor-finasteride 5 mg daily If indicated later.WE Discussed maximal medical therapy with finasteride and tamsulosin and how each medication works I explained that finasteride would reduce the size of the prostate by 20-30% reduce his PSA by 50% reduce the risks of either surgery or urinary retention by 50% and may reduce the risk of prostate cancer well-differentiated by 20-30% however he may increase the risk of poorly differentiated prostate cancer by half to 1% and that I also explained how on Flomax relaxes the prostate but can often cause retrograde ejaculation .    Erectile Dysfunction: Next, We discussed the anatomy and physiology of the penis and the endothelium.  We discussed the various forms of erectile dysfunction including peripheral vascular occlusive disease, Postoperative, secondary to radiation treatments of the prostate, and arterial inflow. We discussed the various treatment options available including oral medication and its various forms.  We discussed the use of both generic and non-generic Viagra.  We discussed Cialis and a longer half-life of 17 hours as well as the other 2 medications.  We discussed cost involved with this including the fact that the generic is much cheaper but is taken has multiple pills because they are 20 mg dosages.  We did discuss the other alternatives including Penile injections, vacuum erection devices and surgical intervention reserved for only the most severe  cases.  We discussed the need for testosterone in about 20% of cases of erectile dysfunction.        Plan   We Checked his Labs: Testosterone,Estradial, PSA, CBC    Will start Tamsulosin 0.4 mg daily    Encouraged to start Cialis 20 mg PRN as Prescribed    Follow up in Clinic in 4 Weeks for Re-evaluation    May return sooner if symptoms worsen    Patient is Agreeable with Plan of care    Orders:    tamsulosin (FLOMAX) 0.4 MG capsule 24 hr capsule; Take 1 capsule by mouth Daily for 90 days.    Testosterone    Estradiol    Comprehensive Metabolic Panel    PSA, Total & Free    Erectile dysfunction due to diseases classified elsewhere    Orders:    tamsulosin (FLOMAX) 0.4 MG capsule 24 hr capsule; Take 1 capsule by mouth Daily for 90 days.    Testosterone    Estradiol    Comprehensive Metabolic Panel    PSA, Total & Free      Patient reports that he is not currently experiencing any symptoms of urinary incontinence.    Smoking Cessation Counseling:  Never a smoker.  Patient does not currently use any tobacco products.       Follow Up   Return in about 1 month (around 7/20/2025) for Next scheduled follow up, FOR  BPH W LUTS/ED/PSA/MED REFILLS/LABS REVIEW/EVAL MEDS.    Patient was given instructions and counseling regarding his condition or for health maintenance advice. Please see specific information pulled into the AVS if appropriate.          This document has been electronically signed by Griselda Cheng-Akwa, APRN   June 27, 2025 20:22 EDT

## 2025-06-21 LAB
PSA FREE MFR SERPL: 16.1 %
PSA FREE SERPL-MCNC: 0.58 NG/ML
PSA SERPL-MCNC: 3.6 NG/ML (ref 0–4)

## 2025-07-01 NOTE — TELEPHONE ENCOUNTER
----- Message from Nancy Carey sent at 2/15/2017 10:24 AM EST -----  [phone]  PT SAW ASP YESTERDAY AND WAS TO HAVE COLE CALLED IN TO [facility] AND IT WAS SENT TO [facility]. CAN PT GET 10 DAY SUPPLY OF BOTH SENT TO [facility]    Let patient know that I am going to send a nasal spray for her to help relieve that fluid sensation in her ear.  She can just use 1 spray in each nostril daily.  We will see what her dentist says when she goes on Thursday.

## 2025-07-17 ENCOUNTER — TELEPHONE (OUTPATIENT)
Dept: INTERNAL MEDICINE | Facility: CLINIC | Age: 65
End: 2025-07-17
Payer: COMMERCIAL

## 2025-07-17 NOTE — TELEPHONE ENCOUNTER
"    Caller: Koffi Melo \"Yovani\"    Relationship: Self    Best call back number: [phone]     What medication are you requesting: MEDICATION FOR SLEEP      Have you had these symptoms before:    [x] Yes  [] No    Have you been treated for these symptoms before:   [x] Yes  [] No    If a prescription is needed, what is your preferred pharmacy and phone number: [id number] - [place], [place] - [address] AT [facility] [address] [facility] [phone] [facility] [phone]      Additional notes:PATIENT COULD NOT REMEMBER THE NAME OF IT        "

## 2025-07-17 NOTE — TELEPHONE ENCOUNTER
"Left message voicemail for patient that we were returning his call and to please call back.  Ofc. # given.     RELAY:  Dr Salazar said \"Thanks for your message about trouble sleeping. I’d recommend starting with an over-the-counter supplement called magnesium glycinate. This form of magnesium is often well-tolerated and may help improve sleep quality, particularly if your body is a bit low on magnesium.    You can start with 200 to 400 mg at bedtime. It's available without a prescription at most pharmacies, grocery stores, or online.    Let’s give this a try first and see how you respond. If it’s not helpful or if your symptoms persist, let me know and we can discuss prescription options tailored to your needs.\"    Document if notified.   "

## 2025-07-17 NOTE — TELEPHONE ENCOUNTER
Notify patient:    Thanks for your message about trouble sleeping. I’d recommend starting with an over-the-counter supplement called magnesium glycinate. This form of magnesium is often well-tolerated and may help improve sleep quality, particularly if your body is a bit low on magnesium.    You can start with 200 to 400 mg at bedtime. It's available without a prescription at most pharmacies, grocery stores, or online.    Let’s give this a try first and see how you respond. If it’s not helpful or if your symptoms persist, let me know and we can discuss prescription options tailored to your needs.    Daniel Salazar MD

## 2025-07-18 NOTE — TELEPHONE ENCOUNTER
Patient called and was read the message from Dr. Salazar. He requested that I call back and leave this message on his voice mail and I did this.   Phone:

## 2025-07-18 NOTE — TELEPHONE ENCOUNTER
"Attempt #2  Left message voicemail for patient that we were returning his call and to please call back.  Ofc. # given.      RELAY:  Dr Salazar said \"Thanks for your message about trouble sleeping. I’d recommend starting with an over-the-counter supplement called magnesium glycinate. This form of magnesium is often well-tolerated and may help improve sleep quality, particularly if your body is a bit low on magnesium.    You can start with 200 to 400 mg at bedtime. It's available without a prescription at most pharmacies, grocery stores, or online.    Let’s give this a try first and see how you respond. If it’s not helpful or if your symptoms persist, let me know and we can discuss prescription options tailored to your needs.\"     Document if notified.   "

## 2025-07-21 ENCOUNTER — OFFICE VISIT (OUTPATIENT)
Dept: UROLOGY | Facility: CLINIC | Age: 65
End: 2025-07-21
Payer: COMMERCIAL

## 2025-07-21 VITALS
DIASTOLIC BLOOD PRESSURE: 78 MMHG | HEIGHT: 68 IN | BODY MASS INDEX: 31.83 KG/M2 | HEART RATE: 73 BPM | OXYGEN SATURATION: 96 % | SYSTOLIC BLOOD PRESSURE: 118 MMHG | WEIGHT: 210 LBS

## 2025-07-21 DIAGNOSIS — N40.1 BPH WITH LOWER URINARY TRACT SYMPTOMS WITHOUT URINARY OBSTRUCTION: Primary | ICD-10-CM

## 2025-07-21 DIAGNOSIS — N52.1 ERECTILE DYSFUNCTION DUE TO DISEASES CLASSIFIED ELSEWHERE: ICD-10-CM

## 2025-07-21 PROCEDURE — 99214 OFFICE O/P EST MOD 30 MIN: CPT | Performed by: NURSE PRACTITIONER

## 2025-07-21 NOTE — PROGRESS NOTES
"Chief Complaint  Benign Prostatic Hypertrophy (4 WEEK FOLLOW UP BPH W.LUTS/ED) and Erectile Dysfunction    Subjective          History of Present Illness:  Koffi Melo is a 64 y.o. male who presents for RE- evaluation for Erectile dysfunction, BPH and lower urinary tract symptoms. This is a 4 week follow up. Patient was started on Flomax daily and Cialis 20 mg for ED and returns to report a remarkable improvement in his prior LUTs and ED symptoms.He endorses a headache and muscle pain post Cialis 20mg for which he took motrin.    His is most recent PSA on 06/20/25 is 3.6 with a free PSA of 16.1%, his PSA was  1.6 on 09/11/2024, His PSA prior was 2.030  two years prior.Estradiol is 32.7, with a Testosterone of  505. His urine dipstick today is completely negative for any infection, it is negative for gross/microscopic hematuria. His IPSS score is now 4, was 18, prior his PVR>0cc, with a quality of life score of 1-PLEASED       Objective   Vital Signs:   /78   Pulse 73   Ht 171.5 cm (67.5\")   Wt 95.3 kg (210 lb)   SpO2 96%   BMI 32.41 kg/m²       ROS:   Review of Systems     Physical Exam     Result Review :      Psa, cmp, testosterone, estradiol    RADIOLOGY (CT AND/OR KUB):    CT Abdomen and Pelvis: Results for orders placed during the hospital encounter of 09/15/17    CT Abdomen Pelvis With & Without Contrast    Narrative  EXAMINATION: CT ABDOMEN PELVIS W WO CONTRAST- 09/15/2017    INDICATION: K57.32-Diverticulitis of large intestine without perforation  or abscess without bleeding    TECHNIQUE: CT scan of the abdomen and pelvis was performed with and  without intravenous contrast.    The radiation dose reduction device was turned on for each scan per the  ALARA (As Low as Reasonably Achievable) protocol.    COMPARISON: 04/10/2017    FINDINGS: The most superior images demonstrate no basilar pulmonary  inflammatory process or pleural effusion. The liver and spleen are  normal. There is no " adrenal or pancreatic mass. There are bilateral  parapelvic renal cysts. There is no renal mass, stone or obstruction.  There is no ascites, aneurysm or retroperitoneal lymphadenopathy. There  are sigmoid diverticuli without diverticulitis. There is no perforation  or abscess. There is no pelvic or inguinal lymphadenopathy. The appendix  is normal.    Impression  Sigmoid diverticulosis without diverticulitis, perforation  or abscess.    D:  09/15/2017  E:  09/15/2017      This report was finalized on 9/15/2017 3:16 PM by Dr. Ismael Rhodes MD.     CT Stone Protocol: Results for orders placed during the hospital encounter of 03/10/17    CT Abdomen Pelvis Stone Protocol    Narrative  EXAMINATION: CT ABDOMEN PELVIS STONE PROTOCOL- 03/10/2017    INDICATION: N20.0-Calculus of kidney; R10.9-Unspecified abdominal pain ,  left flank pain for 5 days    TECHNIQUE:    CT data set of the abdomen and pelvis was performed without intravenous  contrast.    The radiation dose reduction device was turned on for each scan per the  ALARA (As Low as Reasonably Achievable) protocol.    COMPARISON: Compared to distant examinations, CT datasets of the abdomen  and pelvis, 07/21/2013.    FINDINGS:  1. There is a suggestion of bilateral hydronephrosis with  normal-appearing ureters which suggest ureteropelvic junction  obstructions. These may not be high-grade but may become symptomatic  with diuresis.    There is a small nonobstructing 2 mm stone in the lower pole of the  right kidney but there are no definite detectable stones in the left  kidney. Ureteral stones or ureteral obstruction is not identified and  the bladder is negative for abnormal calcifications.    Although parapelvic cysts can mask hydronephrosis and ureteropelvic  junction obstruction, patient appears to have dilated renal collecting  systems with pyelocaliectasis, worse on the left than right.  Contrast-enhanced datasets would better delineate the nature of  the  collecting systems. Also, for diagnostic testing, a radionuclide  examination of the kidneys with a Lasix challenge can grade the degree  and significance of ureteropelvic junction obstruction. Some patients  only have symptomatology during diuretic phases.    2. Renal parenchymal lesion is not identified and there is no evidence  of parenchymal atrophy of the kidneys.  3. The liver, spleen, pancreas, and adrenal glands are within normal  limits. There is no focal hepatic lesion and there are no abnormal  radiodensities seen within the gallbladder.  4. Retrocrural space, retroperitoneum, mid abdomen and lower abdomen are  unremarkable and the pelvis is negative. The patient has diverticulosis  without evidence of acute diverticulitis or inflammatory bowel disease.  Appendix is normal.    Impression  1. Evidence suggesting pyelocaliectasis of both renal collecting systems  with hydronephrotic distention of the renal collecting systems, slightly  worse on the left than right in the presence of normal size and normal  appearance to the ureters which suggests possible ureteropelvic junction  obstruction. The degree of obstruction is certainly not high-grade. The  patient has normal parenchymal complement and there is no evidence of  back pressure atrophy. On the other hand, however, compared to previous  studies of 2013, the dilated collecting systems appear to be slightly  more prominent bilaterally. The left renal collecting system is more  dilated than that seen on the right. Otherwise, obstructing stone is not  appreciated either at the ureteropelvic junction or in the ureters on  either side.    See above comment regarding further investigative study and diagnostic  testing.    2. No other acute focal abnormality is seen in the abdomen or pelvis.    D:  03/10/2017  E:  03/10/2017      This report was finalized on 3/10/2017 2:00 PM by Dr. Martinez Shaffer MD.     KUB: Results for orders placed during the hospital  encounter of 11/01/16    XR Abdomen KUB    Narrative  EXAMINATION: XR ABDOMEN KUB- 11/01/2016    INDICATION: R10.30-Lower abdominal pain, unspecified    COMPARISON: NONE    FINDINGS:  1. Heterotopic calcification is seen superior and leftward of the left  hip which could be traumatic or degenerative.  2. The bone structures of the pelvis and hips are otherwise intact.  There is mild narrowing of the medial joint compartments of the femoral  acetabular joints.  3. Retained feces is seen diffusely in the colon indicating  constipation, worse in the transverse and  ascending colon.  4. Otherwise, mass, transition zone or other acute abnormality is not  identified.    Impression  1. Marked retained feces in the colon.  2. Heterotopic calcification lateral to the left femoral acetabular  joint.  3. No other acute focal abnormality identified in the abdomen.    D:  11/01/2016  E:  11/01/2016    This report was finalized on 11/1/2016 4:38 PM by Dr. Martinez Shaffer MD.       LABS (3 MONTHS):    Office Visit on 06/20/2025   Component Date Value Ref Range Status    Testosterone, Total 06/20/2025 505.00  193.00 - 740.00 ng/dL Final    Estradiol 06/20/2025 32.7  pg/mL Final    Glucose 06/20/2025 95  65 - 99 mg/dL Final    BUN 06/20/2025 13.0  8.0 - 23.0 mg/dL Final    Creatinine 06/20/2025 1.40 (H)  0.76 - 1.27 mg/dL Final    Sodium 06/20/2025 140  136 - 145 mmol/L Final    Potassium 06/20/2025 4.5  3.5 - 5.2 mmol/L Final    Chloride 06/20/2025 104  98 - 107 mmol/L Final    CO2 06/20/2025 26.8  22.0 - 29.0 mmol/L Final    Calcium 06/20/2025 9.3  8.6 - 10.5 mg/dL Final    Total Protein 06/20/2025 6.9  6.0 - 8.5 g/dL Final    Albumin 06/20/2025 4.3  3.5 - 5.2 g/dL Final    ALT (SGPT) 06/20/2025 18  1 - 41 U/L Final    AST (SGOT) 06/20/2025 24  1 - 40 U/L Final    Alkaline Phosphatase 06/20/2025 79  39 - 117 U/L Final    Total Bilirubin 06/20/2025 0.3  0.0 - 1.2 mg/dL Final    Globulin 06/20/2025 2.6  gm/dL Final    A/G Ratio  06/20/2025 1.7  g/dL Final    BUN/Creatinine Ratio 06/20/2025 9.3  7.0 - 25.0 Final    Anion Gap 06/20/2025 9.2  5.0 - 15.0 mmol/L Final    eGFR 06/20/2025 56.8 (L)  >60.0 mL/min/1.73 Final    PSA 06/20/2025 3.6  0.0 - 4.0 ng/mL Final    Roche ECLIA methodology.  According to the American U[employer] Association, Serum PSA should  decrease and remain at undetectable levels after radical  prostatectomy. The AUA defines biochemical recurrence as an initial  PSA value 0.2 ng/mL or greater followed by a subsequent confirmatory  PSA value 0.2 ng/mL or greater.  Values obtained with different assay methods or kits cannot be used  interchangeably. Results cannot be interpreted as absolute evidence  of the presence or absence of malignant disease.    PSA, Free 06/20/2025 0.58  N/A ng/mL Final    Roche ECLIA methodology.    PSA, Free % 06/20/2025 16.1  % Final    The table below lists the probability of prostate cancer for  men with non-suspicious JULIANA results and total PSA between  4 and 10 ng/mL, by patient age (Nolvia et al, AMAIRANI 1998,  279:1542).                    % Free PSA       50-64 yr        65-75 yr                    0.00-10.00%        56%             55%                   10.01-15.00%        24%             35%                   15.01-20.00%        17%             23%                   20.01-25.00%        10%             20%                        >25.00%         5%              9%  Please note:  Nolvia et al did not make specific                recommendations regarding the use of                percent free PSA for any other population                of men.   Office Visit on 05/30/2025   Component Date Value Ref Range Status    Color 05/30/2025 Yellow  Yellow, Straw, Dark Yellow, Diana Final    Clarity, UA 05/30/2025 Clear  Clear Final    Specific Gravity  05/30/2025 1.020  1.005 - 1.030 Final    pH, Urine 05/30/2025 5.0  5.0 - 8.0 Final    Leukocytes 05/30/2025 Negative  Negative Final    Nitrite, UA  05/30/2025 Negative  Negative Final    Protein, POC 05/30/2025 Trace (A)  Negative mg/dL Final    Glucose, UA 05/30/2025 Negative  Negative mg/dL Final    Ketones, UA 05/30/2025 Negative  Negative Final    Urobilinogen, UA 05/30/2025 Normal  Normal, 0.2 E.U./dL Final    Bilirubin 05/30/2025 Negative  Negative Final    Blood, UA 05/30/2025 Negative  Negative Final    Lot Number 05/30/2025 [id number]   Final    Expiration Date 05/30/2025 01/31/2026   Final   Office Visit on 05/09/2025   Component Date Value Ref Range Status    SARS Antigen 05/09/2025 Detected (A)  Not Detected, Presumptive Negative Final    Influenza A Antigen ADAN 05/09/2025 Not Detected  Not Detected Final    Influenza B Antigen ADAN 05/09/2025 Not Detected  Not Detected Final    Internal Control 05/09/2025 Passed  Passed Final    Lot Number 05/09/2025 [id number]   Final    Expiration Date 05/09/2025 02/07/2026   Final    Rapid Strep A Screen 05/09/2025 Negative  Negative, VALID, INVALID, Not Performed Final    Internal Control 05/09/2025 Passed  Passed Final    Lot Number 05/09/2025 [id number]   Final    Expiration Date 05/09/2025 04/24/2026   Final          IPSS Questionnaire (AUA-7):  Over the past month…    1)  Incomplete Emptying:       How often have you had a sensation of not emptying you had the sensation of not emptying your bladder completely after you finished urinating?  0 - Not at all   2)  Frequency:       How often have you had the urinate again less than two hours after you finished urinating?  2 - Less than half the time   3)  Intermittency:       How often have you found you stopped and started again several times when you urinated?   0 - Not at all   4) Urgency:      How often have you found it difficult to postpone urination?  1 - Less than 1 time in 5   5) Weak Stream:      How often have you had a weak urinary stream?  0 - Not at all   6) Straining:       How often have you had to push or strain to begin urination?  0 - Not at all    7) Nocturia:      How many times did you most typically get up to urinate from the time you went to bed at night until the time you got up in the morning?  1 - 1 time   Total Score:  4   The International Prostate Symptom Score (IPSS) is used to screen, diagnose, track symptoms of benign prostatic hyperplasia (BPH).   0-7 (Mild Symptoms) 8-19 (Moderate) 20-35 (Severe)   Quality of Life (QoL):  If you were to spend the rest of your life with your urinary condition just the way it is now, how would you feel about that? 1-Pleased   Urine Leakage (Incontinence) 0-No Leakage        Assessment and Plan    Assessment & Plan  BPH with lower urinary tract symptoms without urinary obstruction  Mr Koffi Melo is a 64 y.o. male who presents for RE- evaluation for Erectile dysfunction, BPH and lower urinary tract symptoms. This is a 4 week follow up.     He is in no apparent distress today and  report a remarkable improvement in his prior LUTs and ED symptoms since starting therapy with Flomax daily and Cialis. He endorses a headache and muscle pain post Cialis 20mg for which he took motrin. His urine dipstick today is completely negative for any infection, it is negative for gross/microscopic hematuria. His IPSS score is now 4, was 18, prior his PVR>0cc, with a quality of life score of 1-PLEASED       PLAN  He will continue tamsulosin 0.4 mg daily.    Patient advised to decrease Cialis dose to 5 mg as needed    Discussed things he can do to help his urine frequency/incontinence episodes such as his weight control, keeping a bladder diary with strict intake and output of what he eats or drinks, how often she urinates, how much she urinates.  He has been encouraged to avoid bladder irritants such as caffeine products, coffee, tea, citrus/spicy foods.    He has also been encouraged to follow a timed voiding bladder training program, such as limiting the amount of time between bathroom breaks, using the bathroom at  regular intervals such as every 2-3 hours to prevent overflow incontinence.  Encouraged to try using techniques to suppress bladder urges, such as focusing on guided imagery, relaxation techniques on comfort measures.     Also discussed pelvic floor muscle exercises, and do Keagle exercises to strengthen the muscles to help control urination.    Patient  is agreeable with plan of care.   Erectile dysfunction due to diseases classified elsewhere  Continue Cialis 5 mg as discussed,not 20 mg    Patient reports that he is not currently experiencing any symptoms of urinary incontinence.    EDUCATION:      BPH: WE Discussed the pathophysiology of BPH and obstruction. We discussed the static and dynamic effects of BPH as well as using 5 alpha reductase inhibitors versus alpha blockade.  We discussed the indications for transurethral surgery as well.  And/ or other therapeutic options available including all of the newer techniques.  He has no real symptomatology referable to his prostate other than moderate enlargement.  Rather than proceed with an expensive workup he doesn't need, at this time I am recommending he continue an alpha blocker tamsulosin 0.4 mg capsule daily. WE Discussed maximal medical therapy with finasteride and tamsulosin and how each medication works I explained that finasteride would reduce the size of the prostate by 20-30% reduce his PSA by 50% reduce the risks of either surgery or urinary retention by 50% and may reduce the risk of prostate cancer well-differentiated by 20-30% However it may increase the risk of poorly differentiated prostate cancer by half to 1% and that I also explained how on Flomax relaxes the prostate he can often cause retrograde ejaculation and I recommended that he start both these medications and return to see us in 6 months with a PSA prior.     The patient desires Cialis 5 mg daily to treat his BPH/Erectile dysfunction. History and physical exam has not disclosed any  obvious treatable cause of this complaint. He is informed that Cialis is sometimes not covered by insurance. It is available on a fee-for-service cost basis, and is relatively expensive. He can start with 5 mg dose daily, and increase to 20 mg if necessary. He should not use any more than one tablet in a 24 hour period. The side effects of possible headache, flushing, dyspepsia and transient changes in vision have been explained.     The patient is not taking nitrates, and denies he has access to nitrates in any form at any time. I have counseled him that taking Cialis, or Viagra with nitrates of any form can cause death. We have also discussed the fact that there have been some deaths in patients after taking larger doses of Cialis/Viagra, felt due to the exertion of intercourse rather than the drug itself. The patient is aware of this, and accepts whatever unknown degree of risk there is in this aspect.     Smoking Cessation Counseling:  Never a smoker.  Patient does not currently use any tobacco products.     Follow Up   Return in about 24 weeks (around 1/5/2026) for Next scheduled follow up, FOR ED/ BPH W LUTS/PSA/-PROSTATE JULIANA/MED REFILLS/LABS.    Patient was given instructions and counseling regarding his condition or for health maintenance advice. Please see specific information pulled into the AVS if appropriate.          This document has been electronically signed by Griselda Cheng-Akwa, APRN   July 27, 2025 10:09 EDT

## 2025-07-21 NOTE — TELEPHONE ENCOUNTER
"  Name: Koffi Melo \"Yovani\"    Relationship: Self    Best Callback Number: [phone]    HUB PROVIDED THE RELAY MESSAGE FROM THE OFFICE   PATIENT VOICED UNDERSTANDING AND HAS NO FURTHER QUESTIONS AT THIS TIME    ADDITIONAL INFORMATION:   "

## 2025-07-22 DIAGNOSIS — F41.9 ANXIETY AND DEPRESSION: ICD-10-CM

## 2025-07-22 DIAGNOSIS — F32.A ANXIETY AND DEPRESSION: ICD-10-CM

## 2025-07-23 RX ORDER — BUPROPION HYDROCHLORIDE 150 MG/1
TABLET ORAL
Qty: 90 TABLET | Refills: 1 | Status: SHIPPED | OUTPATIENT
Start: 2025-07-23

## 2025-07-24 ENCOUNTER — TELEPHONE (OUTPATIENT)
Dept: INTERNAL MEDICINE | Facility: CLINIC | Age: 65
End: 2025-07-24

## 2025-07-24 RX ORDER — ZOLPIDEM TARTRATE 10 MG/1
10 TABLET ORAL NIGHTLY PRN
Qty: 30 TABLET | Refills: 1 | Status: SHIPPED | OUTPATIENT
Start: 2025-07-24

## 2025-07-27 PROBLEM — N52.1 ERECTILE DYSFUNCTION DUE TO DISEASES CLASSIFIED ELSEWHERE: Status: ACTIVE | Noted: 2025-07-27

## 2025-07-27 PROBLEM — N40.1 BPH WITH LOWER URINARY TRACT SYMPTOMS WITHOUT URINARY OBSTRUCTION: Status: ACTIVE | Noted: 2025-07-27

## 2025-07-27 NOTE — ASSESSMENT & PLAN NOTE
Mr Koffi Melo is a 64 y.o. male who presents for RE- evaluation for Erectile dysfunction, BPH and lower urinary tract symptoms. This is a 4 week follow up.     He is in no apparent distress today and  report a remarkable improvement in his prior LUTs and ED symptoms since starting therapy with Flomax daily and Cialis. He endorses a headache and muscle pain post Cialis 20mg for which he took motrin. His urine dipstick today is completely negative for any infection, it is negative for gross/microscopic hematuria. His IPSS score is now 4, was 18, prior his PVR>0cc, with a quality of life score of 1-PLEASED       PLAN  He will continue tamsulosin 0.4 mg daily.    Patient advised to decrease Cialis dose to 5 mg as needed    Discussed things he can do to help his urine frequency/incontinence episodes such as his weight control, keeping a bladder diary with strict intake and output of what he eats or drinks, how often she urinates, how much she urinates.  He has been encouraged to avoid bladder irritants such as caffeine products, coffee, tea, citrus/spicy foods.    He has also been encouraged to follow a timed voiding bladder training program, such as limiting the amount of time between bathroom breaks, using the bathroom at regular intervals such as every 2-3 hours to prevent overflow incontinence.  Encouraged to try using techniques to suppress bladder urges, such as focusing on guided imagery, relaxation techniques on comfort measures.     Also discussed pelvic floor muscle exercises, and do Keagle exercises to strengthen the muscles to help control urination.    Patient  is agreeable with plan of care.